# Patient Record
Sex: MALE | Race: WHITE | Employment: OTHER | URBAN - METROPOLITAN AREA
[De-identification: names, ages, dates, MRNs, and addresses within clinical notes are randomized per-mention and may not be internally consistent; named-entity substitution may affect disease eponyms.]

---

## 2017-01-03 ENCOUNTER — ED HISTORICAL/CONVERTED ENCOUNTER (OUTPATIENT)
Dept: OTHER | Age: 82
End: 2017-01-03

## 2017-05-07 ENCOUNTER — OP HISTORICAL/CONVERTED ENCOUNTER (OUTPATIENT)
Dept: OTHER | Age: 82
End: 2017-05-07

## 2017-07-19 ENCOUNTER — OP HISTORICAL/CONVERTED ENCOUNTER (OUTPATIENT)
Dept: OTHER | Age: 82
End: 2017-07-19

## 2017-07-20 ENCOUNTER — OP HISTORICAL/CONVERTED ENCOUNTER (OUTPATIENT)
Dept: OTHER | Age: 82
End: 2017-07-20

## 2017-09-12 ENCOUNTER — OP HISTORICAL/CONVERTED ENCOUNTER (OUTPATIENT)
Dept: OTHER | Age: 82
End: 2017-09-12

## 2017-09-18 ENCOUNTER — OP HISTORICAL/CONVERTED ENCOUNTER (OUTPATIENT)
Dept: OTHER | Age: 82
End: 2017-09-18

## 2017-09-25 ENCOUNTER — OP HISTORICAL/CONVERTED ENCOUNTER (OUTPATIENT)
Dept: OTHER | Age: 82
End: 2017-09-25

## 2017-10-13 ENCOUNTER — OP HISTORICAL/CONVERTED ENCOUNTER (OUTPATIENT)
Dept: OTHER | Age: 82
End: 2017-10-13

## 2018-03-07 ENCOUNTER — ED HISTORICAL/CONVERTED ENCOUNTER (OUTPATIENT)
Dept: OTHER | Age: 83
End: 2018-03-07

## 2018-07-05 ENCOUNTER — OP HISTORICAL/CONVERTED ENCOUNTER (OUTPATIENT)
Dept: OTHER | Age: 83
End: 2018-07-05

## 2018-10-09 ENCOUNTER — OP HISTORICAL/CONVERTED ENCOUNTER (OUTPATIENT)
Dept: OTHER | Age: 83
End: 2018-10-09

## 2018-11-26 ENCOUNTER — OP HISTORICAL/CONVERTED ENCOUNTER (OUTPATIENT)
Dept: OTHER | Age: 83
End: 2018-11-26

## 2019-08-28 ENCOUNTER — OP HISTORICAL/CONVERTED ENCOUNTER (OUTPATIENT)
Dept: OTHER | Age: 84
End: 2019-08-28

## 2021-02-03 ENCOUNTER — APPOINTMENT (OUTPATIENT)
Dept: CT IMAGING | Age: 86
DRG: 556 | End: 2021-02-03
Attending: PHYSICIAN ASSISTANT
Payer: MEDICARE

## 2021-02-03 ENCOUNTER — APPOINTMENT (OUTPATIENT)
Dept: GENERAL RADIOLOGY | Age: 86
DRG: 556 | End: 2021-02-03
Attending: EMERGENCY MEDICINE
Payer: MEDICARE

## 2021-02-03 ENCOUNTER — HOSPITAL ENCOUNTER (INPATIENT)
Age: 86
LOS: 4 days | Discharge: NURSING, REHAB OR LONG-TERM CARE | DRG: 556 | End: 2021-02-08
Attending: INTERNAL MEDICINE | Admitting: INTERNAL MEDICINE
Payer: MEDICARE

## 2021-02-03 DIAGNOSIS — R26.9 GAIT DISTURBANCE: Primary | ICD-10-CM

## 2021-02-03 DIAGNOSIS — M25.551 HIP JOINT PAINFUL ON MOVEMENT, RIGHT: ICD-10-CM
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
## 2021-02-03 DIAGNOSIS — F03.90 DEMENTIA WITHOUT BEHAVIORAL DISTURBANCE, UNSPECIFIED DEMENTIA TYPE: ICD-10-CM
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
## 2021-02-03 LAB
ALBUMIN SERPL-MCNC: 3 G/DL (ref 3.5–5)
ALBUMIN/GLOB SERPL: 0.7 {RATIO} (ref 1.1–2.2)
ALP SERPL-CCNC: 105 U/L (ref 45–117)
ALT SERPL-CCNC: 19 U/L (ref 12–78)
ANION GAP SERPL CALC-SCNC: 7 MMOL/L (ref 5–15)
APPEARANCE UR: CLEAR
AST SERPL W P-5'-P-CCNC: 28 U/L (ref 15–37)
BACTERIA URNS QL MICRO: NEGATIVE /HPF
BASOPHILS # BLD: 0 K/UL (ref 0–0.1)
BASOPHILS NFR BLD: 0 % (ref 0–1)
BILIRUB SERPL-MCNC: 0.7 MG/DL (ref 0.2–1)
BILIRUB UR QL: NEGATIVE
BUN SERPL-MCNC: 21 MG/DL (ref 6–20)
BUN/CREAT SERPL: 17 (ref 12–20)
CA-I BLD-MCNC: 8.8 MG/DL (ref 8.5–10.1)
CHLORIDE SERPL-SCNC: 104 MMOL/L (ref 97–108)
CO2 SERPL-SCNC: 25 MMOL/L (ref 21–32)
COLOR UR: ABNORMAL
CREAT SERPL-MCNC: 1.24 MG/DL (ref 0.7–1.3)
DIFFERENTIAL METHOD BLD: ABNORMAL
EOSINOPHIL # BLD: 0 K/UL (ref 0–0.4)
EOSINOPHIL NFR BLD: 0 % (ref 0–7)
ERYTHROCYTE [DISTWIDTH] IN BLOOD BY AUTOMATED COUNT: 14.9 % (ref 11.5–14.5)
GLOBULIN SER CALC-MCNC: 4.5 G/DL (ref 2–4)
GLUCOSE BLD STRIP.AUTO-MCNC: 159 MG/DL (ref 65–100)
GLUCOSE SERPL-MCNC: 119 MG/DL (ref 65–100)
GLUCOSE UR STRIP.AUTO-MCNC: NEGATIVE MG/DL
HCT VFR BLD AUTO: 29.7 % (ref 36.6–50.3)
HGB BLD-MCNC: 9.8 G/DL (ref 12.1–17)
HGB UR QL STRIP: ABNORMAL
IMM GRANULOCYTES # BLD AUTO: 0 K/UL (ref 0–0.04)
IMM GRANULOCYTES NFR BLD AUTO: 0 % (ref 0–0.5)
KETONES UR QL STRIP.AUTO: NEGATIVE MG/DL
LEUKOCYTE ESTERASE UR QL STRIP.AUTO: NEGATIVE
LYMPHOCYTES # BLD: 1.1 K/UL (ref 0.8–3.5)
LYMPHOCYTES NFR BLD: 20 % (ref 12–49)
MCH RBC QN AUTO: 42.8 PG (ref 26–34)
MCHC RBC AUTO-ENTMCNC: 33 G/DL (ref 30–36.5)
MCV RBC AUTO: 129.7 FL (ref 80–99)
MONOCYTES # BLD: 0.4 K/UL (ref 0–1)
MONOCYTES NFR BLD: 8 % (ref 5–13)
MUCOUS THREADS URNS QL MICRO: ABNORMAL /LPF
NEUTS SEG # BLD: 4.1 K/UL (ref 1.8–8)
NEUTS SEG NFR BLD: 72 % (ref 32–75)
NITRITE UR QL STRIP.AUTO: NEGATIVE
PERFORMED BY, TECHID: ABNORMAL
PH UR STRIP: 5 [PH] (ref 5–8)
PLATELET # BLD AUTO: 180 K/UL (ref 150–400)
PMV BLD AUTO: 10.2 FL (ref 8.9–12.9)
POTASSIUM SERPL-SCNC: 4.6 MMOL/L (ref 3.5–5.1)
PROT SERPL-MCNC: 7.5 G/DL (ref 6.4–8.2)
PROT UR STRIP-MCNC: NEGATIVE MG/DL
RBC # BLD AUTO: 2.29 M/UL (ref 4.1–5.7)
RBC #/AREA URNS HPF: ABNORMAL /HPF (ref 0–5)
SODIUM SERPL-SCNC: 136 MMOL/L (ref 136–145)
SP GR UR REFRACTOMETRY: 1.02 (ref 1–1.03)
TROPONIN I SERPL-MCNC: <0.05 NG/ML
UA: UC IF INDICATED,UAUC: ABNORMAL
UROBILINOGEN UR QL STRIP.AUTO: 0.1 EU/DL (ref 0.1–1)
WBC # BLD AUTO: 5.7 K/UL (ref 4.1–11.1)
WBC URNS QL MICRO: ABNORMAL /HPF (ref 0–4)

## 2021-02-03 PROCEDURE — 93005 ELECTROCARDIOGRAM TRACING: CPT

## 2021-02-03 PROCEDURE — 99218 HC RM OBSERVATION: CPT

## 2021-02-03 PROCEDURE — 71045 X-RAY EXAM CHEST 1 VIEW: CPT

## 2021-02-03 PROCEDURE — 72192 CT PELVIS W/O DYE: CPT

## 2021-02-03 PROCEDURE — 99285 EMERGENCY DEPT VISIT HI MDM: CPT

## 2021-02-03 PROCEDURE — 36415 COLL VENOUS BLD VENIPUNCTURE: CPT

## 2021-02-03 PROCEDURE — 96374 THER/PROPH/DIAG INJ IV PUSH: CPT

## 2021-02-03 PROCEDURE — 81001 URINALYSIS AUTO W/SCOPE: CPT

## 2021-02-03 PROCEDURE — 72131 CT LUMBAR SPINE W/O DYE: CPT

## 2021-02-03 PROCEDURE — 80053 COMPREHEN METABOLIC PANEL: CPT

## 2021-02-03 PROCEDURE — 73502 X-RAY EXAM HIP UNI 2-3 VIEWS: CPT

## 2021-02-03 PROCEDURE — 74011250637 HC RX REV CODE- 250/637: Performed by: PHYSICIAN ASSISTANT

## 2021-02-03 PROCEDURE — 82962 GLUCOSE BLOOD TEST: CPT

## 2021-02-03 PROCEDURE — 70450 CT HEAD/BRAIN W/O DYE: CPT

## 2021-02-03 PROCEDURE — 85025 COMPLETE CBC W/AUTO DIFF WBC: CPT

## 2021-02-03 PROCEDURE — 84484 ASSAY OF TROPONIN QUANT: CPT

## 2021-02-03 PROCEDURE — 74011250636 HC RX REV CODE- 250/636: Performed by: PHYSICIAN ASSISTANT

## 2021-02-03 RX ORDER — LEVOTHYROXINE SODIUM 50 UG/1
50 TABLET ORAL
COMMUNITY
End: 2021-03-03

## 2021-02-03 RX ORDER — METFORMIN HYDROCHLORIDE 500 MG/1
500 TABLET ORAL 2 TIMES DAILY
COMMUNITY
End: 2021-03-03

## 2021-02-03 RX ORDER — TAMSULOSIN HYDROCHLORIDE 0.4 MG/1
0.4 CAPSULE ORAL DAILY
COMMUNITY
End: 2021-03-03

## 2021-02-03 RX ORDER — LEVOTHYROXINE SODIUM 25 UG/1
50 TABLET ORAL
Status: DISCONTINUED | OUTPATIENT
Start: 2021-02-04 | End: 2021-02-08 | Stop reason: HOSPADM

## 2021-02-03 RX ORDER — TOLTERODINE 4 MG/1
4 CAPSULE, EXTENDED RELEASE ORAL DAILY
COMMUNITY
End: 2021-03-03

## 2021-02-03 RX ORDER — MAGNESIUM SULFATE 100 %
4 CRYSTALS MISCELLANEOUS AS NEEDED
Status: DISCONTINUED | OUTPATIENT
Start: 2021-02-03 | End: 2021-02-08 | Stop reason: HOSPADM

## 2021-02-03 RX ORDER — TAMSULOSIN HYDROCHLORIDE 0.4 MG/1
0.4 CAPSULE ORAL DAILY
Status: DISCONTINUED | OUTPATIENT
Start: 2021-02-04 | End: 2021-02-08 | Stop reason: HOSPADM

## 2021-02-03 RX ORDER — OXYCODONE HYDROCHLORIDE 5 MG/1
5 TABLET ORAL
Status: DISCONTINUED | OUTPATIENT
Start: 2021-02-03 | End: 2021-02-08 | Stop reason: HOSPADM

## 2021-02-03 RX ORDER — DILTIAZEM HYDROCHLORIDE 240 MG/1
240 CAPSULE, EXTENDED RELEASE ORAL DAILY
Status: DISCONTINUED | OUTPATIENT
Start: 2021-02-04 | End: 2021-02-04

## 2021-02-03 RX ORDER — MORPHINE SULFATE 2 MG/ML
2 INJECTION, SOLUTION INTRAMUSCULAR; INTRAVENOUS ONCE
Status: COMPLETED | OUTPATIENT
Start: 2021-02-03 | End: 2021-02-03

## 2021-02-03 RX ORDER — DEXTROSE 50 % IN WATER (D50W) INTRAVENOUS SYRINGE
25-50 AS NEEDED
Status: DISCONTINUED | OUTPATIENT
Start: 2021-02-03 | End: 2021-02-08 | Stop reason: HOSPADM

## 2021-02-03 RX ORDER — TRAMADOL HYDROCHLORIDE 50 MG/1
50 TABLET ORAL
Status: DISCONTINUED | OUTPATIENT
Start: 2021-02-03 | End: 2021-02-08 | Stop reason: HOSPADM

## 2021-02-03 RX ORDER — ACETAMINOPHEN 650 MG/1
650 SUPPOSITORY RECTAL
Status: DISCONTINUED | OUTPATIENT
Start: 2021-02-03 | End: 2021-02-08 | Stop reason: HOSPADM

## 2021-02-03 RX ORDER — HYDROXYUREA 500 MG/1
500 CAPSULE ORAL 2 TIMES DAILY
COMMUNITY
End: 2021-03-03

## 2021-02-03 RX ORDER — ONDANSETRON 2 MG/ML
4 INJECTION INTRAMUSCULAR; INTRAVENOUS
Status: DISCONTINUED | OUTPATIENT
Start: 2021-02-03 | End: 2021-02-08 | Stop reason: HOSPADM

## 2021-02-03 RX ORDER — POLYETHYLENE GLYCOL 3350 17 G/17G
17 POWDER, FOR SOLUTION ORAL DAILY PRN
Status: DISCONTINUED | OUTPATIENT
Start: 2021-02-03 | End: 2021-02-08 | Stop reason: HOSPADM

## 2021-02-03 RX ORDER — PROMETHAZINE HYDROCHLORIDE 25 MG/1
12.5 TABLET ORAL
Status: DISCONTINUED | OUTPATIENT
Start: 2021-02-03 | End: 2021-02-08 | Stop reason: HOSPADM

## 2021-02-03 RX ORDER — SODIUM CHLORIDE 0.9 % (FLUSH) 0.9 %
5-40 SYRINGE (ML) INJECTION EVERY 8 HOURS
Status: DISCONTINUED | OUTPATIENT
Start: 2021-02-03 | End: 2021-02-06

## 2021-02-03 RX ORDER — HYDROXYUREA 500 MG/1
500 CAPSULE ORAL 2 TIMES DAILY
Status: DISCONTINUED | OUTPATIENT
Start: 2021-02-03 | End: 2021-02-08 | Stop reason: HOSPADM

## 2021-02-03 RX ORDER — DABIGATRAN ETEXILATE 75 MG/1
75 CAPSULE ORAL EVERY 12 HOURS
COMMUNITY
End: 2021-03-03

## 2021-02-03 RX ORDER — GLYBURIDE 5 MG/1
5 TABLET ORAL
COMMUNITY
End: 2021-03-03

## 2021-02-03 RX ORDER — SODIUM CHLORIDE 0.9 % (FLUSH) 0.9 %
5-40 SYRINGE (ML) INJECTION AS NEEDED
Status: DISCONTINUED | OUTPATIENT
Start: 2021-02-03 | End: 2021-02-08 | Stop reason: HOSPADM

## 2021-02-03 RX ORDER — INSULIN LISPRO 100 [IU]/ML
INJECTION, SOLUTION INTRAVENOUS; SUBCUTANEOUS
Status: DISCONTINUED | OUTPATIENT
Start: 2021-02-03 | End: 2021-02-08 | Stop reason: HOSPADM

## 2021-02-03 RX ORDER — DILTIAZEM HYDROCHLORIDE 240 MG/1
240 CAPSULE, EXTENDED RELEASE ORAL DAILY
COMMUNITY
End: 2021-03-03

## 2021-02-03 RX ORDER — ACETAMINOPHEN 325 MG/1
650 TABLET ORAL
Status: DISCONTINUED | OUTPATIENT
Start: 2021-02-03 | End: 2021-02-08 | Stop reason: HOSPADM

## 2021-02-03 RX ORDER — OXYBUTYNIN CHLORIDE 5 MG/1
5 TABLET ORAL 3 TIMES DAILY
Status: DISCONTINUED | OUTPATIENT
Start: 2021-02-03 | End: 2021-02-08 | Stop reason: HOSPADM

## 2021-02-03 RX ADMIN — MORPHINE SULFATE 2 MG: 2 INJECTION, SOLUTION INTRAMUSCULAR; INTRAVENOUS at 14:09

## 2021-02-03 RX ADMIN — OXYCODONE 5 MG: 5 TABLET ORAL at 23:54

## 2021-02-03 RX ADMIN — Medication 10 ML: at 23:54

## 2021-02-03 NOTE — ED PROVIDER NOTES
EMERGENCY DEPARTMENT HISTORY AND PHYSICAL EXAM      Date: 2/3/2021  Patient Name: Louise Parker    History of Presenting Illness     Chief Complaint   Patient presents with    Extremity Weakness       History Provided By: Patient, EMS and Caregiver    HPI: Louise Parker, 80 y.o. male with a past medical history significant dementia, DM, HTN, TIA, BPH presents to the ED with cc of right hip pain status post fall 2 nights ago. Patient ports he is unable to bear any weight on his right leg or bend the right knee secondary to the pain. He reports ambulating with a walker at home however 2 nights ago he lost his balance and fell backwards directly onto his buttocks. Patient reports laying in the bed most of the last 2 days. Wife at home to help care for him. He specifically denies previous back surgery, numbness, tingling, chest pain, shortness of breath, dizziness, head injury, headache, loss of consciousness, focal weakness, slurred speech, blurred vision, fever, bowel incontinence, urinary retention. There are no other complaints, changes, or physical findings at this time.     PCP: Tracy Mae MD        Past History     Past Medical History:  Past Medical History:   Diagnosis Date    [facility]     Diabetes mellitus type 2, uncomplicated ([facility] [facility]     Hyperlipidemia     Hypertension     Hypothyroidism     Polycythemia vera ([facility] [address] [facility]     Prostatic hyperplasia     Spinal stenosis     Transient ischemic attack        Past Surgical History:  Past Surgical History:   Procedure Laterality Date    HX OTHER SURGICAL      None       Family History:  Family History   Problem Relation Age of Onset    Hypertension Mother     Stroke Mother     Lung Cancer Father     Melanoma Father        Social History:  Social History     Tobacco Use    Smoking status: Not on file   Substance Use Topics    Alcohol use: Never     Frequency: Never     Binge frequency: Never    Drug use: Never Allergies:  No Known Allergies      Review of Systems   Review of Systems   Constitutional: Negative for activity change, chills and fever. HENT: Negative for congestion, ear pain, rhinorrhea and trouble swallowing. Eyes: Negative for pain and visual disturbance. Respiratory: Negative for cough and shortness of breath. Cardiovascular: Negative for chest pain. Gastrointestinal: Negative for abdominal pain, diarrhea, nausea and vomiting. Genitourinary: Negative for decreased urine volume, difficulty urinating, dysuria and hematuria. Musculoskeletal: Positive for arthralgias, gait problem and myalgias. Negative for joint swelling. Skin: Negative for rash. Neurological: Negative for dizziness, speech difficulty, weakness, numbness and headaches. Hematological: Negative for adenopathy. Psychiatric/Behavioral: The patient is not nervous/anxious. All other systems reviewed and are negative. Physical Exam   Physical Exam  Vitals signs and nursing note reviewed. Constitutional:       General: He is not in acute distress. Appearance: He is well-developed. Comments: Laying on stretcher with left knee bent, right leg straight   HENT:      Head: Normocephalic and atraumatic. Mouth/Throat:      Mouth: Mucous membranes are moist.   Eyes:      Extraocular Movements: Extraocular movements intact. Pupils: Pupils are equal, round, and reactive to light. Cardiovascular:      Rate and Rhythm: Normal rate and regular rhythm. Heart sounds: Normal heart sounds. Pulmonary:      Effort: Pulmonary effort is normal. No respiratory distress. Breath sounds: Normal breath sounds. Abdominal:      General: Abdomen is flat. Bowel sounds are normal.      Palpations: Abdomen is soft. Tenderness: There is no abdominal tenderness. Musculoskeletal:      Right hip: He exhibits decreased range of motion, decreased strength, tenderness and bony tenderness.  He exhibits no deformity. Lumbar back: He exhibits no bony tenderness, no edema and no deformity. Comments: No leg length discrepancy, distal pulses intact, dorsi/plantar flexion intact, patient reports pain with right hip flexion, ER, and right knee flexion. No obvious ecchymosis    Skin:     General: Skin is warm and dry. Capillary Refill: Capillary refill takes less than 2 seconds. Findings: No rash. Neurological:      General: No focal deficit present. Mental Status: He is alert and oriented to person, place, and time. Mental status is at baseline. Cranial Nerves: No cranial nerve deficit. Sensory: Sensation is intact. Psychiatric:         Mood and Affect: Mood normal.         Behavior: Behavior normal.         Diagnostic Study Results     Labs -     Recent Results (from the past 48 hour(s))   CBC WITH AUTOMATED DIFF    Collection Time: 02/03/21 10:50 AM   Result Value Ref Range    WBC 5.7 4.1 - 11.1 K/uL    RBC 2.29 (L) 4.10 - 5.70 M/uL    HGB 9.8 (L) 12.1 - 17.0 g/dL    HCT 29.7 (L) 36.6 - 50.3 %    .7 (H) 80.0 - 99.0 FL    MCH 42.8 (H) 26.0 - 34.0 PG    MCHC 33.0 30.0 - 36.5 g/dL    RDW 14.9 (H) 11.5 - 14.5 %    PLATELET 478 003 - 482 K/uL    MPV 10.2 8.9 - 12.9 FL    NEUTROPHILS 72 32 - 75 %    LYMPHOCYTES 20 12 - 49 %    MONOCYTES 8 5 - 13 %    EOSINOPHILS 0 0 - 7 %    BASOPHILS 0 0 - 1 %    IMMATURE GRANULOCYTES 0 0.0 - 0.5 %    ABS. NEUTROPHILS 4.1 1.8 - 8.0 K/UL    ABS. LYMPHOCYTES 1.1 0.8 - 3.5 K/UL    ABS. MONOCYTES 0.4 0.0 - 1.0 K/UL    ABS. EOSINOPHILS 0.0 0.0 - 0.4 K/UL    ABS. BASOPHILS 0.0 0.0 - 0.1 K/UL    ABS. IMM.  GRANS. 0.0 0.00 - 0.04 K/UL    DF AUTOMATED     METABOLIC PANEL, COMPREHENSIVE    Collection Time: 02/03/21 10:50 AM   Result Value Ref Range    Sodium 136 136 - 145 mmol/L    Potassium 4.6 3.5 - 5.1 mmol/L    Chloride 104 97 - 108 mmol/L    CO2 25 21 - 32 mmol/L    Anion gap 7 5 - 15 mmol/L    Glucose 119 (H) 65 - 100 mg/dL    BUN 21 (H) 6 - 20 mg/dL Creatinine 1.24 0.70 - 1.30 mg/dL    BUN/Creatinine ratio 17 12 - 20      GFR est AA >60 >60 ml/min/1.73m2    GFR est non-AA 55 (L) >60 ml/min/1.73m2    Calcium 8.8 8.5 - 10.1 mg/dL    Bilirubin, total 0.7 0.2 - 1.0 mg/dL    AST (SGOT) 28 15 - 37 U/L    ALT (SGPT) 19 12 - 78 U/L    Alk.  phosphatase 105 45 - 117 U/L    Protein, total 7.5 6.4 - 8.2 g/dL    Albumin 3.0 (L) 3.5 - 5.0 g/dL    Globulin 4.5 (H) 2.0 - 4.0 g/dL    A-G Ratio 0.7 (L) 1.1 - 2.2     TROPONIN I    Collection Time: 02/03/21 10:50 AM   Result Value Ref Range    Troponin-I, Qt. <0.05 <0.05 ng/mL   URINALYSIS W/ REFLEX CULTURE    Collection Time: 02/03/21 10:50 AM    Specimen: Urine   Result Value Ref Range    Color Yellow/Straw      Appearance Clear Clear      Specific gravity 1.016 1.003 - 1.030      pH (UA) 5.0 5.0 - 8.0      Protein Negative Negative mg/dL    Glucose Negative Negative mg/dL    Ketone Negative Negative mg/dL    Bilirubin Negative Negative      Blood Small (A) Negative      Urobilinogen 0.1 0.1 - 1.0 EU/dL    Nitrites Negative Negative      Leukocyte Esterase Negative Negative      UA:UC IF INDICATED Culture not indicated by UA result Culture not indicated by UA result      Mucus Trace /lpf    WBC 0-4 0 - 4 /hpf    RBC 0-5 0 - 5 /hpf    Bacteria Negative Negative /hpf   GLUCOSE, POC    Collection Time: 02/03/21 11:46 PM   Result Value Ref Range    Glucose (POC) 159 (H) 65 - 100 mg/dL    Performed by Vidhi Coto    GLUCOSE, POC    Collection Time: 02/04/21  8:47 AM   Result Value Ref Range    Glucose (POC) 118 (H) 65 - 100 mg/dL    Performed by [place]    CBC WITH AUTOMATED DIFF    Collection Time: 02/04/21  9:10 AM   Result Value Ref Range    WBC 6.3 4.1 - 11.1 K/uL    RBC 2.99 (L) 4.10 - 5.70 M/uL    HGB 13.2 12.1 - 17.0 g/dL    HCT 37.9 36.6 - 50.3 %    .8 (H) 80.0 - 99.0 FL    MCH 44.1 (H) 26.0 - 34.0 PG    MCHC 34.8 30.0 - 36.5 g/dL    RDW 14.6 (H) 11.5 - 14.5 %    PLATELET 664 322 - 757 K/uL    MPV 10.3 8.9 - 12.9 FL    NEUTROPHILS 76 (H) 32 - 75 %    LYMPHOCYTES 16 12 - 49 %    MONOCYTES 8 5 - 13 %    EOSINOPHILS 0 0 - 7 %    BASOPHILS 0 0 - 1 %    IMMATURE GRANULOCYTES 0 0.0 - 0.5 %    ABS. NEUTROPHILS 4.8 1.8 - 8.0 K/UL    ABS. LYMPHOCYTES 1.0 0.8 - 3.5 K/UL    ABS. MONOCYTES 0.5 0.0 - 1.0 K/UL    ABS. EOSINOPHILS 0.0 0.0 - 0.4 K/UL    ABS. BASOPHILS 0.0 0.0 - 0.1 K/UL    ABS. IMM. GRANS. 0.0 0.00 - 0.04 K/UL    DF AUTOMATED     METABOLIC PANEL, COMPREHENSIVE    Collection Time: 02/04/21  9:10 AM   Result Value Ref Range    Sodium 137 136 - 145 mmol/L    Potassium 3.9 3.5 - 5.1 mmol/L    Chloride 104 97 - 108 mmol/L    CO2 28 21 - 32 mmol/L    Anion gap 5 5 - 15 mmol/L    Glucose 122 (H) 65 - 100 mg/dL    BUN 18 6 - 20 mg/dL    Creatinine 1.05 0.70 - 1.30 mg/dL    BUN/Creatinine ratio 17 12 - 20      GFR est AA >60 >60 ml/min/1.73m2    GFR est non-AA >60 >60 ml/min/1.73m2    Calcium 9.0 8.5 - 10.1 mg/dL    Bilirubin, total 0.7 0.2 - 1.0 mg/dL    AST (SGOT) 6 (L) 15 - 37 U/L    ALT (SGPT) 14 12 - 78 U/L    Alk. phosphatase 97 45 - 117 U/L    Protein, total 6.9 6.4 - 8.2 g/dL    Albumin 2.9 (L) 3.5 - 5.0 g/dL    Globulin 4.0 2.0 - 4.0 g/dL    A-G Ratio 0.7 (L) 1.1 - 2.2     GLUCOSE, POC    Collection Time: 02/04/21 11:40 AM   Result Value Ref Range    Glucose (POC) 125 (H) 65 - 100 mg/dL    Performed by Evonne Kanner        Radiologic Studies -   XR Results (most recent):  Results from Hospital Encounter encounter on 02/03/21   XR HIP RT W OR WO PELV 2-3 VWS    Narrative Right hip, 2 views    No plain film evidence for fracture or dislocation. Moderate grade DJD each hip,  evident as joint space narrowing and articular sclerosis. CT Results  (Last 48 hours)               02/03/21 1519  CT PELV WO CONT Final result    Impression:  No pelvic fracture. No fracture or dislocation either hip. Abdominal aorta and pelvic arteries atherosclerosis. Prostate enlargement.        Narrative:  CT pelvis       Axial images are reviewed along with reformatted sagittal/coronal/3-D MIP   images. No IV contrast administered. Dose reduction: All CT scans at this facility are performed using dose reduction   optimization techniques as appropriate to a performed exam including the   following-   automated exposure control, adjustments of mA and/or Kv according to patient   size, or use of iterative reconstructive technique. Normal alignment imaged lower lumbar spine. Sacrum intact. Degenerative change   SI joints, left more so than right. No sacral fracture. No fracture or dislocation left hip. No fracture or dislocation right hip. Pelvis intact; no pelvic fracture. No pubic symphysis diastases. Soft tissue axial images reveal atherosclerotic change abdominal aorta and   pelvic arteries. Prostate enlargement. No ascites. No soft tissue hematoma   evident. 02/03/21 1139  CT SPINE LUMB WO CONT Final result    Impression:  No CT evidence for lumbar vertebral body compression deformity,   retropulsed bone, or malalignment. Noted advanced abdominal aorta atherosclerosis. With any persistent clinical concern for radicular-type signs or symptoms, MRI   of the lumbar spine may be warranted. Narrative:  CT lumbar spine. Axial images are reviewed along with reformatted sagittal/coronal/3-D MIP   images. Dose reduction: All CT scans at this facility are performed using dose reduction   optimization techniques as appropriate to a performed exam including the   following-   automated exposure control, adjustments of mA and/or Kv according to patient   size, or use of iterative reconstructive technique. There is normal alignment of lumbar vertebral column. No lumbar vertebral body   compression deformity or retropulsed bone. Facets are normally aligned. Facet   hypertrophy more so through mid and lower lumbar levels. Soft tissue sagittal   images demonstrate no monique spinal stenosis. Coronal images reveal transverse processes are intact. SI joints intact. Axial images demonstrate no fracture. Soft tissue axial images reveal symmetric appearance to the paraspinal   musculature. Advanced atherosclerotic change normal caliber abdominal aorta. 02/03/21 1139  CT HEAD WO CONT Final result    Impression:      1. No acute intracranial abnormality       2. Stable underlying white matter changes, compatible with moderate to severe   chronic microvascular disease. 3. Ventricles appear slightly prominent for the degree of cerebral volume loss,   which may represent central volume loss. Normal pressure hydrocephalus may have   overlapping CT appearance and correlation with physical examination is   recommended. Narrative:      Technique: axial noncontrast images were obtained from the skull base through   the vertex. All CT scans at this facility are performed using dose reduction optimization   techniques as appropriate to a performed exam including the following: Automated   exposure control, adjustments to the MA and/or KV according to patient size or   use of iterative reconstruction technique       Comparison: October 9, 2018       Findings: There is global cerebral and cerebellar volume loss. Dilatation of lateral and   third ventricles which appear slightly prominent for the degree of cerebral   volume loss, with relative crowding of sulci at the vertex. No hemorrhage, midline shift, mass effect or herniation. Patchy foci of   decreased attenuation are seen in the supratentorial white matter. There is   atherosclerotic disease. Cortical grey/white differentiation is preserved. The paranasal sinuses and mastoid air cells are clear. The osseous structures   are intact. The tissues right pseudophakic; orbits are otherwise unremarkable. Medical Decision Making and ED Course   I am the first provider for this patient.     I reviewed the vital signs, available nursing notes, past medical history, past surgical history, family history and social history. Vital Signs-Reviewed the patient's vital signs. Patient Vitals for the past 12 hrs:   Pulse Resp BP SpO2   02/04/21 1331 94 16 135/80 97 %   02/04/21 1130 81 18 120/79 97 %   02/04/21 0852 96 16 119/71 97 %   02/04/21 0721 75 28 135/88 95 %   02/04/21 0634 77 16 (!) 144/95 93 %       Records Reviewed: Nursing Notes    Provider Notes (Medical Decision Making):       MDM  Number of Diagnoses or Management Options  Dementia without behavioral disturbance, unspecified dementia type ([facility] [address]  Fall from ground level  Gait disturbance  Hip joint painful on movement, right  Diagnosis management comments: This is an 75-year-old male with a ground-level fall, landing on his coccyx. Unable to move right lower extremity secondary to pain, unable to walk. CT lumbar spine, pelvis, and right hip without any obvious fracture or dislocation. Concern for radiculopathy versus hematoma. No red flag symptoms. Will admit for routine MRI and therapy. Amount and/or Complexity of Data Reviewed  Clinical lab tests: ordered and reviewed  Tests in the radiology section of CPT®: ordered and reviewed          ED Course:   Initial assessment performed. The patients presenting problems have been discussed, and they are in agreement with the care plan formulated and outlined with them. I have encouraged them to ask questions as they arise throughout their visit. 2:59 PM  Progress Note:  Patient was re-evaluated at bedside. After pain medication, patient still unable to move the right lower extremity secondary to pain. Consult Note:  2:59 PM  Heaven Juan PA-C spoke with Dr. Brianda Rico  Specialty: ED Attending  Discussed pt's hx, disposition, and available diagnostic and imaging results. Reviewed care plans. Advises CT of the right hip and pelvis to be without acute fracture.     Consult Note:  4:31 PM  Viraj High PA-C spoke with Dr. Phil Jose  Specialty: Internal Medicine  Discussed pt's hx, disposition, and available diagnostic and imaging results. Reviewed care plans. Will evaluate the patient for admission. Admit Note:  4:31 PM  Pt is being admitted by Dr. Phil Jose. The results of their tests and reason(s) for their admission have been discussed with pt and/or available family. They convey agreement and understanding for the need to be admitted and for admission diagnosis. Procedures       Viraj High PA-C    Procedures   Viraj High PA-C        Disposition       Admitted      Diagnosis     Clinical Impression:   1. Gait disturbance    2. Hip joint painful on movement, right    3. Fall from ground level    4. Dementia without behavioral disturbance, unspecified dementia type ([facility] [address]        Attestations:    Viraj High PA-C    Please note that this dictation was completed with [employer], the computer voice recognition software. Quite often unanticipated grammatical, syntax, homophones, and other interpretive errors are inadvertently transcribed by the computer software. Please disregard these errors. Please excuse any errors that have escaped final proofreading. Thank you.

## 2021-02-03 NOTE — ED TRIAGE NOTES
[facility] ems was called for mainly BLE weakness for a few days which has been affecting his mobility;EMS was called because pt was unable to move out of his recliner that he slept in last; pt fell yesterday and EMS was called to pick him up;  Pt has a Hx of new early onset dementia

## 2021-02-03 NOTE — Clinical Note
Patient Class[de-identified] OBSERVATION [001]   Type of Bed: Medical [8]   Reason for Observation: Right hip pain   Admitting Diagnosis: Fall [[id number]]   Admitting Physician: Vivian Liriano [address] [place] [address]   Attending Physician: Rosita Llanos

## 2021-02-03 NOTE — H&P
History and Physical    Patient: Ralph Carrasquillo MRN: [id number]  SSN: [id number]    YOB: 1934  Age: 80 y.o. Sex: male      Subjective:      Ralph Carrasquillo is a 80 y.o. male with history of dementia, type 2 diabetes, hypertension, spinal stenosis, hyperlipidemia, and atrial fibrillation who presented to the ED with right hip pain after a fall 2 days prior. He reports he tried to stand up out of a chair when he fell backwards mostly onto his right side/buttocks. He was able to ambulate with pain 2 days ago and it has progressively become worse. He is unable to ambulate secondary to pain. Head CT shows stable white matter changes with chronic microvascular disease. Pelvic CT negative for acute fracture or dislocation. 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 Lumbar spine CT shows abdominal aorta atherosclerosis, no evidence of vertebral fracture. MRI pending. Ortho consulted. Will need discharge planning. Medical history given by wife, Cellectis. She reports that at baseline he shuffles when walking and has been experiencing progressively weaker lower extremities. This is his second fall in the past week. Past Medical History:   Diagnosis Date    Atrial fibrillation ([facility]     Diabetes mellitus type 2, uncomplicated (HCC)     Hyperlipidemia     Hypertension     Hypothyroidism     Polycythemia vera ([facility]     Prostatic hyperplasia     Spinal stenosis     Transient ischemic attack      Past Surgical History:   Procedure Laterality Date    HX OTHER SURGICAL      None      Family History   Problem Relation Age of Onset    Hypertension Mother     Stroke Mother     Lung Cancer Father     Melanoma Father      Social History     Tobacco Use    Smoking status: Not on file   Substance Use Topics    Alcohol use: Never     Frequency: Never     Binge frequency: Never      Prior to Admission medications    Medication Sig Start Date End Date Taking?  Authorizing Provider   levothyroxine (SYNTHROID) 50 mcg tablet Take 50 mcg by mouth Daily (before breakfast). Yes Provider, Historical   glyBURIDE (DIABETA) 5 mg tablet Take 5 mg by mouth Daily (before breakfast). Yes Provider, Historical   metFORMIN 500 mg/5 mL soln Take 500 mg by mouth two (2) times a day. Yes Provider, Historical   SITagliptin (Januvia) 100 mg tablet Take 100 mg by mouth daily. Yes Provider, Historical   dilTIAZem ER (DILACOR XR) 240 mg capsule Take 240 mg by mouth daily. Yes Provider, Historical   dabigatran etexilate (Pradaxa) 75 mg capsule Take 75 mg by mouth every twelve (12) hours. Yes Provider, Historical   tamsulosin (FLOMAX) 0.4 mg capsule Take 0.4 mg by mouth daily. Yes Provider, Historical   tolterodine ER (DETROL LA) 4 mg ER capsule Take 4 mg by mouth daily. Yes Provider, Historical   hydroxyurea (HYDREA) 500 mg capsule Take 500 mg by mouth two (2) times a day. Yes Provider, Historical        No Known Allergies    Review of Systems:  Review of Systems   Constitutional: Negative. HENT: Positive for hearing loss. Eyes: Negative. Respiratory: Negative. Cardiovascular: Negative. Gastrointestinal: Negative. Genitourinary:        Hesitancy   Musculoskeletal: Positive for falls and joint pain (Right hip). Skin: Negative. Neurological: Positive for weakness. Negative for dizziness, tremors, focal weakness, loss of consciousness and headaches.         Objective:     Recent Results (from the past 24 hour(s))   CBC WITH AUTOMATED DIFF    Collection Time: 02/03/21 10:50 AM   Result Value Ref Range    WBC 5.7 4.1 - 11.1 K/uL    RBC 2.29 (L) 4.10 - 5.70 M/uL    HGB 9.8 (L) 12.1 - 17.0 g/dL    HCT 29.7 (L) 36.6 - 50.3 %    .7 (H) 80.0 - 99.0 FL    MCH 42.8 (H) 26.0 - 34.0 PG    MCHC 33.0 30.0 - 36.5 g/dL    RDW 14.9 (H) 11.5 - 14.5 %    PLATELET 635 994 - 314 K/uL    MPV 10.2 8.9 - 12.9 FL    NEUTROPHILS 72 32 - 75 %    LYMPHOCYTES 20 12 - 49 %    MONOCYTES 8 5 - 13 %    EOSINOPHILS 0 0 - 7 %    BASOPHILS 0 0 - 1 %    IMMATURE GRANULOCYTES 0 0.0 - 0.5 %    ABS. NEUTROPHILS 4.1 1.8 - 8.0 K/UL    ABS. LYMPHOCYTES 1.1 0.8 - 3.5 K/UL    ABS. MONOCYTES 0.4 0.0 - 1.0 K/UL    ABS. EOSINOPHILS 0.0 0.0 - 0.4 K/UL    ABS. BASOPHILS 0.0 0.0 - 0.1 K/UL    ABS. IMM. GRANS. 0.0 0.00 - 0.04 K/UL    DF AUTOMATED     METABOLIC PANEL, COMPREHENSIVE    Collection Time: 02/03/21 10:50 AM   Result Value Ref Range    Sodium 136 136 - 145 mmol/L    Potassium 4.6 3.5 - 5.1 mmol/L    Chloride 104 97 - 108 mmol/L    CO2 25 21 - 32 mmol/L    Anion gap 7 5 - 15 mmol/L    Glucose 119 (H) 65 - 100 mg/dL    BUN 21 (H) 6 - 20 mg/dL    Creatinine 1.24 0.70 - 1.30 mg/dL    BUN/Creatinine ratio 17 12 - 20      GFR est AA >60 >60 ml/min/1.73m2    GFR est non-AA 55 (L) >60 ml/min/1.73m2    Calcium 8.8 8.5 - 10.1 mg/dL    Bilirubin, total 0.7 0.2 - 1.0 mg/dL    AST (SGOT) 28 15 - 37 U/L    ALT (SGPT) 19 12 - 78 U/L    Alk.  phosphatase 105 45 - 117 U/L    Protein, total 7.5 6.4 - 8.2 g/dL    Albumin 3.0 (L) 3.5 - 5.0 g/dL    Globulin 4.5 (H) 2.0 - 4.0 g/dL    A-G Ratio 0.7 (L) 1.1 - 2.2     TROPONIN I    Collection Time: 02/03/21 10:50 AM   Result Value Ref Range    Troponin-I, Qt. <0.05 <0.05 ng/mL   URINALYSIS W/ REFLEX CULTURE    Collection Time: 02/03/21 10:50 AM    Specimen: Urine   Result Value Ref Range    Color Yellow/Straw      Appearance Clear Clear      Specific gravity 1.016 1.003 - 1.030      pH (UA) 5.0 5.0 - 8.0      Protein Negative Negative mg/dL    Glucose Negative Negative mg/dL    Ketone Negative Negative mg/dL    Bilirubin Negative Negative      Blood Small (A) Negative      Urobilinogen 0.1 0.1 - 1.0 EU/dL    Nitrites Negative Negative      Leukocyte Esterase Negative Negative      UA:UC IF INDICATED Culture not indicated by UA result Culture not indicated by UA result      Mucus Trace /lpf    WBC 0-4 0 - 4 /hpf    RBC 0-5 0 - 5 /hpf    Bacteria Negative Negative /hpf        CT PELV WO CONT   Final Result   No pelvic fracture. No fracture or dislocation either hip. Abdominal aorta and pelvic arteries atherosclerosis. Prostate enlargement. XR HIP RT W OR WO PELV 2-3 VWS   Final Result      XR CHEST SNGL V   Final Result      CT SPINE LUMB WO CONT   Final Result   No CT evidence for lumbar vertebral body compression deformity,   retropulsed bone, or malalignment. Noted advanced abdominal aorta atherosclerosis. With any persistent clinical concern for radicular-type signs or symptoms, MRI   of the lumbar spine may be warranted. CT HEAD WO CONT   Final Result      1. No acute intracranial abnormality      2. Stable underlying white matter changes, compatible with moderate to severe   chronic microvascular disease. 3. Ventricles appear slightly prominent for the degree of cerebral volume loss,   which may represent central volume loss. Normal pressure hydrocephalus may have   overlapping CT appearance and correlation with physical examination is   recommended. MRI HIP  RT  WO CONT    (Results Pending)   MRI PELV WO CONT    (Results Pending)   MRI LUMB SPINE WO CONT    (Results Pending)        Vitals:    02/03/21 1042   BP: 121/72   Pulse: 99   Resp: 16   Temp: 97.5 °F (36.4 °C)   SpO2: 99%   Weight: 210 lb (95.3 kg)   Height: 6' 1\" (1.854 m)        Physical Exam:  Physical Exam  Constitutional:       General: He is not in acute distress. Appearance: He is normal weight. HENT:      Head: Normocephalic and atraumatic. Right Ear: External ear normal.      Left Ear: External ear normal.      Nose: Nose normal.      Mouth/Throat:      Mouth: Mucous membranes are moist.      Pharynx: Oropharynx is clear. Eyes:      Pupils: Pupils are equal, round, and reactive to light. Cardiovascular:      Rate and Rhythm: Normal rate. Rhythm irregular. Heart sounds: Normal heart sounds. No murmur. Pulmonary:      Effort: Pulmonary effort is normal.      Breath sounds: Normal breath sounds.  No wheezing. Abdominal:      General: Abdomen is flat. Palpations: Abdomen is soft. Musculoskeletal:         General: Tenderness (over anterior right hip) present. No swelling or deformity. Right lower leg: Edema present. Skin:     General: Skin is warm and dry. Findings: Lesion (lower extremities have mild bruises and lesions) present. Neurological:      General: No focal deficit present. Mental Status: He is alert. Mental status is at baseline. Comments: Dementia, poor historian          Assessment/Plan:     Impression:   1. Right hip pain  2. Ground level fall  3. Type II diabetes  4. Hypertension  5. Atrial fibrillation  6. Hypothyroidism  7. History of TIA    1. 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 Orthopedic consult  MRI pending  Tramadol and oxycodone for pain    2. Ground level fall  UA negative for UTI  Troponin negative  Head CT shows stable white matter changes with chronic microvascular disease    3. Type II diabetes  SSI    4. Hypertension  Metoprolol  Blood pressure well-controlled    5. Atrial fibrillation  Hold pradaxa for falls  Metoprolol and diltiazem    6. Hypothyroidism  Synthroid    7. History of TIA  Hold pradaxa    8. BPH  Tamsulosin and detrol    9.  Polycythemia  Hydroxyurea  Hgb 9.8    DVT prophylaxis: SCD's  Ulcer prophylaxis: protonix    Code status: DNR    Update wife, Ama Jeffrey, at [phone]    Time spent for evaluation: 65 minutes      Signed By: Jorge Morgan     February 3, 2021

## 2021-02-04 ENCOUNTER — APPOINTMENT (OUTPATIENT)
Dept: MRI IMAGING | Age: 86
DRG: 556 | End: 2021-02-04
Attending: PHYSICIAN ASSISTANT
Payer: MEDICARE

## 2021-02-04 LAB
ALBUMIN SERPL-MCNC: 2.9 G/DL (ref 3.5–5)
ALBUMIN/GLOB SERPL: 0.7 {RATIO} (ref 1.1–2.2)
ALP SERPL-CCNC: 97 U/L (ref 45–117)
ALT SERPL-CCNC: 14 U/L (ref 12–78)
ANION GAP SERPL CALC-SCNC: 5 MMOL/L (ref 5–15)
AST SERPL W P-5'-P-CCNC: 6 U/L (ref 15–37)
BASOPHILS # BLD: 0 K/UL (ref 0–0.1)
BASOPHILS NFR BLD: 0 % (ref 0–1)
BILIRUB SERPL-MCNC: 0.7 MG/DL (ref 0.2–1)
BUN SERPL-MCNC: 18 MG/DL (ref 6–20)
BUN/CREAT SERPL: 17 (ref 12–20)
CA-I BLD-MCNC: 9 MG/DL (ref 8.5–10.1)
CHLORIDE SERPL-SCNC: 104 MMOL/L (ref 97–108)
CO2 SERPL-SCNC: 28 MMOL/L (ref 21–32)
CREAT SERPL-MCNC: 1.05 MG/DL (ref 0.7–1.3)
DIFFERENTIAL METHOD BLD: ABNORMAL
EOSINOPHIL # BLD: 0 K/UL (ref 0–0.4)
EOSINOPHIL NFR BLD: 0 % (ref 0–7)
ERYTHROCYTE [DISTWIDTH] IN BLOOD BY AUTOMATED COUNT: 14.6 % (ref 11.5–14.5)
GLOBULIN SER CALC-MCNC: 4 G/DL (ref 2–4)
GLUCOSE BLD STRIP.AUTO-MCNC: 118 MG/DL (ref 65–100)
GLUCOSE BLD STRIP.AUTO-MCNC: 125 MG/DL (ref 65–100)
GLUCOSE BLD STRIP.AUTO-MCNC: 168 MG/DL (ref 65–100)
GLUCOSE SERPL-MCNC: 122 MG/DL (ref 65–100)
HCT VFR BLD AUTO: 37.9 % (ref 36.6–50.3)
HGB BLD-MCNC: 13.2 G/DL (ref 12.1–17)
IMM GRANULOCYTES # BLD AUTO: 0 K/UL (ref 0–0.04)
IMM GRANULOCYTES NFR BLD AUTO: 0 % (ref 0–0.5)
LYMPHOCYTES # BLD: 1 K/UL (ref 0.8–3.5)
LYMPHOCYTES NFR BLD: 16 % (ref 12–49)
MCH RBC QN AUTO: 44.1 PG (ref 26–34)
MCHC RBC AUTO-ENTMCNC: 34.8 G/DL (ref 30–36.5)
MCV RBC AUTO: 126.8 FL (ref 80–99)
MONOCYTES # BLD: 0.5 K/UL (ref 0–1)
MONOCYTES NFR BLD: 8 % (ref 5–13)
NEUTS SEG # BLD: 4.8 K/UL (ref 1.8–8)
NEUTS SEG NFR BLD: 76 % (ref 32–75)
PERFORMED BY, TECHID: ABNORMAL
PLATELET # BLD AUTO: 209 K/UL (ref 150–400)
PMV BLD AUTO: 10.3 FL (ref 8.9–12.9)
POTASSIUM SERPL-SCNC: 3.9 MMOL/L (ref 3.5–5.1)
PROT SERPL-MCNC: 6.9 G/DL (ref 6.4–8.2)
RBC # BLD AUTO: 2.99 M/UL (ref 4.1–5.7)
SODIUM SERPL-SCNC: 137 MMOL/L (ref 136–145)
WBC # BLD AUTO: 6.3 K/UL (ref 4.1–11.1)

## 2021-02-04 PROCEDURE — 97530 THERAPEUTIC ACTIVITIES: CPT

## 2021-02-04 PROCEDURE — 85025 COMPLETE CBC W/AUTO DIFF WBC: CPT

## 2021-02-04 PROCEDURE — 74011250637 HC RX REV CODE- 250/637: Performed by: INTERNAL MEDICINE

## 2021-02-04 PROCEDURE — 36415 COLL VENOUS BLD VENIPUNCTURE: CPT

## 2021-02-04 PROCEDURE — 99218 HC RM OBSERVATION: CPT

## 2021-02-04 PROCEDURE — 80053 COMPREHEN METABOLIC PANEL: CPT

## 2021-02-04 PROCEDURE — 96372 THER/PROPH/DIAG INJ SC/IM: CPT

## 2021-02-04 PROCEDURE — 65270000029 HC RM PRIVATE

## 2021-02-04 PROCEDURE — 97162 PT EVAL MOD COMPLEX 30 MIN: CPT

## 2021-02-04 PROCEDURE — 97166 OT EVAL MOD COMPLEX 45 MIN: CPT

## 2021-02-04 PROCEDURE — 95816 EEG AWAKE AND DROWSY: CPT | Performed by: PSYCHIATRY & NEUROLOGY

## 2021-02-04 PROCEDURE — 82962 GLUCOSE BLOOD TEST: CPT

## 2021-02-04 PROCEDURE — 74011250636 HC RX REV CODE- 250/636: Performed by: PHYSICIAN ASSISTANT

## 2021-02-04 PROCEDURE — 74011250636 HC RX REV CODE- 250/636: Performed by: INTERNAL MEDICINE

## 2021-02-04 PROCEDURE — 74011250637 HC RX REV CODE- 250/637: Performed by: PHYSICIAN ASSISTANT

## 2021-02-04 RX ORDER — DILTIAZEM HYDROCHLORIDE 120 MG/1
240 CAPSULE, COATED, EXTENDED RELEASE ORAL DAILY
Status: CANCELLED | OUTPATIENT
Start: 2021-02-05

## 2021-02-04 RX ORDER — FINASTERIDE 5 MG/1
1 TABLET, FILM COATED ORAL DAILY
COMMUNITY
End: 2021-03-03

## 2021-02-04 RX ORDER — ASPIRIN 325 MG
325 TABLET ORAL DAILY
Status: DISCONTINUED | OUTPATIENT
Start: 2021-02-05 | End: 2021-02-08 | Stop reason: HOSPADM

## 2021-02-04 RX ORDER — DIGOXIN 125 MCG
1 TABLET ORAL DAILY
COMMUNITY
End: 2021-03-03

## 2021-02-04 RX ORDER — METOPROLOL SUCCINATE 50 MG/1
1 TABLET, EXTENDED RELEASE ORAL DAILY
COMMUNITY
End: 2021-03-03

## 2021-02-04 RX ORDER — ASPIRIN 81 MG/1
1 TABLET ORAL DAILY
COMMUNITY
End: 2021-03-03

## 2021-02-04 RX ORDER — HYDROXYZINE 50 MG/ML
25 INJECTION, SOLUTION INTRAMUSCULAR
Status: DISCONTINUED | OUTPATIENT
Start: 2021-02-04 | End: 2021-02-04

## 2021-02-04 RX ORDER — ALOGLIPTIN 25 MG/1
1 TABLET, FILM COATED ORAL
COMMUNITY
End: 2021-03-03

## 2021-02-04 RX ORDER — ATORVASTATIN CALCIUM 40 MG/1
40 TABLET, FILM COATED ORAL DAILY
Status: DISCONTINUED | OUTPATIENT
Start: 2021-02-05 | End: 2021-02-08 | Stop reason: HOSPADM

## 2021-02-04 RX ORDER — DABIGATRAN ETEXILATE 75 MG/1
75 CAPSULE ORAL EVERY 12 HOURS
Status: DISCONTINUED | OUTPATIENT
Start: 2021-02-04 | End: 2021-02-08 | Stop reason: HOSPADM

## 2021-02-04 RX ORDER — LISINOPRIL 40 MG/1
1 TABLET ORAL DAILY
COMMUNITY
End: 2021-03-03

## 2021-02-04 RX ORDER — DILTIAZEM HYDROCHLORIDE 120 MG/1
240 CAPSULE, COATED, EXTENDED RELEASE ORAL DAILY
Status: DISCONTINUED | OUTPATIENT
Start: 2021-02-04 | End: 2021-02-08 | Stop reason: HOSPADM

## 2021-02-04 RX ORDER — HYDROXYZINE 50 MG/ML
25 INJECTION, SOLUTION INTRAMUSCULAR
Status: DISCONTINUED | OUTPATIENT
Start: 2021-02-04 | End: 2021-02-08 | Stop reason: HOSPADM

## 2021-02-04 RX ORDER — ATORVASTATIN CALCIUM 20 MG/1
1 TABLET, FILM COATED ORAL DAILY
COMMUNITY
End: 2021-03-03

## 2021-02-04 RX ORDER — HYDROXYZINE HYDROCHLORIDE 25 MG/ML
25 INJECTION, SOLUTION INTRAMUSCULAR
Status: COMPLETED | OUTPATIENT
Start: 2021-02-04 | End: 2021-02-04

## 2021-02-04 RX ADMIN — Medication 10 ML: at 14:00

## 2021-02-04 RX ADMIN — HYDROXYUREA 500 MG: 500 CAPSULE ORAL at 10:12

## 2021-02-04 RX ADMIN — TAMSULOSIN HYDROCHLORIDE 0.4 MG: 0.4 CAPSULE ORAL at 10:12

## 2021-02-04 RX ADMIN — OXYBUTYNIN CHLORIDE 5 MG: 5 TABLET ORAL at 10:12

## 2021-02-04 RX ADMIN — HYDROXYZINE HYDROCHLORIDE 25 MG: 50 INJECTION, SOLUTION INTRAMUSCULAR at 18:32

## 2021-02-04 RX ADMIN — DILTIAZEM HYDROCHLORIDE 240 MG: 120 CAPSULE, COATED, EXTENDED RELEASE ORAL at 13:54

## 2021-02-04 RX ADMIN — LEVOTHYROXINE SODIUM 50 MCG: 0.03 TABLET ORAL at 10:12

## 2021-02-04 RX ADMIN — Medication 10 ML: at 06:00

## 2021-02-04 RX ADMIN — HYDROXYZINE HYDROCHLORIDE 25 MG: 25 INJECTION, SOLUTION INTRAMUSCULAR at 10:16

## 2021-02-04 NOTE — ED NOTES
TRANSFER - OUT REPORT:    Verbal report given to [address] (name) on Louise Parker  being transferred to  (unit) for routine progression of care       Report consisted of patients Situation, Background, Assessment and   Recommendations(SBAR). Information from the following report(s) SBAR was reviewed with the receiving nurse. Lines:   Peripheral IV 02/03/21 Left;Posterior Hand (Active)        Opportunity for questions and clarification was provided.       Patient transported with:   [employer]

## 2021-02-04 NOTE — PROGRESS NOTES
PHYSICAL THERAPY EVALUATION  Patient: Bharathi Anderson (59 y.o. male)  Date: 2/4/2021  Primary Diagnosis: Fall [W19. XXXA]        Precautions: falls       ASSESSMENT  Pt is an 79 yo male admitted on 2/3/2021 s/p fall 2 days ago at home; Pelvic CT negative for acute fracture or dislocation, right hip XR shows moderate DJD of both hips, lumbar spine CT shows abdominal aorta atherosclerosis, no evidence of vertebral fracture. Orthopedic consult completed, pt is WBAT BLE, pt refusing MRI. Per noted pt has demonstrated increased confusion since admission. PMH: afib, DM, HLD, HTN, hypothyroiodism, polycythemia vera, prostatic hyperplasia, spinal stenosis, and TIA. Pt A&O x name, birth month and year, place with options. Per pt report pt resides with wife in a 1 with 0 THEA, pt states he was I with ADLS and IADLS but unclear PLOF due to A&O. Based on the objective data described below, the patient presents with generalized weakness, impaired functional mobility, inability to amb, impaired balance, and decreased activity tolerance. Pt semi-supine in bed upon PT/OT arrival, agreeable to evaluation. Pt required max A x2 for bed mobility, max A x2 with increased time for command following supine <> sit transfers, total A x2 with inability to clear bottom for sit <> stand transfers with 4 attempts. Pt did poor with session today currently requiring increased assistance for all mobility and inability to amb. Pt will benefit from continued skilled PT to address above deficits and return to PLOF. Current PT DC recommendation SNF.      Current Level of Function Impacting Discharge (mobility/balance): max to total A x2    Other factors to consider for discharge: unclear PLOF      PLAN :  Recommendations and Planned Interventions: bed mobility training, transfer training, gait training, therapeutic exercises, patient and family training/education and therapeutic activities      Frequency/Duration: Patient will be followed by physical therapy:  5 times a week to address goals. Recommendation for discharge: (in order for the patient to meet his/her long term goals)  SNF    This discharge recommendation:  Has been made in collaboration with the attending provider and/or case management    IF patient discharges home will need the following DME: none         SUBJECTIVE:   Patient stated yes.  when asked if he wanted to try and stand    OBJECTIVE DATA SUMMARY:   HISTORY:    Past Medical History:   Diagnosis Date    [facility] ([email or web address]     Diabetes mellitus type 2, uncomplicated ([email or web address]     Hyperlipidemia     Hypertension     Hypothyroidism     Polycythemia vera ([email or web address]     Prostatic hyperplasia     Spinal stenosis     Transient ischemic attack      Past Surgical History:   Procedure Laterality Date    HX OTHER SURGICAL      None       Home Situation  Home Environment: Private residence  # Steps to Enter: 0  One/Two Story Residence: One story  Living Alone: No  Support Systems: Spouse/Significant Other/Partner  Current DME Used/Available at Home: None    EXAMINATION/PRESENTATION/DECISION MAKING:   Critical Behavior:  Neurologic State: Alert  Orientation Level: Oriented to person, Disoriented to place, Disoriented to time(birth month and year, not oriented to time or place)  Cognition: Poor safety awareness, Impaired decision making, Decreased command following     Hearing:     Skin:  Intact where visible  Edema: none noted   Range Of Motion:  AROM: Generally decreased, functional           PROM: Within functional limits           Strength:    Strength: Generally decreased, functional(right LE 3-/5, left LE 4-/5)        Functional Mobility:  Bed Mobility:  Rolling: Maximum assistance;Assist x2  Supine to Sit: Maximum assistance;Assist x2  Sit to Supine: Maximum assistance;Assist x2  Scooting: Maximum assistance;Assist x2  Transfers:  Sit to Stand:  Total assistance;Assist x2(unable to clear bottom x4 attempts ) Balance:   Sitting: Impaired; With support  Sitting - Static: Poor (constant support); Prop sitting(right sided lean)  Sitting - Dynamic: Poor (constant support); Prop sitting(right sided lean )  Ambulation/Gait Training:   not completed this session    Therapeutic Exercises:   Not completed this session    Functional Measure:  [facility] Short Form  How much difficulty does the patient currently have. .. Unable A Lot A Little None   1. Turning over in bed (including adjusting bedclothes, sheets and blankets)? [] 1   [x] 2   [] 3   [] 4   2. Sitting down on and standing up from a chair with arms ( e.g., wheelchair, bedside commode, etc.)   [x] 1   [] 2   [] 3   [] 4   3. Moving from lying on back to sitting on the side of the bed? [] 1   [x] 2   [] 3   [] 4          How much help from another person does the patient currently need. .. Total A Lot A Little None   4. Moving to and from a bed to a chair (including a wheelchair)? [x] 1   [] 2   [] 3   [] 4   5. Need to walk in hospital room? [x] 1   [] 2   [] 3   [] 4   6. Climbing 3-5 steps with a railing? [x] 1   [] 2   [] 3   [] 4   © 2007, Trustees of [facility], under license to [employer]. All rights reserved     Score:  Initial: 8/24 Most Recent: X (Date: 2/4/2021 )   Interpretation of Tool:  Represents activities that are increasingly more difficult (i.e. Bed mobility, Transfers, Gait).   Score 24 23 22-20 19-15 14-10 9-7 6   Modifier CH CI CJ CK CL CM CN         Physical Therapy Evaluation Charge Determination   History Examination Presentation Decision-Making   HIGH Complexity :3+ comorbidities / personal factors will impact the outcome/ POC  HIGH Complexity : 4+ Standardized tests and measures addressing body structure, function, activity limitation and / or participation in recreation  MEDIUM Complexity : Evolving with changing characteristics  Other outcome measures [facility] 6  high      Based on the above components, the patient evaluation is determined to be of the following complexity level: MEDIUM    Pain Ratin/10     Activity Tolerance:   Poor and requires frequent rest breaks    After treatment patient left in no apparent distress:   Supine in bed, Call bell within reach, Bed / chair alarm activated and Side rails x 3 and nsg updated. GOALS:    Problem: Mobility Impaired (Adult and Pediatric)  Goal: *Acute Goals and Plan of Care (Insert Text)  Description: Pt will be I with LE HEP in 7 days. Pt will perform bed mobility with mod I in 7 days. Pt will perform transfers with mod I in 7 days. Pt will amb 25-50 feet with LRAD safely with mod I in 7 days. Outcome: Not Met       COMMUNICATION/EDUCATION:   The patients plan of care was discussed with: Occupational therapist and Registered nurse. Patient is unable to participate in goal setting and plan of care. PT/OT sessions occurred together for increased safety of pt and clinician.       Thank you for this referral.  Joseph Waddell, PT, DPT   Time Calculation: 24 mins

## 2021-02-04 NOTE — PROGRESS NOTES
Problem: Self Care Deficits Care Plan (Adult)  Goal: *Acute Goals and Plan of Care (Insert Text)  Description: Pt will be min A sup<->sit in prep for EOB ADL's  Pt will be min A  LB dressing EOB level  Pt will be SBA  sit EOB 10 minutes in prep for EOB ADL's  Pt will be SBA  grooming EOB level  Pt will be min A  sit<-> prep for toilet transfer  Pt will be min A  BSC transfer with LRAD  Pt will be min A  toileting/cloth mgmt LRAD  Pt will progress to min A grooming standing sink  Pt will be SBA lane UE HEP in prep for self care tasks      Outcome: Not Met     OCCUPATIONAL THERAPY EVALUATION  Patient: Verla Eisenmenger (81 y.o. male)  Date: 2/4/2021  Primary Diagnosis: Fall [W19. XXXA]        Precautions: Falls       ASSESSMENT  Pt is 81 y/o male came to [facility] with right hip pain s/p fall (fell backwards when trying to stand up from chair_ and placed under observation 2/3/2021 for GLF, right hip pain (negative testing for fractures), hypothyroidism. Head CT showing stable white matter changes with chronic microvascular disease, Pelvic CT negative for acute fracture or dislocation, ight hip X0ray showing moderate DJD lane hips, Lumbar CT showing abdominal aorta atherosclerosis negative fx. MRI pending. Pt has hx of DM type II, dementia, HTN, spinal stenosis, HLD, Afib, TIA, prostatic hyperplasia, TIA, polycythermia. Pt received semi supine in bed, A&O to name, birth month and year not date and place when provided options and agreeable for OT/PT eval/tx. Per pt report, pt lives with spouse in one story home with walk in entrance and is independent for self care and functional transfers/mobility howevar accuracy of information provided unknown (per chart review, pt shuffls at home when walking).       Pt currently presents with decreased balance, decreased activity tolerance, decreased safety awareness, generalized weakness (lane UE grossly 4+/5), increased time required to follow commands and increased need for assist with self care (min A simple grooming simulated, total A toileting bed level, total A LB dressing simulated EOB 2/2 sitting balance) and functional transfers/mobility (max Ax2 sup<->sit, scooting EOB and total Ax2 sit<-S>tand x4 attempts unable to clear bottom with lean to left noted). Pt would benefit from skilled OT services while at [facility] in order to increase safety and independence with self care and functional transfers/mobility. Recommend discharge to SNF when medically appropriate. Other factors to consider for discharge: time since onset, severity of deficits, PLOF unknown        PLAN :  Recommendations and Planned Interventions: self care training, functional mobility training, therapeutic exercise, balance training, therapeutic activities, endurance activities, patient education, and home safety training    Frequency/Duration: Patient will be followed by occupational therapy 5 times a week to address goals.     Recommendation for discharge: (in order for the patient to meet his/her long term goals)  SNF    This discharge recommendation:  Has been made in collaboration with the attending provider and/or case management    IF patient discharges home will need the following DME: TBD       SUBJECTIVE:   Patient stated yes when asked if he wanted to try standing again    OBJECTIVE DATA SUMMARY:   HISTORY:   Past Medical History:   Diagnosis Date    Atrial fibrillation ([facility] [address]     Diabetes mellitus type 2, uncomplicated ([facility] [address]     Hyperlipidemia     Hypertension     Hypothyroidism     Polycythemia vera ([facility] [address]     Prostatic hyperplasia     Spinal stenosis     Transient ischemic attack      Past Surgical History:   Procedure Laterality Date    HX OTHER SURGICAL      None       Expanded or extensive additional review of patient history:     Home Situation  Home Environment: Private residence  # Steps to Enter: 0  One/Two Story Residence: One story  Living Alone: No  Support Systems: Spouse/Significant Other/Partner  Current DME Used/Available at Home: None    PLOF: pt stating he was independent for self care and transfers however accuracy of information provided unknown    EXAMINATION OF PERFORMANCE DEFICITS:  Cognitive/Behavioral Status:  Neurologic State: Alert  Orientation Level: Oriented to person;Disoriented to place; Disoriented to time(birth month and year, not oriented to time or place)  Cognition: Poor safety awareness; Impaired decision making;Decreased command following     Range of Motion:  AROM: Generally decreased, functional  PROM: Within functional limits     Strength:  Strength: Generally decreased, functional(grossly observed to be 4+/5)     Balance:  Sitting: Impaired; With support  Sitting - Static: Poor (constant support); Prop sitting(right sided lean)  Sitting - Dynamic: Poor (constant support); Prop sitting(right sided lean )    Functional Mobility and Transfers for ADLs:  Bed Mobility:  Rolling: Maximum assistance;Assist x2  Supine to Sit: Maximum assistance;Assist x2  Sit to Supine: Maximum assistance;Assist x2  Scooting: Maximum assistance;Assist x2    Transfers:  Sit to Stand: Total assistance;Assist x2(unable to clear bottom x4 attempts )    ADL Assessment:     Oral Facial Hygiene/Grooming: Minimum assistance(simulated bed level)    Lower Body Dressing: Total assistance    Toileting: Total assistance     ADL Intervention and task modifications:     Grooming  Grooming Assistance: Minimum assistance  Position Performed: (semi supine in bed)    Lower Body Dressing Assistance  Socks: Total assistance (dependent)    Toileting  Toileting Assistance: Total assistance(dependent)  Bladder Hygiene: Total assistance (dependent)  Clothing Management: Total assistance (dependent)       [address] [facility] [address] [address] AM-PACTM \"6 Clicks\"                                                       Daily Activity Inpatient Short Form  How much help from another person does the patient currently need. ..  Total; A Lot A Little None   1. Putting on and taking off regular lower body clothing? []  1 [x]  2 []  3 []  4   2. Bathing (including washing, rinsing, drying)? []  1 [x]  2 []  3 []  4   3. Toileting, which includes using toilet, bedpan or urinal? [] 1 [x]  2 []  3 []  4   4. Putting on and taking off regular upper body clothing? []  1 []  2 [x]  3 []  4   5. Taking care of personal grooming such as brushing teeth? []  1 []  2 [x]  3 []  4   6. Eating meals? []  1 []  2 [x]  3 []  4   © 2007, [facility] of [address] [place] [address] [address] [address], under license to "[employer]". All rights reserved     Score: 15/24     Interpretation of Tool:  Represents clinically-significant functional categories (i.e. Activities of daily living). Percentage of Impairment CH    0%   CI    1-19% CJ    20-39% CK    40-59% CL    60-79% CM    80-99% CN     100%   [facility]  Score 6-24 24 23 20-22 15-19 10-14 7-9 6        Occupational Therapy Evaluation Charge Determination   History Examination Decision-Making   MEDIUM Complexity : Expanded review of history including physical, cognitive and psychosocial  history  MEDIUM Complexity : 3-5 performance deficits relating to physical, cognitive , or psychosocial skils that result in activity limitations and / or participation restrictions MEDIUM Complexity : Patient may present with comorbidities that affect occupational performnce. Miniml to moderate modification of tasks or assistance (eg, physical or verbal ) with assesment(s) is necessary to enable patient to complete evaluation       Based on the above components, the patient evaluation is determined to be of the following complexity level: MEDIUM  Pain Rating:  No pain reported    Activity Tolerance:   Poor and requires rest breaks  Please refer to the flowsheet for vital signs taken during this treatment.     After treatment patient left in no apparent distress:    Supine in bed, Call bell within reach, Bed / chair alarm activated, and Side rails x 3    COMMUNICATION/EDUCATION:   The patients plan of care was discussed with: Physical therapist and Registered nurse. PT/OT sessions occurred together for increased safety of pt and clinician. Home safety education was provided and the patient/caregiver indicated understanding. and Patient/family have participated as able in goal setting and plan of care. This patients plan of care is appropriate for delegation to hospitals.     Thank you for this referral.  Мария Goncalves  Time Calculation: 24 mins

## 2021-02-04 NOTE — CONSULTS
ORTHOPEDIC CONSULT    Patient: Alejandro Barber MRN: [id number]  SSN: [id number]    YOB: 1934  Age: 80 y.o. Sex: male      Subjective:      Alejandro Barber is a 80 y.o. male who is being seen in orthopedic consultation for right hip pain. Patient was seen in the emergency room. At my arrival, the patient states he has no pain at this time. The patient has a history of dementia and is not the best historian but he does recall falling 2 days ago because of pain in his right hip. The patient states he does fall often he has very limited ambulation with the use of a walker. He states his wife is at home and helps him perform most of his task throughout the day. He denies any numbness or tingling his lower extremity. He states he has been pretty weak in his legs over the last several weeks. He denies any injuries to his head or cervical spine from his fall. He denies any other musculoskeletal complaints at this time. Past Medical History:   Diagnosis Date    Atrial fibrillation ([facility] [address]     Diabetes mellitus type 2, uncomplicated (HCC)     Hyperlipidemia     Hypertension     Hypothyroidism     Polycythemia vera ([facility] [address]     Prostatic hyperplasia     Spinal stenosis     Transient ischemic attack      Past Surgical History:   Procedure Laterality Date    HX OTHER SURGICAL      None      Family History   Problem Relation Age of Onset    Hypertension Mother     Stroke Mother     Lung Cancer Father     Melanoma Father      Social History     Tobacco Use    Smoking status: Not on file   Substance Use Topics    Alcohol use: Never     Frequency: Never     Binge frequency: Never      Prior to Admission medications    Medication Sig Start Date End Date Taking? Authorizing Provider   levothyroxine (SYNTHROID) 50 mcg tablet Take 50 mcg by mouth Daily (before breakfast). Yes Provider, Historical   glyBURIDE (DIABETA) 5 mg tablet Take 5 mg by mouth Daily (before breakfast).    Yes Provider, Historical   metFORMIN 500 mg/5 mL soln Take 500 mg by mouth two (2) times a day. Yes Provider, Historical   SITagliptin (Januvia) 100 mg tablet Take 100 mg by mouth daily. Yes Provider, Historical   dilTIAZem ER (DILACOR XR) 240 mg capsule Take 240 mg by mouth daily. Yes Provider, Historical   dabigatran etexilate (Pradaxa) 75 mg capsule Take 75 mg by mouth every twelve (12) hours. Yes Provider, Historical   tamsulosin (FLOMAX) 0.4 mg capsule Take 0.4 mg by mouth daily. Yes Provider, Historical   tolterodine ER (DETROL LA) 4 mg ER capsule Take 4 mg by mouth daily. Yes Provider, Historical   hydroxyurea (HYDREA) 500 mg capsule Take 500 mg by mouth two (2) times a day.    Yes Provider, Historical       No Known Allergies    Review of Systems:  Review of Systems   Unable to perform ROS: Dementia         Objective:     Current Facility-Administered Medications   Medication Dose Route Frequency    dilTIAZem ER (CARDIZEM CD) capsule 240 mg  240 mg Oral DAILY    sodium chloride (NS) flush 5-40 mL  5-40 mL IntraVENous Q8H    sodium chloride (NS) flush 5-40 mL  5-40 mL IntraVENous PRN    acetaminophen (TYLENOL) tablet 650 mg  650 mg Oral Q6H PRN    Or    acetaminophen (TYLENOL) suppository 650 mg  650 mg Rectal Q6H PRN    polyethylene glycol (MIRALAX) packet 17 g  17 g Oral DAILY PRN    promethazine (PHENERGAN) tablet 12.5 mg  12.5 mg Oral Q6H PRN    Or    ondansetron (ZOFRAN) injection 4 mg  4 mg IntraVENous Q6H PRN    hydroxyurea (HYDREA) chemo cap 500 mg  500 mg Oral BID    levothyroxine (SYNTHROID) tablet 50 mcg  50 mcg Oral ACB    tamsulosin (FLOMAX) capsule 0.4 mg  0.4 mg Oral DAILY    oxybutynin (DITROPAN) tablet 5 mg  5 mg Oral TID    glucose chewable tablet 16 g  4 Tab Oral PRN    dextrose (D50W) injection syrg 12.5-25 g  25-50 mL IntraVENous PRN    glucagon (GLUCAGEN) injection 1 mg  1 mg IntraMUSCular PRN    insulin lispro (HUMALOG) injection   SubCUTAneous AC&HS    oxyCODONE IR (ROXICODONE) tablet 5 mg  5 mg Oral Q4H PRN    traMADoL (ULTRAM) tablet 50 mg  50 mg Oral Q6H PRN     Current Outpatient Medications   Medication Sig    levothyroxine (SYNTHROID) 50 mcg tablet Take 50 mcg by mouth Daily (before breakfast).  glyBURIDE (DIABETA) 5 mg tablet Take 5 mg by mouth Daily (before breakfast).  metFORMIN 500 mg/5 mL soln Take 500 mg by mouth two (2) times a day.  SITagliptin (Januvia) 100 mg tablet Take 100 mg by mouth daily.  dilTIAZem ER (DILACOR XR) 240 mg capsule Take 240 mg by mouth daily.  dabigatran etexilate (Pradaxa) 75 mg capsule Take 75 mg by mouth every twelve (12) hours.  tamsulosin (FLOMAX) 0.4 mg capsule Take 0.4 mg by mouth daily.  tolterodine ER (DETROL LA) 4 mg ER capsule Take 4 mg by mouth daily.  hydroxyurea (HYDREA) 500 mg capsule Take 500 mg by mouth two (2) times a day. Vitals:    02/04/21 0634 02/04/21 0721 02/04/21 0852 02/04/21 1130   BP: (!) 144/95 135/88 119/71 120/79   Pulse: 77 75 96 81   Resp: 16 28 16 18   Temp:       SpO2: 93% 95% 97% 97%   Weight:       Height:            Alert and oriented x3, No apparent distress  Physical Exam:  Lower extremities: There is no foreshortening or malrotation seen of his lower extremities. There are well-healing abrasions seen of his bilateral knees anterior aspect. No erythema ecchymosis seen. No swelling of his knees seem. Sensation was intact throughout bilateral lower extremities. There was no tenderness to internal/external rotation of his hips. He is unable to actively straight leg raise. I was able to passively straight leg raise his lower extremities past 70 degrees without discomfort. There was no tenderness to abduction or adduction. I was able to passively flex his bilateral knees and there was some tenderness elicited with flexion past 70 degrees on his right. There was no tenderness to passive range of motion of his left knee.   There was mild tenderness palpation of the medial femoral condyle of his right knee. There was no tenderness or laxity to varus valgus stressing. Negative Juan's test negative drawer test.  There is no tenderness palpation throughout his tibias. No calf pain to palpation. DP/PT pulses are palpable. Cap refill is 2 seconds. EHL/DF was 2 out of 5. Plantar flexion was 4 out of 5. Bilateral lower extremities appear neurovascularly intact. Labs:  CBC:  Recent Labs     02/04/21  0910 02/03/21  1050   WBC 6.3 5.7   RBC 2.99* 2.29*   HGB 13.2 9.8*   HCT 37.9 29.7*   .8* 129.7*   RDW 14.6* 14.9*    180     CHEMISTRIES:  Recent Labs     02/04/21  0910 02/03/21  1050    136   K 3.9 4.6    104   CO2 28 25   BUN 18 21*   CREA 1.05 1.24   CA 9.0 8.8   PT/INR:No results for input(s): INR, INREXT in the last 72 hours. No lab exists for component: PROTIME  APTT:No results for input(s): APTT in the last 72 hours. LIVER PROFILE:  Recent Labs     02/04/21  0910 02/03/21  1050   AST 6* 28   ALT 14 19       IMAGING:  X-rays and CT scans taken of his pelvis and right hip shows no acute fractures. There is moderate arthritic changes seen of his hips. No other bony deformities are present. CT scan taken of his lumbar spine showed no acute fractures. Mild degenerative changes seen throughout. Assessment/Plan:     Hospital Problems  Never Reviewed          Codes Class Noted POA    Fall ICD-10-CM: W19. Elbert Sickle  ICD-9-CM: Q072.0  2/3/2021 Unknown            Degenerative joint disease bilateral hips  No acute orthopedic surgical intervention needed at this time. The patient can ambulate weightbearing as tolerated. He does have a history of spinal stenosis per report. Patient may benefit from anti-inflammatory steroid dosing. The patient can follow-up with Dr. Miki Kelly as outpatient once he is discharged from the hospital if needed. Orthopedics will sign off at this time. Please call us back if there is any other acute issues.     This patient was seen in direct consult with Dr. Pawel Handley. Thank you for the courtesy of this consult.   Signed By: Yoav Macdonald PA-C     February 4, 2021

## 2021-02-04 NOTE — PROGRESS NOTES
Hospitalist Progress Note    Subjective:   Daily Progress Note: 2/4/2021     Keon Palmer is a 80 y.o. male with history of dementia, type 2 diabetes, hypertension, spinal stenosis, hyperlipidemia, and atrial fibrillation who presented to the ED with right hip pain after a fall 2 days prior. He reports he tried to stand up out of a chair when he fell backwards mostly onto his right side/buttocks. He was able to ambulate with pain 2 days ago and it has progressively become worse. He is unable to ambulate secondary to pain. Head CT shows stable white matter changes with chronic microvascular disease. Pelvic CT negative for acute fracture or dislocation. 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 Lumbar spine CT shows abdominal aorta atherosclerosis, no evidence of vertebral fracture. Ortho consulted. Patient became more confused today and refuses MRI. Patient examined at bedside. He is confused and not cooperative.      Current Facility-Administered Medications   Medication Dose Route Frequency    dilTIAZem ER (CARDIZEM CD) capsule 240 mg  240 mg Oral DAILY    sodium chloride (NS) flush 5-40 mL  5-40 mL IntraVENous Q8H    sodium chloride (NS) flush 5-40 mL  5-40 mL IntraVENous PRN    acetaminophen (TYLENOL) tablet 650 mg  650 mg Oral Q6H PRN    Or    acetaminophen (TYLENOL) suppository 650 mg  650 mg Rectal Q6H PRN    polyethylene glycol (MIRALAX) packet 17 g  17 g Oral DAILY PRN    promethazine (PHENERGAN) tablet 12.5 mg  12.5 mg Oral Q6H PRN    Or    ondansetron (ZOFRAN) injection 4 mg  4 mg IntraVENous Q6H PRN    hydroxyurea (HYDREA) chemo cap 500 mg  500 mg Oral BID    levothyroxine (SYNTHROID) tablet 50 mcg  50 mcg Oral ACB    tamsulosin (FLOMAX) capsule 0.4 mg  0.4 mg Oral DAILY    oxybutynin (DITROPAN) tablet 5 mg  5 mg Oral TID    glucose chewable tablet 16 g  4 Tab Oral PRN    dextrose (D50W) injection syrg 12.5-25 g  25-50 mL IntraVENous PRN    glucagon (GLUCAGEN) injection 1 mg  1 mg IntraMUSCular PRN    insulin lispro (HUMALOG) injection   SubCUTAneous AC&HS    oxyCODONE IR (ROXICODONE) tablet 5 mg  5 mg Oral Q4H PRN    traMADoL (ULTRAM) tablet 50 mg  50 mg Oral Q6H PRN     Current Outpatient Medications   Medication Sig    levothyroxine (SYNTHROID) 50 mcg tablet Take 50 mcg by mouth Daily (before breakfast).  glyBURIDE (DIABETA) 5 mg tablet Take 5 mg by mouth Daily (before breakfast).  metFORMIN 500 mg/5 mL soln Take 500 mg by mouth two (2) times a day.  SITagliptin (Januvia) 100 mg tablet Take 100 mg by mouth daily.  dilTIAZem ER (DILACOR XR) 240 mg capsule Take 240 mg by mouth daily.  dabigatran etexilate (Pradaxa) 75 mg capsule Take 75 mg by mouth every twelve (12) hours.  tamsulosin (FLOMAX) 0.4 mg capsule Take 0.4 mg by mouth daily.  tolterodine ER (DETROL LA) 4 mg ER capsule Take 4 mg by mouth daily.  hydroxyurea (HYDREA) 500 mg capsule Take 500 mg by mouth two (2) times a day. Review of Systems  Review of Systems   Unable to perform ROS: Dementia            Objective:     Visit Vitals  /71   Pulse 96   Temp 98.9 °F (37.2 °C)   Resp 16   Ht 6' 1\" (1.854 m)   Wt 210 lb (95.3 kg)   SpO2 97%   BMI 27.71 kg/m²      O2 Device: Room air    Temp (24hrs), Av °F (37.2 °C), Min:98.9 °F (37.2 °C), Max:99.1 °F (37.3 °C)      No intake/output data recorded. No intake/output data recorded.     Recent Results (from the past 24 hour(s))   GLUCOSE, POC    Collection Time: 21 11:46 PM   Result Value Ref Range    Glucose (POC) 159 (H) 65 - 100 mg/dL    Performed by Zachariah Sue, POC    Collection Time: 21  8:47 AM   Result Value Ref Range    Glucose (POC) 118 (H) 65 - 100 mg/dL    Performed by [place]    CBC WITH AUTOMATED DIFF    Collection Time: 21  9:10 AM   Result Value Ref Range    WBC 6.3 4.1 - 11.1 K/uL    RBC 2.99 (L) 4.10 - 5.70 M/uL    HGB 13.2 12.1 - 17.0 g/dL    HCT 37.9 36.6 - 50.3 %    .8 (H) 80.0 - 99.0 FL    MCH 44.1 (H) 26.0 - 34.0 PG    MCHC 34.8 30.0 - 36.5 g/dL    RDW 14.6 (H) 11.5 - 14.5 %    PLATELET 397 841 - 650 K/uL    MPV 10.3 8.9 - 12.9 FL    NEUTROPHILS 76 (H) 32 - 75 %    LYMPHOCYTES 16 12 - 49 %    MONOCYTES 8 5 - 13 %    EOSINOPHILS 0 0 - 7 %    BASOPHILS 0 0 - 1 %    IMMATURE GRANULOCYTES 0 0.0 - 0.5 %    ABS. NEUTROPHILS 4.8 1.8 - 8.0 K/UL    ABS. LYMPHOCYTES 1.0 0.8 - 3.5 K/UL    ABS. MONOCYTES 0.5 0.0 - 1.0 K/UL    ABS. EOSINOPHILS 0.0 0.0 - 0.4 K/UL    ABS. BASOPHILS 0.0 0.0 - 0.1 K/UL    ABS. IMM. GRANS. 0.0 0.00 - 0.04 K/UL    DF AUTOMATED     METABOLIC PANEL, COMPREHENSIVE    Collection Time: 02/04/21  9:10 AM   Result Value Ref Range    Sodium 137 136 - 145 mmol/L    Potassium 3.9 3.5 - 5.1 mmol/L    Chloride 104 97 - 108 mmol/L    CO2 28 21 - 32 mmol/L    Anion gap 5 5 - 15 mmol/L    Glucose 122 (H) 65 - 100 mg/dL    BUN 18 6 - 20 mg/dL    Creatinine 1.05 0.70 - 1.30 mg/dL    BUN/Creatinine ratio 17 12 - 20      GFR est AA >60 >60 ml/min/1.73m2    GFR est non-AA >60 >60 ml/min/1.73m2    Calcium 9.0 8.5 - 10.1 mg/dL    Bilirubin, total 0.7 0.2 - 1.0 mg/dL    AST (SGOT) 6 (L) 15 - 37 U/L    ALT (SGPT) 14 12 - 78 U/L    Alk. phosphatase 97 45 - 117 U/L    Protein, total 6.9 6.4 - 8.2 g/dL    Albumin 2.9 (L) 3.5 - 5.0 g/dL    Globulin 4.0 2.0 - 4.0 g/dL    A-G Ratio 0.7 (L) 1.1 - 2.2          CT PELV WO CONT   Final Result   No pelvic fracture. No fracture or dislocation either hip. Abdominal aorta and pelvic arteries atherosclerosis. Prostate enlargement. XR HIP RT W OR WO PELV 2-3 VWS   Final Result      XR CHEST SNGL V   Final Result      CT SPINE LUMB WO CONT   Final Result   No CT evidence for lumbar vertebral body compression deformity,   retropulsed bone, or malalignment. Noted advanced abdominal aorta atherosclerosis. With any persistent clinical concern for radicular-type signs or symptoms, MRI   of the lumbar spine may be warranted.       CT HEAD WO CONT   Final Result      1. No acute intracranial abnormality      2. Stable underlying white matter changes, compatible with moderate to severe   chronic microvascular disease. 3. Ventricles appear slightly prominent for the degree of cerebral volume loss,   which may represent central volume loss. Normal pressure hydrocephalus may have   overlapping CT appearance and correlation with physical examination is   recommended. MRI HIP  RT  WO CONT    (Results Pending)   MRI LUMB SPINE WO CONT    (Results Pending)        PHYSICAL EXAM:    Physical Exam  Constitutional:       General: He is not in acute distress. Appearance: Normal appearance. He is normal weight. HENT:      Head: Normocephalic and atraumatic. Right Ear: External ear normal.      Left Ear: External ear normal.      Nose: Nose normal.      Mouth/Throat:      Mouth: Mucous membranes are dry. Pharynx: Oropharynx is clear. Cardiovascular:      Rate and Rhythm: Normal rate. Rhythm irregular. Heart sounds: Normal heart sounds. Pulmonary:      Effort: Pulmonary effort is normal.      Breath sounds: Normal breath sounds. Musculoskeletal:         General: No tenderness or signs of injury. Right lower leg: Edema present. Left lower leg: No edema. Skin:     Findings: Bruising (lower extremities have mild bruising) present. Neurological:      General: No focal deficit present. Mental Status: He is alert. He is disoriented. Motor: Weakness present.           Data Review    Recent Results (from the past 24 hour(s))   GLUCOSE, POC    Collection Time: 02/03/21 11:46 PM   Result Value Ref Range    Glucose (POC) 159 (H) 65 - 100 mg/dL    Performed by Jeffy Jiang, POC    Collection Time: 02/04/21  8:47 AM   Result Value Ref Range    Glucose (POC) 118 (H) 65 - 100 mg/dL    Performed by [place]    CBC WITH AUTOMATED DIFF    Collection Time: 02/04/21  9:10 AM   Result Value Ref Range    WBC 6.3 4.1 - 11.1 K/uL    RBC 2.99 (L) 4.10 - 5.70 M/uL    HGB 13.2 12.1 - 17.0 g/dL    HCT 37.9 36.6 - 50.3 %    .8 (H) 80.0 - 99.0 FL    MCH 44.1 (H) 26.0 - 34.0 PG    MCHC 34.8 30.0 - 36.5 g/dL    RDW 14.6 (H) 11.5 - 14.5 %    PLATELET 312 339 - 599 K/uL    MPV 10.3 8.9 - 12.9 FL    NEUTROPHILS 76 (H) 32 - 75 %    LYMPHOCYTES 16 12 - 49 %    MONOCYTES 8 5 - 13 %    EOSINOPHILS 0 0 - 7 %    BASOPHILS 0 0 - 1 %    IMMATURE GRANULOCYTES 0 0.0 - 0.5 %    ABS. NEUTROPHILS 4.8 1.8 - 8.0 K/UL    ABS. LYMPHOCYTES 1.0 0.8 - 3.5 K/UL    ABS. MONOCYTES 0.5 0.0 - 1.0 K/UL    ABS. EOSINOPHILS 0.0 0.0 - 0.4 K/UL    ABS. BASOPHILS 0.0 0.0 - 0.1 K/UL    ABS. IMM. GRANS. 0.0 0.00 - 0.04 K/UL    DF AUTOMATED     METABOLIC PANEL, COMPREHENSIVE    Collection Time: 02/04/21  9:10 AM   Result Value Ref Range    Sodium 137 136 - 145 mmol/L    Potassium 3.9 3.5 - 5.1 mmol/L    Chloride 104 97 - 108 mmol/L    CO2 28 21 - 32 mmol/L    Anion gap 5 5 - 15 mmol/L    Glucose 122 (H) 65 - 100 mg/dL    BUN 18 6 - 20 mg/dL    Creatinine 1.05 0.70 - 1.30 mg/dL    BUN/Creatinine ratio 17 12 - 20      GFR est AA >60 >60 ml/min/1.73m2    GFR est non-AA >60 >60 ml/min/1.73m2    Calcium 9.0 8.5 - 10.1 mg/dL    Bilirubin, total 0.7 0.2 - 1.0 mg/dL    AST (SGOT) 6 (L) 15 - 37 U/L    ALT (SGPT) 14 12 - 78 U/L    Alk. phosphatase 97 45 - 117 U/L    Protein, total 6.9 6.4 - 8.2 g/dL    Albumin 2.9 (L) 3.5 - 5.0 g/dL    Globulin 4.0 2.0 - 4.0 g/dL    A-G Ratio 0.7 (L) 1.1 - 2.2          Assessment/Plan:     1. 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 Orthopedic consult, no treatment necessary  MRI not indicated as he now has flexion and extension of the hip  Tramadol and oxycodone for pain  PT/OT evaluation appreciated     2.  Ground level fall  UA negative for UTI  Troponin negative  Head CT shows stable white matter changes with chronic microvascular disease     3. Type II diabetes  SSI     4. Hypertension  Metoprolol  Blood pressure well-controlled     5. Atrial fibrillation  Hold pradaxa for falls  Metoprolol and diltiazem     6. Hypothyroidism  Synthroid     7. History of TIA  Hold pradaxa  Stroke work up:  EKG  Carotid duplex  Neurology consult  ECHO  MRI  Lipid panel     8. BPH  Tamsulosin and detrol     9. Polycythemia  Hydroxyurea  Hgb 13.2     DVT prophylaxis: SCD's  Ulcer prophylaxis: protonix     Code status: DNR     Update wife, [employer], at [phone]    Care Plan discussed with: Patient/Family, Nurse and     Total time spent with patient: 33 minutes.

## 2021-02-05 ENCOUNTER — APPOINTMENT (OUTPATIENT)
Dept: NON INVASIVE DIAGNOSTICS | Age: 86
DRG: 556 | End: 2021-02-05
Attending: PHYSICIAN ASSISTANT
Payer: MEDICARE

## 2021-02-05 LAB
ALBUMIN SERPL-MCNC: 2.7 G/DL (ref 3.5–5)
ALBUMIN/GLOB SERPL: 0.6 {RATIO} (ref 1.1–2.2)
ALP SERPL-CCNC: 98 U/L (ref 45–117)
ALT SERPL-CCNC: 13 U/L (ref 12–78)
ANION GAP SERPL CALC-SCNC: 7 MMOL/L (ref 5–15)
AST SERPL W P-5'-P-CCNC: 10 U/L (ref 15–37)
ATRIAL RATE: 59 BPM
BASOPHILS # BLD: 0 K/UL (ref 0–0.1)
BASOPHILS NFR BLD: 0 % (ref 0–1)
BILIRUB SERPL-MCNC: 0.7 MG/DL (ref 0.2–1)
BUN SERPL-MCNC: 22 MG/DL (ref 6–20)
BUN/CREAT SERPL: 20 (ref 12–20)
CA-I BLD-MCNC: 8.9 MG/DL (ref 8.5–10.1)
CALCULATED R AXIS, ECG10: -75 DEGREES
CALCULATED T AXIS, ECG11: 55 DEGREES
CHLORIDE SERPL-SCNC: 102 MMOL/L (ref 97–108)
CO2 SERPL-SCNC: 27 MMOL/L (ref 21–32)
CREAT SERPL-MCNC: 1.12 MG/DL (ref 0.7–1.3)
DIAGNOSIS, 93000: NORMAL
DIFFERENTIAL METHOD BLD: ABNORMAL
ECHO AO ROOT DIAM: 3.4 CM
ECHO AR MAX VEL PISA: 311 CM/S
ECHO AV PEAK GRADIENT: 11 MMHG
ECHO AV REGURGITANT PHT: 593 MS
ECHO EST RA PRESSURE: 3 MMHG
ECHO LA AREA 4C: 15.26 CM2
ECHO LA MAJOR AXIS: 4.9 CM
ECHO LA MINOR AXIS: 2.23 CM
ECHO LA TO AORTIC ROOT RATIO: 1.44
ECHO LV E' SEPTAL VELOCITY: 6.73 CM/S
ECHO LV EDV A2C: 83.5 CM3
ECHO LV EJECTION FRACTION BIPLANE: 79 % (ref 55–100)
ECHO LV ESV A2C: 9 CM3
ECHO LV INTERNAL DIMENSION DIASTOLIC: 4.37 CM (ref 4.2–5.9)
ECHO LV INTERNAL DIMENSION SYSTOLIC: 2.08 CM
ECHO LV IVSD: 1.19 CM (ref 0.6–1)
ECHO LV MASS 2D: 193.9 G (ref 88–224)
ECHO LV MASS INDEX 2D: 88.3 G/M2 (ref 49–115)
ECHO LV POSTERIOR WALL DIASTOLIC: 1.25 CM (ref 0.6–1)
ECHO LVOT PEAK GRADIENT: 5 MMHG
ECHO MV A VELOCITY: 50 CM/S
ECHO MV AREA PHT: 4.07 CM2
ECHO MV E DECELERATION TIME (DT): 197 MS
ECHO MV E VELOCITY: 88.2 CM/S
ECHO MV E/A RATIO: 1.76
ECHO MV E/E' SEPTAL: 13.11
ECHO MV PRESSURE HALF TIME (PHT): 54 MS
ECHO PV PEAK INSTANTANEOUS GRADIENT SYSTOLIC: 5 MMHG
ECHO PV PEAK INSTANTANEOUS GRADIENT SYSTOLIC: 6 MMHG
ECHO PV REGURGITANT MAX VELOCITY: 113 CM/S
ECHO PV REGURGITANT MAX VELOCITY: 114 CM/S
ECHO PV REGURGITANT MAX VELOCITY: 126 CM/S
ECHO PV REGURGITANT MAX VELOCITY: 165 CM/S
ECHO PV REGURGITANT MAX VELOCITY: 503 CM/S
ECHO RA AREA 4C: 21.95 CM2
ECHO RIGHT VENTRICULAR SYSTOLIC PRESSURE (RVSP): 41 MMHG
ECHO RV INTERNAL DIMENSION: 3.89 CM
ECHO TV MAX VELOCITY: 309 CM/S
ECHO TV REGURGITANT PEAK GRADIENT: 38 MMHG
EOSINOPHIL # BLD: 0 K/UL (ref 0–0.4)
EOSINOPHIL NFR BLD: 0 % (ref 0–7)
ERYTHROCYTE [DISTWIDTH] IN BLOOD BY AUTOMATED COUNT: 14.7 % (ref 11.5–14.5)
GLOBULIN SER CALC-MCNC: 4.5 G/DL (ref 2–4)
GLUCOSE BLD STRIP.AUTO-MCNC: 133 MG/DL (ref 65–100)
GLUCOSE BLD STRIP.AUTO-MCNC: 154 MG/DL (ref 65–100)
GLUCOSE BLD STRIP.AUTO-MCNC: 157 MG/DL (ref 65–100)
GLUCOSE BLD STRIP.AUTO-MCNC: 170 MG/DL (ref 65–100)
GLUCOSE SERPL-MCNC: 176 MG/DL (ref 65–100)
HCT VFR BLD AUTO: 41.7 % (ref 36.6–50.3)
HGB BLD-MCNC: 14.3 G/DL (ref 12.1–17)
IMM GRANULOCYTES # BLD AUTO: 0 K/UL (ref 0–0.04)
IMM GRANULOCYTES NFR BLD AUTO: 0 % (ref 0–0.5)
LEFT CCA DIST DIAS: 8.8 CM/S
LEFT CCA DIST SYS: 46.6 CM/S
LEFT CCA PROX DIAS: 12.2 CM/S
LEFT CCA PROX SYS: 119 CM/S
LEFT ECA DIAS: 8.78 CM/S
LEFT ECA SYS: 53.8 CM/S
LEFT ICA DIST DIAS: 21 CM/S
LEFT ICA DIST SYS: 79.7 CM/S
LEFT ICA PROX DIAS: 15.3 CM/S
LEFT ICA PROX SYS: 49.6 CM/S
LEFT SUBCLAVIAN DIAS: 0 CM/S
LEFT SUBCLAVIAN SYS: 40.2 CM/S
LEFT VERTEBRAL DIAS: 14.1 CM/S
LEFT VERTEBRAL SYS: 52.3 CM/S
LYMPHOCYTES # BLD: 0.7 K/UL (ref 0.8–3.5)
LYMPHOCYTES NFR BLD: 8 % (ref 12–49)
MCH RBC QN AUTO: 43.9 PG (ref 26–34)
MCHC RBC AUTO-ENTMCNC: 34.3 G/DL (ref 30–36.5)
MCV RBC AUTO: 127.9 FL (ref 80–99)
MONOCYTES # BLD: 0.6 K/UL (ref 0–1)
MONOCYTES NFR BLD: 7 % (ref 5–13)
MV DEC SLOPE: 6560 MM/S2
MV DEC SLOPE: 6560 MM/S2
NEUTS SEG # BLD: 7.3 K/UL (ref 1.8–8)
NEUTS SEG NFR BLD: 85 % (ref 32–75)
NRBC # BLD: 0 K/UL (ref 0–0.01)
NRBC BLD-RTO: 0 PER 100 WBC
PERFORMED BY, TECHID: ABNORMAL
PLATELET # BLD AUTO: 223 K/UL (ref 150–400)
PMV BLD AUTO: 10.3 FL (ref 8.9–12.9)
POTASSIUM SERPL-SCNC: 3.5 MMOL/L (ref 3.5–5.1)
PROT SERPL-MCNC: 7.2 G/DL (ref 6.4–8.2)
Q-T INTERVAL, ECG07: 386 MS
QRS DURATION, ECG06: 86 MS
QTC CALCULATION (BEZET), ECG08: 453 MS
RBC # BLD AUTO: 3.26 M/UL (ref 4.1–5.7)
RIGHT CCA DIST DIAS: 7.6 CM/S
RIGHT CCA DIST SYS: 45.7 CM/S
RIGHT CCA PROX DIAS: 10.5 CM/S
RIGHT CCA PROX SYS: 79.7 CM/S
RIGHT ECA DIAS: 10.7 CM/S
RIGHT ECA SYS: 82.5 CM/S
RIGHT ICA DIST DIAS: 9.9 CM/S
RIGHT ICA DIST SYS: 44.3 CM/S
RIGHT ICA PROX DIAS: 15.9 CM/S
RIGHT ICA PROX SYS: 62.7 CM/S
RIGHT SUBCLAVIAN DIAS: 0 CM/S
RIGHT SUBCLAVIAN SYS: 52.5 CM/S
RIGHT VERTEBRAL DIAS: 10.3 CM/S
RIGHT VERTEBRAL SYS: 59.7 CM/S
SODIUM SERPL-SCNC: 136 MMOL/L (ref 136–145)
VENTRICULAR RATE, ECG03: 83 BPM
WBC # BLD AUTO: 8.7 K/UL (ref 4.1–11.1)

## 2021-02-05 PROCEDURE — 74011250637 HC RX REV CODE- 250/637: Performed by: PHYSICIAN ASSISTANT

## 2021-02-05 PROCEDURE — 80061 LIPID PANEL: CPT

## 2021-02-05 PROCEDURE — 93880 EXTRACRANIAL BILAT STUDY: CPT

## 2021-02-05 PROCEDURE — 85025 COMPLETE CBC W/AUTO DIFF WBC: CPT

## 2021-02-05 PROCEDURE — 36415 COLL VENOUS BLD VENIPUNCTURE: CPT

## 2021-02-05 PROCEDURE — 82962 GLUCOSE BLOOD TEST: CPT

## 2021-02-05 PROCEDURE — 74011250636 HC RX REV CODE- 250/636: Performed by: PHYSICIAN ASSISTANT

## 2021-02-05 PROCEDURE — 83036 HEMOGLOBIN GLYCOSYLATED A1C: CPT

## 2021-02-05 PROCEDURE — 93306 TTE W/DOPPLER COMPLETE: CPT

## 2021-02-05 PROCEDURE — 65270000029 HC RM PRIVATE

## 2021-02-05 PROCEDURE — 80053 COMPREHEN METABOLIC PANEL: CPT

## 2021-02-05 PROCEDURE — 74011250637 HC RX REV CODE- 250/637: Performed by: INTERNAL MEDICINE

## 2021-02-05 RX ADMIN — Medication 10 ML: at 05:36

## 2021-02-05 RX ADMIN — HYDROXYUREA 500 MG: 500 CAPSULE ORAL at 20:41

## 2021-02-05 RX ADMIN — TAMSULOSIN HYDROCHLORIDE 0.4 MG: 0.4 CAPSULE ORAL at 13:26

## 2021-02-05 RX ADMIN — DILTIAZEM HYDROCHLORIDE 240 MG: 120 CAPSULE, COATED, EXTENDED RELEASE ORAL at 13:25

## 2021-02-05 RX ADMIN — LEVOTHYROXINE SODIUM 50 MCG: 0.03 TABLET ORAL at 13:25

## 2021-02-05 RX ADMIN — OXYBUTYNIN CHLORIDE 5 MG: 5 TABLET ORAL at 20:41

## 2021-02-05 RX ADMIN — HYDROXYUREA 500 MG: 500 CAPSULE ORAL at 13:28

## 2021-02-05 RX ADMIN — ATORVASTATIN CALCIUM 40 MG: 40 TABLET, FILM COATED ORAL at 13:26

## 2021-02-05 RX ADMIN — ASPIRIN 325 MG ORAL TABLET 325 MG: 325 PILL ORAL at 13:25

## 2021-02-05 RX ADMIN — Medication 10 ML: at 00:03

## 2021-02-05 RX ADMIN — OXYBUTYNIN CHLORIDE 5 MG: 5 TABLET ORAL at 13:25

## 2021-02-05 NOTE — PROGRESS NOTES
Hospitalist Progress Note    Subjective:   Daily Progress Note: 2/5/2021     Horacio Rizo is a 80 y.o. male with history of dementia, type 2 diabetes, hypertension, spinal stenosis, hyperlipidemia, and atrial fibrillation who presented to the ED with right hip pain after a fall 2 days prior. He reports he tried to stand up out of a chair when he fell backwards mostly onto his right side/buttocks. He was able to ambulate with pain 2 days ago and it has progressively become worse. He is unable to ambulate secondary to pain. Head CT shows stable white matter changes with chronic microvascular disease. Pelvic CT negative for acute fracture or dislocation. 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 Lumbar spine CT shows abdominal aorta atherosclerosis, no evidence of vertebral fracture. Ortho consulted. Patient became more confused today and refuses MRI. Patient examined at bedside. He is confused and not cooperative.      Current Facility-Administered Medications   Medication Dose Route Frequency    dilTIAZem ER (CARDIZEM CD) capsule 240 mg  240 mg Oral DAILY    atorvastatin (LIPITOR) tablet 40 mg  40 mg Oral DAILY    aspirin tablet 325 mg  325 mg Oral DAILY    dabigatran etexilate (PRADAXA) capsule 75 mg  75 mg Oral Q12H    hydrOXYzine (VISTARIL) injection 25 mg  25 mg IntraMUSCular Q6H PRN    sodium chloride (NS) flush 5-40 mL  5-40 mL IntraVENous Q8H    sodium chloride (NS) flush 5-40 mL  5-40 mL IntraVENous PRN    acetaminophen (TYLENOL) tablet 650 mg  650 mg Oral Q6H PRN    Or    acetaminophen (TYLENOL) suppository 650 mg  650 mg Rectal Q6H PRN    polyethylene glycol (MIRALAX) packet 17 g  17 g Oral DAILY PRN    promethazine (PHENERGAN) tablet 12.5 mg  12.5 mg Oral Q6H PRN    Or    ondansetron (ZOFRAN) injection 4 mg  4 mg IntraVENous Q6H PRN    hydroxyurea (HYDREA) chemo cap 500 mg  500 mg Oral BID    levothyroxine (SYNTHROID) tablet 50 mcg  50 mcg Oral ACB    tamsulosin (FLOMAX) capsule 0.4 mg  0.4 mg Oral DAILY    oxybutynin (DITROPAN) tablet 5 mg  5 mg Oral TID    glucose chewable tablet 16 g  4 Tab Oral PRN    dextrose (D50W) injection syrg 12.5-25 g  25-50 mL IntraVENous PRN    glucagon (GLUCAGEN) injection 1 mg  1 mg IntraMUSCular PRN    insulin lispro (HUMALOG) injection   SubCUTAneous AC&HS    oxyCODONE IR (ROXICODONE) tablet 5 mg  5 mg Oral Q4H PRN    traMADoL (ULTRAM) tablet 50 mg  50 mg Oral Q6H PRN        Review of Systems  Review of Systems   Unable to perform ROS: Dementia            Objective:     Visit Vitals  BP (!) 111/93 (BP 1 Location: Left upper arm, BP Patient Position: At rest)   Pulse (!) 107   Temp 99 °F (37.2 °C)   Resp 18   Ht 6' 1\" (1.854 m)   Wt 95.3 kg (210 lb)   SpO2 95%   BMI 27.71 kg/m²      O2 Device: Room air    Temp (24hrs), Av.4 °F (36.9 °C), Min:98 °F (36.7 °C), Max:99 °F (37.2 °C)      No intake/output data recorded.    1901 -  0700  In: 150 [P.O.:150]  Out: -     Recent Results (from the past 24 hour(s))   GLUCOSE, POC    Collection Time: 21  7:44 PM   Result Value Ref Range    Glucose (POC) 168 (H) 65 - 100 mg/dL    Performed by [id number] [facility], POC    Collection Time: 21  8:02 AM   Result Value Ref Range    Glucose (POC) 170 (H) 65 - 100 mg/dL    Performed by [id number], Se    Collection Time: 21 10:28 AM   Result Value Ref Range    Left CCA dist sys 46.6 cm/s    Left CCA dist [place] 8.8 cm/s    Left CCA prox sys 119.0 cm/s    Left CCA prox [place] 12.2 cm/s    Left ICA dist sys 79.7 cm/s    Left ICA dist [place] 21.0 cm/s    Left ICA prox sys 49.6 cm/s    Left ICA prox [place] 15.3 cm/s    Left ECA sys 53.8 cm/s    LEFT EXTERNAL CAROTID ARTERY D 8.78 cm/s    Left subclavian sys 40.2 cm/s    LEFT SUBCLAVIAN ARTERY D 0.00 cm/s    Left vertebral sys 52.3 cm/s    LEFT VERTEBRAL ARTERY D 14.10 cm/s    Right cca dist sys 45.7 cm/s    Right CCA dist [place] 7.6 cm/s    Right CCA prox sys 79.7 cm/s Right CCA prox [place] 10.5 cm/s    Right ICA dist sys 44.3 cm/s    Right ICA dist [place] 9.9 cm/s    Right ICA prox sys 62.7 cm/s    Right ICA prox [place] 15.9 cm/s    Right eca sys 82.5 cm/s    RIGHT EXTERNAL CAROTID ARTERY D 10.70 cm/s    Right subclavian sys 52.5 cm/s    RIGHT SUBCLAVIAN ARTERY D 0.00 cm/s    Right vertebral sys 59.7 cm/s    RIGHT VERTEBRAL ARTERY D 10.30 cm/s   ECHO ADULT COMPLETE    Collection Time: 02/05/21 12:15 PM   Result Value Ref Range    Aortic Regurgitant Pressure Half-time 593.00 ms    AR Max Alon 311.00 cm/s    Pulmonic Regurgitant End Max Velocity 165.00 cm/s    AoV PG 11.00 mmHg    Ao Root D 3.40 cm    IVSd 1.19 (A) 0.6 - 1.0 cm    LVIDd 4.37 4.2 - 5.9 cm    LVIDs 2.08 cm    Pulmonic Regurgitant End Max Velocity 114.00 cm/s    LVOT Peak Gradient 5.00 mmHg    LVPWd 1.25 (A) 0.6 - 1.0 cm    LV E' Septal Velocity 6.73 cm/s    LV ED Vol A2C 83.50 cm3    BP EF 79.0 55 - 100 %    LV ES Vol A2C 9.00 cm3    E/E' septal 13.11     Left Atrium Major Axis 4.90 cm    Left Atrium to Aortic Root Ratio 1.44     Pulmonic Regurgitant End Max Velocity 503.00 cm/s    Mitral Valve Deceleration [place] 6,560.00 mm/s2    Mitral Valve Deceleration [place] 6,560.00 mm/s2    Mitral Valve E Wave Deceleration Time 197.00 ms    Mitral Valve Pressure Half-time 54.00 ms    MV A Alon 50.00 cm/s    MV E Alon 88.20 cm/s    MVA (PHT) 4.07 cm2    MV E/A 1.76     Pulmonic Regurgitant End Max Velocity 113.00 cm/s    Pulmonic Valve Systolic Peak Instantaneous Gradient 5.00 mmHg    Pulmonic Regurgitant End Max Velocity 126.00 cm/s    Pulmonic Valve Systolic Peak Instantaneous Gradient 6.00 mmHg    Est. RA Pressure 3.00 mmHg    RVIDd 3.89 cm    RVSP 41.00 mmHg    Tricuspid Valve Max Velocity 309.00 cm/s    Triscuspid Valve Regurgitation Peak Gradient 38.00 mmHg    Right Atrial Area 4C 21.95 cm2    LA Area 4C 15.26 cm2    LV Mass .9 88 - 224 g    LV Mass AL Index 88.3 49 - 115 g/m2    Left Atrium Minor Axis 2.23 cm   GLUCOSE, POC    Collection Time: 02/05/21 12:30 PM   Result Value Ref Range    Glucose (POC) 154 (H) 65 - 100 mg/dL    Performed by AREN THOMAS    CBC WITH AUTOMATED DIFF    Collection Time: 02/05/21  1:37 PM   Result Value Ref Range    WBC 8.7 4.1 - 11.1 K/uL    RBC 3.26 (L) 4.10 - 5.70 M/uL    HGB 14.3 12.1 - 17.0 g/dL    HCT 41.7 36.6 - 50.3 %    .9 (H) 80.0 - 99.0 FL    MCH 43.9 (H) 26.0 - 34.0 PG    MCHC 34.3 30.0 - 36.5 g/dL    RDW 14.7 (H) 11.5 - 14.5 %    PLATELET 147 547 - 190 K/uL    MPV 10.3 8.9 - 12.9 FL    NRBC 0.0 0  WBC    ABSOLUTE NRBC 0.00 0.00 - 0.01 K/uL    NEUTROPHILS 85 (H) 32 - 75 %    LYMPHOCYTES 8 (L) 12 - 49 %    MONOCYTES 7 5 - 13 %    EOSINOPHILS 0 0 - 7 %    BASOPHILS 0 0 - 1 %    IMMATURE GRANULOCYTES 0 0.0 - 0.5 %    ABS. NEUTROPHILS 7.3 1.8 - 8.0 K/UL    ABS. LYMPHOCYTES 0.7 (L) 0.8 - 3.5 K/UL    ABS. MONOCYTES 0.6 0.0 - 1.0 K/UL    ABS. EOSINOPHILS 0.0 0.0 - 0.4 K/UL    ABS. BASOPHILS 0.0 0.0 - 0.1 K/UL    ABS. IMM. GRANS. 0.0 0.00 - 0.04 K/UL    DF AUTOMATED     METABOLIC PANEL, COMPREHENSIVE    Collection Time: 02/05/21  1:37 PM   Result Value Ref Range    Sodium 136 136 - 145 mmol/L    Potassium 3.5 3.5 - 5.1 mmol/L    Chloride 102 97 - 108 mmol/L    CO2 27 21 - 32 mmol/L    Anion gap 7 5 - 15 mmol/L    Glucose 176 (H) 65 - 100 mg/dL    BUN 22 (H) 6 - 20 mg/dL    Creatinine 1.12 0.70 - 1.30 mg/dL    BUN/Creatinine ratio 20 12 - 20      GFR est AA >60 >60 ml/min/1.73m2    GFR est non-AA >60 >60 ml/min/1.73m2    Calcium 8.9 8.5 - 10.1 mg/dL    Bilirubin, total 0.7 0.2 - 1.0 mg/dL    AST (SGOT) 10 (L) 15 - 37 U/L    ALT (SGPT) 13 12 - 78 U/L    Alk.  phosphatase 98 45 - 117 U/L    Protein, total 7.2 6.4 - 8.2 g/dL    Albumin 2.7 (L) 3.5 - 5.0 g/dL    Globulin 4.5 (H) 2.0 - 4.0 g/dL    A-G Ratio 0.6 (L) 1.1 - 2.2     GLUCOSE, POC    Collection Time: 02/05/21  3:32 PM   Result Value Ref Range    Glucose (POC) 157 (H) 65 - 100 mg/dL    Performed by Bharathi Joe         [facility] WO CONT   Final Result   No pelvic fracture. No fracture or dislocation either hip. Abdominal aorta and pelvic arteries atherosclerosis. Prostate enlargement. XR HIP RT W OR WO PELV 2-3 VWS   Final Result      XR CHEST SNGL V   Final Result      CT SPINE LUMB WO CONT   Final Result   No CT evidence for lumbar vertebral body compression deformity,   retropulsed bone, or malalignment. Noted advanced abdominal aorta atherosclerosis. With any persistent clinical concern for radicular-type signs or symptoms, MRI   of the lumbar spine may be warranted. CT HEAD WO CONT   Final Result      1. No acute intracranial abnormality      2. Stable underlying white matter changes, compatible with moderate to severe   chronic microvascular disease. 3. Ventricles appear slightly prominent for the degree of cerebral volume loss,   which may represent central volume loss. Normal pressure hydrocephalus may have   overlapping CT appearance and correlation with physical examination is   recommended. PHYSICAL EXAM:    Physical Exam  Constitutional:       General: He is not in acute distress. Appearance: Normal appearance. He is normal weight. HENT:      Head: Normocephalic and atraumatic. Right Ear: External ear normal.      Left Ear: External ear normal.      Nose: Nose normal.      Mouth/Throat:      Mouth: Mucous membranes are dry. Pharynx: Oropharynx is clear. Cardiovascular:      Rate and Rhythm: Normal rate. Rhythm irregular. Heart sounds: Normal heart sounds. Pulmonary:      Effort: Pulmonary effort is normal.      Breath sounds: Normal breath sounds. Musculoskeletal:         General: No tenderness or signs of injury. Right lower leg: Edema present. Left lower leg: No edema. Skin:     Findings: Bruising (lower extremities have mild bruising) present. Neurological:      General: No focal deficit present. Mental Status: He is alert.  He is disoriented. Motor: Weakness present.           Data Review    Recent Results (from the past 24 hour(s))   GLUCOSE, POC    Collection Time: 02/04/21  7:44 PM   Result Value Ref Range    Glucose (POC) 168 (H) 65 - 100 mg/dL    Performed by [id number] [facility], POC    Collection Time: 02/05/21  8:02 AM   Result Value Ref Range    Glucose (POC) 170 (H) 65 - 100 mg/dL    [facility] [id number] [facility] CAROT[facility]    Collection Time: 02/05/21 10:28 AM   Result Value Ref Range    Left CCA dist sys 46.6 cm/s    Left CCA dist [place] 8.8 cm/s    Left CCA prox sys 119.0 cm/s    Left CCA prox [place] 12.2 cm/s    Left ICA dist sys 79.7 cm/s    Left ICA dist [place] 21.0 cm/s    Left ICA prox sys 49.6 cm/s    Left ICA prox [place] 15.3 cm/s    Left ECA sys 53.8 cm/s    LEFT EXTERNAL CAROTID ARTERY D 8.78 cm/s    Left subclavian sys 40.2 cm/s    LEFT SUBCLAVIAN ARTERY D 0.00 cm/s    Left vertebral sys 52.3 cm/s    LEFT VERTEBRAL ARTERY D 14.10 cm/s    Right cca dist sys 45.7 cm/s    Right CCA dist [place] 7.6 cm/s    Right CCA prox sys 79.7 cm/s    Right CCA prox [place] 10.5 cm/s    Right ICA dist sys 44.3 cm/s    Right ICA dist [place] 9.9 cm/s    Right ICA prox sys 62.7 cm/s    Right ICA prox [place] 15.9 cm/s    Right eca sys 82.5 cm/s    RIGHT EXTERNAL CAROTID ARTERY D 10.70 cm/s    Right subclavian sys 52.5 cm/s    RIGHT SUBCLAVIAN ARTERY D 0.00 cm/s    Right vertebral sys 59.7 cm/s    RIGHT VERTEBRAL ARTERY D 10.30 cm/s   ECHO ADULT COMPLETE    Collection Time: 02/05/21 12:15 PM   Result Value Ref Range    Aortic Regurgitant Pressure Half-time 593.00 ms    AR Max Alon 311.00 cm/s    Pulmonic Regurgitant End Max Velocity 165.00 cm/s    AoV PG 11.00 mmHg    Ao Root D 3.40 cm    IVSd 1.19 (A) 0.6 - 1.0 cm    LVIDd 4.37 4.2 - 5.9 cm    LVIDs 2.08 cm    Pulmonic Regurgitant End Max Velocity 114.00 cm/s    LVOT Peak Gradient 5.00 mmHg    LVPWd 1.25 (A) 0.6 - 1.0 cm    LV E' Septal Velocity 6.73 cm/s    LV ED Vol A2C 83.50 cm3    BP EF 79.0 55 - 100 %    LV ES Vol A2C 9.00 cm3    E/E' septal 13.11     Left Atrium Major Axis 4.90 cm    Left Atrium to Aortic Root Ratio 1.44     Pulmonic Regurgitant End Max Velocity 503.00 cm/s    Mitral Valve Deceleration [place] 6,560.00 mm/s2    Mitral Valve Deceleration [place] 6,560.00 mm/s2    Mitral Valve E Wave Deceleration Time 197.00 ms    Mitral Valve Pressure Half-time 54.00 ms    MV A Alon 50.00 cm/s    MV E Alon 88.20 cm/s    MVA (PHT) 4.07 cm2    MV E/A 1.76     Pulmonic Regurgitant End Max Velocity 113.00 cm/s    Pulmonic Valve Systolic Peak Instantaneous Gradient 5.00 mmHg    Pulmonic Regurgitant End Max Velocity 126.00 cm/s    Pulmonic Valve Systolic Peak Instantaneous Gradient 6.00 mmHg    Est. RA Pressure 3.00 mmHg    RVIDd 3.89 cm    RVSP 41.00 mmHg    Tricuspid Valve Max Velocity 309.00 cm/s    Triscuspid Valve Regurgitation Peak Gradient 38.00 mmHg    Right Atrial Area 4C 21.95 cm2    LA Area 4C 15.26 cm2    LV Mass .9 88 - 224 g    LV Mass AL Index 88.3 49 - 115 g/m2    Left Atrium Minor Axis 2.23 cm   GLUCOSE, POC    Collection Time: 02/05/21 12:30 PM   Result Value Ref Range    Glucose (POC) 154 (H) 65 - 100 mg/dL    Performed by AREN THOMAS    CBC WITH AUTOMATED DIFF    Collection Time: 02/05/21  1:37 PM   Result Value Ref Range    WBC 8.7 4.1 - 11.1 K/uL    RBC 3.26 (L) 4.10 - 5.70 M/uL    HGB 14.3 12.1 - 17.0 g/dL    HCT 41.7 36.6 - 50.3 %    .9 (H) 80.0 - 99.0 FL    MCH 43.9 (H) 26.0 - 34.0 PG    MCHC 34.3 30.0 - 36.5 g/dL    RDW 14.7 (H) 11.5 - 14.5 %    PLATELET 462 119 - 112 K/uL    MPV 10.3 8.9 - 12.9 FL    NRBC 0.0 0  WBC    ABSOLUTE NRBC 0.00 0.00 - 0.01 K/uL    NEUTROPHILS 85 (H) 32 - 75 %    LYMPHOCYTES 8 (L) 12 - 49 %    MONOCYTES 7 5 - 13 %    EOSINOPHILS 0 0 - 7 %    BASOPHILS 0 0 - 1 %    IMMATURE GRANULOCYTES 0 0.0 - 0.5 %    ABS. NEUTROPHILS 7.3 1.8 - 8.0 K/UL    ABS. LYMPHOCYTES 0.7 (L) 0.8 - 3.5 K/UL    ABS. MONOCYTES 0.6 0.0 - 1.0 K/UL    ABS. EOSINOPHILS 0.0 0.0 - 0.4 K/UL    ABS. BASOPHILS 0.0 0.0 - 0.1 K/UL    ABS. IMM. GRANS. 0.0 0.00 - 0.04 K/UL    DF AUTOMATED     METABOLIC PANEL, COMPREHENSIVE    Collection Time: 02/05/21  1:37 PM   Result Value Ref Range    Sodium 136 136 - 145 mmol/L    Potassium 3.5 3.5 - 5.1 mmol/L    Chloride 102 97 - 108 mmol/L    CO2 27 21 - 32 mmol/L    Anion gap 7 5 - 15 mmol/L    Glucose 176 (H) 65 - 100 mg/dL    BUN 22 (H) 6 - 20 mg/dL    Creatinine 1.12 0.70 - 1.30 mg/dL    BUN/Creatinine ratio 20 12 - 20      GFR est AA >60 >60 ml/min/1.73m2    GFR est non-AA >60 >60 ml/min/1.73m2    Calcium 8.9 8.5 - 10.1 mg/dL    Bilirubin, total 0.7 0.2 - 1.0 mg/dL    AST (SGOT) 10 (L) 15 - 37 U/L    ALT (SGPT) 13 12 - 78 U/L    Alk. phosphatase 98 45 - 117 U/L    Protein, total 7.2 6.4 - 8.2 g/dL    Albumin 2.7 (L) 3.5 - 5.0 g/dL    Globulin 4.5 (H) 2.0 - 4.0 g/dL    A-G Ratio 0.6 (L) 1.1 - 2.2     GLUCOSE, POC    Collection Time: 02/05/21  3:32 PM   Result Value Ref Range    Glucose (POC) 157 (H) 65 - 100 mg/dL    Performed by Elizabeth Garcia         Assessment/Plan:     1. 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 Orthopedic consult, no treatment necessary  MRI not indicated as he now has flexion and extension of the hip  Tramadol and oxycodone for pain  PT/OT evaluation appreciated     2. Ground level fall  UA negative for UTI  Troponin negative  Head CT shows stable white matter changes with chronic microvascular disease     3. Type II diabetes  SSI     4. Hypertension  Metoprolol  Blood pressure well-controlled     5. Atrial fibrillation  Hold pradaxa for falls  Metoprolol and diltiazem     6. Hypothyroidism  Synthroid     7. History of TIA  Hold pradaxa  Stroke work up:  EKG  Carotid duplex  Neurology consult, Dr. Luz Dubois  ECHO  Lipid panel     8.  BPH  Tamsulosin and detrol     9. Polycythemia  Hydroxyurea  Hgb 13.2     DVT prophylaxis: SCD's  Ulcer prophylaxis: protonix     Code status: DNR     Update wife, Dee Corona, at [phone]    Care Plan discussed with: Patient/Family, Nurse and     Total time spent with patient: 33 minutes.

## 2021-02-05 NOTE — PROGRESS NOTES
New admission skin assessment done with Montez Carlos. Scab noted on right knee, slight redness noted in perineal area. 2 small pink areas noted on left leg, not open just light pink like a rash

## 2021-02-05 NOTE — PROGRESS NOTES
Bedside shift change report given to Avery Newell RN (oncoming nurse) by Edgardo Blanc LPN (offgoing nurse). Report included the following information SBAR.

## 2021-02-05 NOTE — PROGRESS NOTES
NEURO CONSULT      REASON FOR ADMISSION:  Mental status changes  Rule out stroke      HISTORY:  Ms. 80years old with a reported history of dementia, type 2 diabetes, hypertension, spinal stenosis, dyslipidemia, atrial fibrillation, who is consulted to neurology for mental status changes. Patient has been admitted secondary to apparent recurrent falls. In the ER, patient had a head CT that was negative for acute process. A CT of the pelvis was negative for any acute fracture. Patient was seen at EEG          ROS:    General:                     No fever, no chills, no sweats, no generalized weakness, no weight loss/gain,                                       No loss of appetite   Eyes:                           No blurred vision, no eye pain, no loss of vision, no double vision  ENT:                            rhinorrhea, no pharyngitis   Respiratory:               No cough, no sputum production, no SOB, no DOS SANTOS, no wheezing, no pleuritic pain   Cardiology:                No chest pain, no palpitations, no orthopnea, no PND, no edema, no syncope   Gastrointestinal:       No abdominal pain , no N/V, no diarrhea, no dysphagia, no constipation, no bleeding   Genitourinary:           frequency, no urgency, no dysuria, no hematuria, no incontinence   Muskuloskeletal :      No arthralgia, no myalgia, no back pain  Hematology:              No easy bruising, no nose or gum bleeding, no lymphadenopathy   Dermatological:         No rash, no ulceration, no pruritis, no color change / jaundice  Endocrine:                 hot flashes or polydipsia   Neurological:            Of dementia                                          No Speech difficulties, no memory loss, no gait difficulty  Psychological:          No neelings of anxiety, no depression, no agitation      NEURO EXAM:    Mental status: Patient is awake but repeatedly states, \"I do not know\" for all questions.   He is not aphasic   he was able to follow one-step commands with repeated coaxing. Cranial nerves: Axial visual threat. There is no central or peripheral seventh palsy. Motor exam: Diffusely weak, has some pain.   Passive motion of right leg    Sensory exam: No tactile extinction    Coordination: Did not follow commands    Gait and Station: Patient was not ambulated      ASSESSMENT:  Overall, given the patient's dementia, neurological exam is relatively unreliable  Beclouded dementia  Doubt stroke  Right hip pain, patient has already been seen by orthopedics      PLAN:  There is no strong indication for MRI of the brain on this patient at this time  EEG  Carotid duplex  Cardiac echo  TSH  Fasting lipid profile  PT/OT consult      ALLERGIES:    No Known Allergies    MEDS:      Current Facility-Administered Medications:     dilTIAZem ER (CARDIZEM CD) capsule 240 mg, 240 mg, Oral, DAILY, Lenora Alvares MD, 240 mg at 02/04/21 1354    atorvastatin (LIPITOR) tablet 40 mg, 40 mg, Oral, DAILY, Fern Paez PA-C    aspirin tablet 325 mg, 325 mg, Oral, DAILY, Fern Paez PA-C    dabigatran etexilate (PRADAXA) capsule 75 mg, 75 mg, Oral, Q12H, Fern Paez PA-C, Stopped at 02/04/21 2100    hydrOXYzine (VISTARIL) injection 25 mg, 25 mg, IntraMUSCular, Q6H PRN, Sudhakar Paez PA-C, 25 mg at 02/04/21 1832    sodium chloride (NS) flush 5-40 mL, 5-40 mL, IntraVENous, Q8H, Sudhakar Paez PA-C, 10 mL at 02/05/21 0536    sodium chloride (NS) flush 5-40 mL, 5-40 mL, IntraVENous, PRN, Fern Paez PA-C    acetaminophen (TYLENOL) tablet 650 mg, 650 mg, Oral, Q6H PRN **OR** acetaminophen (TYLENOL) suppository 650 mg, 650 mg, Rectal, Q6H PRN, Fern Paez PA-C    polyethylene glycol (MIRALAX) packet 17 g, 17 g, Oral, DAILY PRN, Fern Paez PA-C    promethazine (PHENERGAN) tablet 12.5 mg, 12.5 mg, Oral, Q6H PRN **OR** ondansetron (ZOFRAN) injection 4 mg, 4 mg, IntraVENous, Q6H PRN, Fern Paez PA-C   hydroxyurea (HYDREA) chemo cap 500 mg, 500 mg, Oral, BID, Nancy Paez PA-C, 500 mg at 02/04/21 1012    levothyroxine (SYNTHROID) tablet 50 mcg, 50 mcg, Oral, ACB, Sudhakar Paez PA-C, 50 mcg at 02/04/21 1012    tamsulosin (FLOMAX) capsule 0.4 mg, 0.4 mg, Oral, DAILY, Sudhakar Paez PA-C, 0.4 mg at 02/04/21 1012    oxybutynin (DITROPAN) tablet 5 mg, 5 mg, Oral, TID, Sudhakar Paez PA-C, 5 mg at 02/04/21 1012    glucose chewable tablet 16 g, 4 Tab, Oral, PRN, Sudhakar Paez PA-C    dextrose (D50W) injection syrg 12.5-25 g, 25-50 mL, IntraVENous, PRN, Nancy Paez PA-C    glucagon (GLUCAGEN) injection 1 mg, 1 mg, IntraMUSCular, PRN, Nancy Paez PA-C    insulin lispro (HUMALOG) injection, , SubCUTAneous, AC&HS, Nancy Paez PA-C, Stopped at 02/03/21 2200    oxyCODONE IR (ROXICODONE) tablet 5 mg, 5 mg, Oral, Q4H PRN, Nancy Paez PA-C, 5 mg at 02/03/21 2354    traMADoL (ULTRAM) tablet 50 mg, 50 mg, Oral, Q6H PRN, Nancy Paez PA-C    LABS:  Recent Results (from the past 24 hour(s))   GLUCOSE, POC    Collection Time: 02/04/21  7:44 PM   Result Value Ref Range    Glucose (POC) 168 (H) 65 - 100 mg/dL    Performed by Lurena Osgood    GLUCOSE, POC    Collection Time: 02/05/21  8:02 AM   Result Value Ref Range    Glucose (POC) 170 (H) 65 - 100 mg/dL    Performed by Jordin [facility],     Collection Time: 02/05/21 10:28 AM   Result Value Ref Range    Left CCA dist sys 46.6 cm/s    Left CCA dist [place] 8.8 cm/s    Left CCA prox sys 119.0 cm/s    Left CCA prox [place] 12.2 cm/s    Left ICA dist sys 79.7 cm/s    Left ICA dist [place] 21.0 cm/s    Left ICA prox sys 49.6 cm/s    Left ICA prox [place] 15.3 cm/s    Left ECA sys 53.8 cm/s    LEFT EXTERNAL CAROTID ARTERY D 8.78 cm/s    Left subclavian sys 40.2 cm/s    LEFT SUBCLAVIAN ARTERY D 0.00 cm/s    Left vertebral sys 52.3 cm/s    LEFT VERTEBRAL ARTERY D 14.10 cm/s    Right cca dist sys 45.7 cm/s    Right CCA dist [place] 7.6 cm/s    Right CCA prox sys 79.7 cm/s    Right CCA prox [place] 10.5 cm/s    Right ICA dist sys 44.3 cm/s    Right ICA dist [place] 9.9 cm/s    Right ICA prox sys 62.7 cm/s    Right ICA prox [place] 15.9 cm/s    Right eca sys 82.5 cm/s    RIGHT EXTERNAL CAROTID ARTERY D 10.70 cm/s    Right subclavian sys 52.5 cm/s    RIGHT SUBCLAVIAN ARTERY D 0.00 cm/s    Right vertebral sys 59.7 cm/s    RIGHT VERTEBRAL ARTERY D 10.30 cm/s   GLUCOSE, POC    Collection Time: 02/05/21 12:30 PM   Result Value Ref Range    Glucose (POC) 154 (H) 65 - 100 mg/dL    Performed by [facility]    ECHO ADULT COMPLETE    Collection Time: 02/05/21 12:42 PM   Result Value Ref Range    Aortic Regurgitant Pressure Half-time 593.00 ms    AR Max Alon 311.00 cm/s    Pulmonic Regurgitant End Max Velocity 165.00 cm/s    AoV PG 11.00 mmHg    Ao Root D 3.40 cm    IVSd 1.19 (A) 0.6 - 1.0 cm    LVIDd 4.37 4.2 - 5.9 cm    LVIDs 2.08 cm    Pulmonic Regurgitant End Max Velocity 114.00 cm/s    LVOT Peak Gradient 5.00 mmHg    LVPWd 1.25 (A) 0.6 - 1.0 cm    LV E' Septal Velocity 6.73 cm/s    LV ED Vol A2C 83.50 cm3    BP EF 89.2 55 - 100 %    LV ES Vol A2C 9.00 cm3    E/E' lateral 30.20     E/E' septal 13.10     LV Ejection Fraction MOD 2C 52 %    Left Atrium Major Axis 4.90 cm    Left Atrium to Aortic Root Ratio 1.44     Pulmonic Regurgitant End Max Velocity 503.00 cm/s    MR Peak Gradient 101.00 mmHg    Mitral Valve Deceleration Burke 6,560.00 mm/s2    Mitral Valve Deceleration Burke 6,560.00 mm/s2    Mitral Valve E Wave Deceleration Time 197.00 ms    Mitral Valve Pressure Half-time 54.00 ms    MV A Alon 50.00 cm/s    MV E Alon 88.20 cm/s    MVA (PHT) 4.07 cm2    MV E/A 1.80     Pulmonic Regurgitant End Max Velocity 113.00 cm/s    Pulmonic Valve Systolic Peak Instantaneous Gradient 5.00 mmHg    Pulmonic Regurgitant End Max Velocity 126.00 cm/s    Pulmonic Valve Systolic Peak Instantaneous Gradient 6.00 mmHg    Est. RA Pressure 3.00 mmHg RVIDd 3.89 cm    RVSP 41.00 mmHg    Tricuspid Valve Max Velocity 309.00 cm/s    Triscuspid Valve Regurgitation Peak Gradient 38.00 mmHg    Right Atrial Area 4C 21.95 cm2    LA Area 4C 15.26 cm2       Visit Vitals  BP (!) 141/84   Pulse (!) 103   Temp 98.1 °F (36.7 °C)   Resp 18   Ht 6' 1\" (1.854 m)   Wt 95.3 kg (210 lb)   SpO2 95%   BMI 27.71 kg/m²       Imaging:  CT PELV WO CONT   Final Result   No pelvic fracture. No fracture or dislocation either hip. Abdominal aorta and pelvic arteries atherosclerosis. Prostate enlargement. XR HIP RT W OR WO PELV 2-3 VWS   Final Result      XR CHEST SNGL V   Final Result      CT SPINE LUMB WO CONT   Final Result   No CT evidence for lumbar vertebral body compression deformity,   retropulsed bone, or malalignment. Noted advanced abdominal aorta atherosclerosis. With any persistent clinical concern for radicular-type signs or symptoms, MRI   of the lumbar spine may be warranted. CT HEAD WO CONT   Final Result      1. No acute intracranial abnormality      2. Stable underlying white matter changes, compatible with moderate to severe   chronic microvascular disease. 3. Ventricles appear slightly prominent for the degree of cerebral volume loss,   which may represent central volume loss. Normal pressure hydrocephalus may have   overlapping CT appearance and correlation with physical examination is   recommended.

## 2021-02-05 NOTE — PROGRESS NOTES
Reason for Admission:   Fall                   RUR Score:    11                 Plan for utilizing home health:      Patient is open to having [facility] at home, but does not know the companies name. PCP: First and Last name:  Cora Mix    Name of Practice: [facility]   Are you a current patient: Yes/No: yes   Approximate date of last visit:    Can you participate in a virtual visit with your PCP:                     Current Advanced Directive/Advance Care Plan:     Jesus Smallwood [phone]                         Transition of Care Plan:                    Patient uses [employer]ator. Daughter does grocery shopping. Patient has a [email or web address] service that comes and cleans for them. Wife does the cooking. Patient originally agreed with SNF for short term rehab prior to going home, then changed his mind and refused SNF and wants to be discharged home with home health. CM called Rahul Humphrey [phone] to discuss discharge plan, no answer to telephone, generic voicemail greeting present, vague voicemail left by CM requesting return telephone call. Discharge plan: Patient wants to go home with home health. CM has not discussed plan with family to get prior [facility] name. CM team will continue to follow for disposition.

## 2021-02-06 LAB
ALBUMIN SERPL-MCNC: 2.6 G/DL (ref 3.5–5)
ALBUMIN/GLOB SERPL: 0.6 {RATIO} (ref 1.1–2.2)
ALP SERPL-CCNC: 94 U/L (ref 45–117)
ALT SERPL-CCNC: 20 U/L (ref 12–78)
ANION GAP SERPL CALC-SCNC: 4 MMOL/L (ref 5–15)
AST SERPL W P-5'-P-CCNC: 14 U/L (ref 15–37)
BASOPHILS # BLD: 0 K/UL (ref 0–0.1)
BASOPHILS NFR BLD: 0 % (ref 0–1)
BILIRUB SERPL-MCNC: 0.7 MG/DL (ref 0.2–1)
BUN SERPL-MCNC: 27 MG/DL (ref 6–20)
BUN/CREAT SERPL: 21 (ref 12–20)
CA-I BLD-MCNC: 8.8 MG/DL (ref 8.5–10.1)
CHLORIDE SERPL-SCNC: 104 MMOL/L (ref 97–108)
CHOLEST SERPL-MCNC: 185 MG/DL
CO2 SERPL-SCNC: 30 MMOL/L (ref 21–32)
CREAT SERPL-MCNC: 1.28 MG/DL (ref 0.7–1.3)
DIFFERENTIAL METHOD BLD: ABNORMAL
EOSINOPHIL # BLD: 0 K/UL (ref 0–0.4)
EOSINOPHIL NFR BLD: 0 % (ref 0–7)
ERYTHROCYTE [DISTWIDTH] IN BLOOD BY AUTOMATED COUNT: 14.4 % (ref 11.5–14.5)
EST. AVERAGE GLUCOSE BLD GHB EST-MCNC: 100 MG/DL
GLOBULIN SER CALC-MCNC: 4.6 G/DL (ref 2–4)
GLUCOSE BLD STRIP.AUTO-MCNC: 149 MG/DL (ref 65–100)
GLUCOSE BLD STRIP.AUTO-MCNC: 164 MG/DL (ref 65–100)
GLUCOSE BLD STRIP.AUTO-MCNC: 204 MG/DL (ref 65–100)
GLUCOSE BLD STRIP.AUTO-MCNC: 221 MG/DL (ref 65–100)
GLUCOSE SERPL-MCNC: 168 MG/DL (ref 65–100)
HBA1C MFR BLD: 5.1 % (ref 4–5.6)
HCT VFR BLD AUTO: 38.4 % (ref 36.6–50.3)
HDLC SERPL-MCNC: 40 MG/DL
HDLC SERPL: 4.6 {RATIO} (ref 0–5)
HGB BLD-MCNC: 13.4 G/DL (ref 12.1–17)
IMM GRANULOCYTES # BLD AUTO: 0 K/UL (ref 0–0.04)
IMM GRANULOCYTES NFR BLD AUTO: 0 % (ref 0–0.5)
LDLC SERPL CALC-MCNC: 126.2 MG/DL (ref 0–100)
LIPID PROFILE,FLP: ABNORMAL
LYMPHOCYTES # BLD: 0.8 K/UL (ref 0.8–3.5)
LYMPHOCYTES NFR BLD: 9 % (ref 12–49)
MCH RBC QN AUTO: 44.4 PG (ref 26–34)
MCHC RBC AUTO-ENTMCNC: 34.9 G/DL (ref 30–36.5)
MCV RBC AUTO: 127.2 FL (ref 80–99)
MONOCYTES # BLD: 0.5 K/UL (ref 0–1)
MONOCYTES NFR BLD: 5 % (ref 5–13)
NEUTS SEG # BLD: 7.6 K/UL (ref 1.8–8)
NEUTS SEG NFR BLD: 86 % (ref 32–75)
PERFORMED BY, TECHID: ABNORMAL
PLATELET # BLD AUTO: 201 K/UL (ref 150–400)
PMV BLD AUTO: 10.1 FL (ref 8.9–12.9)
POTASSIUM SERPL-SCNC: 3.6 MMOL/L (ref 3.5–5.1)
PROT SERPL-MCNC: 7.2 G/DL (ref 6.4–8.2)
RBC # BLD AUTO: 3.02 M/UL (ref 4.1–5.7)
SODIUM SERPL-SCNC: 138 MMOL/L (ref 136–145)
TRIGL SERPL-MCNC: 94 MG/DL (ref ?–150)
VLDLC SERPL CALC-MCNC: 18.8 MG/DL
WBC # BLD AUTO: 8.9 K/UL (ref 4.1–11.1)

## 2021-02-06 PROCEDURE — 36415 COLL VENOUS BLD VENIPUNCTURE: CPT

## 2021-02-06 PROCEDURE — 74011250637 HC RX REV CODE- 250/637: Performed by: INTERNAL MEDICINE

## 2021-02-06 PROCEDURE — 74011250637 HC RX REV CODE- 250/637: Performed by: PHYSICIAN ASSISTANT

## 2021-02-06 PROCEDURE — 92610 EVALUATE SWALLOWING FUNCTION: CPT

## 2021-02-06 PROCEDURE — 74011250636 HC RX REV CODE- 250/636: Performed by: PHYSICIAN ASSISTANT

## 2021-02-06 PROCEDURE — 85025 COMPLETE CBC W/AUTO DIFF WBC: CPT

## 2021-02-06 PROCEDURE — 74011636637 HC RX REV CODE- 636/637: Performed by: PHYSICIAN ASSISTANT

## 2021-02-06 PROCEDURE — 82962 GLUCOSE BLOOD TEST: CPT

## 2021-02-06 PROCEDURE — 80053 COMPREHEN METABOLIC PANEL: CPT

## 2021-02-06 PROCEDURE — 65270000029 HC RM PRIVATE

## 2021-02-06 RX ADMIN — OXYBUTYNIN CHLORIDE 5 MG: 5 TABLET ORAL at 09:04

## 2021-02-06 RX ADMIN — ASPIRIN 325 MG ORAL TABLET 325 MG: 325 PILL ORAL at 09:04

## 2021-02-06 RX ADMIN — OXYBUTYNIN CHLORIDE 5 MG: 5 TABLET ORAL at 16:00

## 2021-02-06 RX ADMIN — INSULIN LISPRO 3 UNITS: 100 INJECTION, SOLUTION INTRAVENOUS; SUBCUTANEOUS at 16:30

## 2021-02-06 RX ADMIN — HYDROXYUREA 500 MG: 500 CAPSULE ORAL at 20:27

## 2021-02-06 RX ADMIN — TAMSULOSIN HYDROCHLORIDE 0.4 MG: 0.4 CAPSULE ORAL at 09:04

## 2021-02-06 RX ADMIN — ATORVASTATIN CALCIUM 40 MG: 40 TABLET, FILM COATED ORAL at 09:03

## 2021-02-06 RX ADMIN — DILTIAZEM HYDROCHLORIDE 240 MG: 120 CAPSULE, COATED, EXTENDED RELEASE ORAL at 09:03

## 2021-02-06 RX ADMIN — LEVOTHYROXINE SODIUM 50 MCG: 0.03 TABLET ORAL at 05:57

## 2021-02-06 RX ADMIN — INSULIN LISPRO 3 UNITS: 100 INJECTION, SOLUTION INTRAVENOUS; SUBCUTANEOUS at 11:30

## 2021-02-06 RX ADMIN — OXYBUTYNIN CHLORIDE 5 MG: 5 TABLET ORAL at 20:27

## 2021-02-06 RX ADMIN — HYDROXYUREA 500 MG: 500 CAPSULE ORAL at 09:03

## 2021-02-06 NOTE — PROGRESS NOTES
SPEECH LANGUAGE PATHOLOGY BEDSIDE SWALLOW EVALUATIONS  Patient: Ramon Hong (10 y.o. male)  Date: 2/6/2021  Primary Diagnosis: Fall [W19. XXXA]       Precautions:     ASSESSMENT :  Based on the objective data described below, the patient presents with largely [facility] swallow function. Oral phase c/b timely and efficient swallow. Patient with minimal oral residue following solid food trials, which is cleared with liquid wash. HLE adequate to palpation. Patient tolerated thin liquid, puree, mech soft, and solid food with no overt s/s of pen/aspiration. Patient and his wife reported no concerns regarding swallowing. PLAN :  Recommendations and Planned Interventions:  Recommend continue regular, thin diet. Adhere to aspiration precautions    Frequency/Duration: ST not warranted at this time. Please re-consult if medically indicated. Discharge Recommendations:  To Be Determined     SUBJECTIVE:   Patient is agreeable to beginning eval. Patient's wife present during eval, with patient consent    OBJECTIVE:     Past Medical History:   Diagnosis Date    Atrial fibrillation ([facility] [facility]     Diabetes mellitus type 2, uncomplicated ([facility] [address] [facility]     Hyperlipidemia     Hypertension     Hypothyroidism     Polycythemia vera ([facility] [facility]     Prostatic hyperplasia     Spinal stenosis     Transient ischemic attack      Past Surgical History:   Procedure Laterality Date    HX OTHER SURGICAL      None     Prior Level of Function/Home Situation:   Home Situation  Home Environment: Private residence  # Steps to Enter: 0  One/Two Story Residence: One story  Living Alone: No  Support Systems: Spouse/Significant Other/Partner  Patient Expects to be Discharged to[de-identified] Private residence  Current DME Used/Available at Home: None  Diet prior to admission: regular, thin  Current Diet: regular, thin    Cognitive and Communication Status:  Neurologic State: Alert  Orientation Level: Oriented to person, Disoriented to time, Disoriented to situation, Disoriented to place  Cognition: Follows commands    Swallowing Evaluation:   Oral Assessment:  Oral Assessment  Labial: No impairment  Dentition: Upper dentures  Oral Hygiene: (Adequate)  Lingual: No impairment     P.O. Trials:  Patient Position: (HOB raised)  Vocal quality prior to P.O.: No impairment  Consistency Presented: Thin liquid; Solid;Puree;Mechanical soft  How Presented: Self-fed/presented;SLP-fed/presented;Straw;Successive swallows;Spoon  How Much: (Multiple presentations)  Bolus Acceptance: No impairment  Bolus Formation/Control: No impairment     Propulsion: No impairment  Oral Residue: Less than 10% of bolus  Initiation of Swallow: No impairment  Laryngeal Elevation: Functional  Aspiration Signs/Symptoms: None  Pharyngeal Phase Characteristics: No impairment, issues, or problems   Oral Phase Severity: No impairment  Pharyngeal Phase Severity : No impairment     Voice:  Vocal Quality: No impairment    Pain: 0    After treatment:   Patient left in no apparent distress in bed, Call bell within reach, Nursing notified and Caregiver / family present    COMMUNICATION/EDUCATION:   Patient was educated regarding purpose of SLP assessment, POC, diet recs and sw safety precautions. Patient demonstrated Darren Mendes understanding as evidenced by verbal agreement. The patient's plan of care including recommendations, planned interventions, and recommended diet changes were discussed with: Registered nurse and Physician.      Thank you for this referral.  Gunner Lugo M.S. CCC-SLP  Time Calculation: 18 mins

## 2021-02-06 NOTE — PROCEDURES
[facility]  EEG    Name:  Alesia Rodríguez  MR#:  [id number]  :  1934  ACCOUNT #:  [de-identified]  DATE OF SERVICE:  2021      DIAGNOSIS:  Possible stroke. DESCRIPTION:  Recording is done digitally on computer. Electrodes are placed in a 10-20 international system. Initial coordinates and equipment calibration followed with biocalibration. Initial montages are bipolar montage. Tracings started with the patient awake, eyes closed, well-formed bioccipital alpha rhythms in the 8 Hz frequency. As the recording continues, the patient is hooked up to an EKG machine that shows a heart rate of 138. Tracings continued with the patient being exposed to photic stimulation without any abnormality. Hyperventilation is not done. IMPRESSION:  This is a normal EEG, awake.       Sylvia Nicole MD      [id number]  D:  2021 12:11  T:  2021 23:06  JOB #:  [id number]

## 2021-02-06 NOTE — PROGRESS NOTES
Bedside shift change report given to Kuldeep Benson RN (oncoming nurse) by Alessandra Atkinson LPN (offgoing nurse). Report included the following information SBAR and Intake/Output.

## 2021-02-06 NOTE — PROGRESS NOTES
Hospitalist Progress Note    Subjective:   Daily Progress Note: 2/6/2021     Zeenat Goncalves is a 80 y.o. male with history of dementia, type 2 diabetes, hypertension, spinal stenosis, hyperlipidemia, and atrial fibrillation who presented to the ED with right hip pain after a fall 2 days prior. He reports he tried to stand up out of a chair when he fell backwards mostly onto his right side/buttocks. He was able to ambulate with pain 2 days ago and it has progressively become worse. He is unable to ambulate secondary to pain. Head CT shows stable white matter changes with chronic microvascular disease. Pelvic CT negative for acute fracture or dislocation. 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 Lumbar spine CT shows abdominal aorta atherosclerosis, no evidence of vertebral fracture. Ortho consulted. Patient became more confused today and refuses MRI. Patient examined at bedside. He is confused and not cooperative.      Current Facility-Administered Medications   Medication Dose Route Frequency    dilTIAZem ER (CARDIZEM CD) capsule 240 mg  240 mg Oral DAILY    atorvastatin (LIPITOR) tablet 40 mg  40 mg Oral DAILY    aspirin tablet 325 mg  325 mg Oral DAILY    dabigatran etexilate (PRADAXA) capsule 75 mg  75 mg Oral Q12H    hydrOXYzine (VISTARIL) injection 25 mg  25 mg IntraMUSCular Q6H PRN    sodium chloride (NS) flush 5-40 mL  5-40 mL IntraVENous PRN    acetaminophen (TYLENOL) tablet 650 mg  650 mg Oral Q6H PRN    Or    acetaminophen (TYLENOL) suppository 650 mg  650 mg Rectal Q6H PRN    polyethylene glycol (MIRALAX) packet 17 g  17 g Oral DAILY PRN    promethazine (PHENERGAN) tablet 12.5 mg  12.5 mg Oral Q6H PRN    Or    ondansetron (ZOFRAN) injection 4 mg  4 mg IntraVENous Q6H PRN    hydroxyurea (HYDREA) chemo cap 500 mg  500 mg Oral BID    levothyroxine (SYNTHROID) tablet 50 mcg  50 mcg Oral ACB    tamsulosin (FLOMAX) capsule 0.4 mg  0.4 mg Oral DAILY    oxybutynin (DITROPAN) tablet 5 mg 5 mg Oral TID    glucose chewable tablet 16 g  4 Tab Oral PRN    dextrose (D50W) injection syrg 12.5-25 g  25-50 mL IntraVENous PRN    glucagon (GLUCAGEN) injection 1 mg  1 mg IntraMUSCular PRN    insulin lispro (HUMALOG) injection   SubCUTAneous AC&HS    oxyCODONE IR (ROXICODONE) tablet 5 mg  5 mg Oral Q4H PRN    traMADoL (ULTRAM) tablet 50 mg  50 mg Oral Q6H PRN        Review of Systems  Review of Systems   Unable to perform ROS: Dementia   Constitutional: Negative. HENT: Negative. Eyes: Negative. Respiratory: Negative for cough and shortness of breath. Cardiovascular: Negative for chest pain and leg swelling. Gastrointestinal: Negative for abdominal pain, nausea and vomiting. Genitourinary: Negative. Musculoskeletal: Negative. Skin: Negative. Neurological: Positive for weakness and headaches. Psychiatric/Behavioral: Negative. Objective:     Visit Vitals  /75 (BP 1 Location: Left upper arm, BP Patient Position: At rest)   Pulse (!) 109   Temp 98 °F (36.7 °C)   Resp 18   Ht 6' 1\" (1.854 m)   Wt 95.3 kg (210 lb)   SpO2 97%   BMI 27.71 kg/m²      O2 Device: Room air    Temp (24hrs), Av.2 °F (36.8 °C), Min:97.3 °F (36.3 °C), Max:99 °F (37.2 °C)      No intake/output data recorded.    1901 -  0700  In: 150 [P.O.:150]  Out: -     Recent Results (from the past 24 hour(s))   ECHO ADULT COMPLETE    Collection Time: 21 12:15 PM   Result Value Ref Range    Aortic Regurgitant Pressure Half-time 593.00 ms    AR Max Alon 311.00 cm/s    Pulmonic Regurgitant End Max Velocity 165.00 cm/s    AoV PG 11.00 mmHg    Ao Root D 3.40 cm    IVSd 1.19 (A) 0.6 - 1.0 cm    LVIDd 4.37 4.2 - 5.9 cm    LVIDs 2.08 cm    Pulmonic Regurgitant End Max Velocity 114.00 cm/s    LVOT Peak Gradient 5.00 mmHg    LVPWd 1.25 (A) 0.6 - 1.0 cm    LV E' Septal Velocity 6.73 cm/s    LV ED Vol A2C 83.50 cm3    BP EF 79.0 55 - 100 %    LV ES Vol A2C 9.00 cm3    E/E' septal 13.11     Left Atrium Major Axis 4.90 cm    Left Atrium to Aortic Root Ratio 1.44     Pulmonic Regurgitant End Max Velocity 503.00 cm/s    Mitral Valve Deceleration [place] 6,560.00 mm/s2    Mitral Valve Deceleration [place] 6,560.00 mm/s2    Mitral Valve E Wave Deceleration Time 197.00 ms    Mitral Valve Pressure Half-time 54.00 ms    MV A Alon 50.00 cm/s    MV E Alon 88.20 cm/s    MVA (PHT) 4.07 cm2    MV E/A 1.76     Pulmonic Regurgitant End Max Velocity 113.00 cm/s    Pulmonic Valve Systolic Peak Instantaneous Gradient 5.00 mmHg    Pulmonic Regurgitant End Max Velocity 126.00 cm/s    Pulmonic Valve Systolic Peak Instantaneous Gradient 6.00 mmHg    Est. RA Pressure 3.00 mmHg    RVIDd 3.89 cm    RVSP 41.00 mmHg    Tricuspid Valve Max Velocity 309.00 cm/s    Triscuspid Valve Regurgitation Peak Gradient 38.00 mmHg    Right Atrial Area 4C 21.95 cm2    LA Area 4C 15.26 cm2    LV Mass .9 88 - 224 g    LV Mass AL Index 88.3 49 - 115 g/m2    Left Atrium Minor Axis 2.23 cm   GLUCOSE, POC    Collection Time: 02/05/21 12:30 PM   Result Value Ref Range    Glucose (POC) 154 (H) 65 - 100 mg/dL    Performed by AREN THOMAS    CBC WITH AUTOMATED DIFF    Collection Time: 02/05/21  1:37 PM   Result Value Ref Range    WBC 8.7 4.1 - 11.1 K/uL    RBC 3.26 (L) 4.10 - 5.70 M/uL    HGB 14.3 12.1 - 17.0 g/dL    HCT 41.7 36.6 - 50.3 %    .9 (H) 80.0 - 99.0 FL    MCH 43.9 (H) 26.0 - 34.0 PG    MCHC 34.3 30.0 - 36.5 g/dL    RDW 14.7 (H) 11.5 - 14.5 %    PLATELET 249 794 - 869 K/uL    MPV 10.3 8.9 - 12.9 FL    NRBC 0.0 0  WBC    ABSOLUTE NRBC 0.00 0.00 - 0.01 K/uL    NEUTROPHILS 85 (H) 32 - 75 %    LYMPHOCYTES 8 (L) 12 - 49 %    MONOCYTES 7 5 - 13 %    EOSINOPHILS 0 0 - 7 %    BASOPHILS 0 0 - 1 %    IMMATURE GRANULOCYTES 0 0.0 - 0.5 %    ABS. NEUTROPHILS 7.3 1.8 - 8.0 K/UL    ABS. LYMPHOCYTES 0.7 (L) 0.8 - 3.5 K/UL    ABS. MONOCYTES 0.6 0.0 - 1.0 K/UL    ABS. EOSINOPHILS 0.0 0.0 - 0.4 K/UL    ABS. BASOPHILS 0.0 0.0 - 0.1 K/UL    ABS. IMM.  GRANS. 0.0 0.00 - 0.04 K/UL    DF AUTOMATED     METABOLIC PANEL, COMPREHENSIVE    Collection Time: 02/05/21  1:37 PM   Result Value Ref Range    Sodium 136 136 - 145 mmol/L    Potassium 3.5 3.5 - 5.1 mmol/L    Chloride 102 97 - 108 mmol/L    CO2 27 21 - 32 mmol/L    Anion gap 7 5 - 15 mmol/L    Glucose 176 (H) 65 - 100 mg/dL    BUN 22 (H) 6 - 20 mg/dL    Creatinine 1.12 0.70 - 1.30 mg/dL    BUN/Creatinine ratio 20 12 - 20      GFR est AA >60 >60 ml/min/1.73m2    GFR est non-AA >60 >60 ml/min/1.73m2    Calcium 8.9 8.5 - 10.1 mg/dL    Bilirubin, total 0.7 0.2 - 1.0 mg/dL    AST (SGOT) 10 (L) 15 - 37 U/L    ALT (SGPT) 13 12 - 78 U/L    Alk. phosphatase 98 45 - 117 U/L    Protein, total 7.2 6.4 - 8.2 g/dL    Albumin 2.7 (L) 3.5 - 5.0 g/dL    Globulin 4.5 (H) 2.0 - 4.0 g/dL    A-G Ratio 0.6 (L) 1.1 - 2.2     GLUCOSE, POC    Collection Time: 02/05/21  3:32 PM   Result Value Ref Range    Glucose (POC) 157 (H) 65 - 100 mg/dL    Performed by Brock Richmond, POC    Collection Time: 02/05/21  8:39 PM   Result Value Ref Range    Glucose (POC) 133 (H) 65 - 100 mg/dL    Performed by [address] [place] [address], POC    Collection Time: 02/06/21  9:12 AM   Result Value Ref Range    Glucose (POC) 149 (H) 65 - 100 mg/dL    Performed by Yarely Mt         CT PELV WO CONT   Final Result   No pelvic fracture. No fracture or dislocation either hip. Abdominal aorta and pelvic arteries atherosclerosis. Prostate enlargement. XR HIP RT W OR WO PELV 2-3 VWS   Final Result      XR CHEST SNGL V   Final Result      CT SPINE LUMB WO CONT   Final Result   No CT evidence for lumbar vertebral body compression deformity,   retropulsed bone, or malalignment. Noted advanced abdominal aorta atherosclerosis. With any persistent clinical concern for radicular-type signs or symptoms, MRI   of the lumbar spine may be warranted. CT HEAD WO CONT   Final Result      1. No acute intracranial abnormality      2.  Stable underlying white matter changes, compatible with moderate to severe   chronic microvascular disease. 3. Ventricles appear slightly prominent for the degree of cerebral volume loss,   which may represent central volume loss. Normal pressure hydrocephalus may have   overlapping CT appearance and correlation with physical examination is   recommended. PHYSICAL EXAM:    Physical Exam  Constitutional:       General: He is not in acute distress. Appearance: Normal appearance. He is normal weight. HENT:      Head: Normocephalic and atraumatic. Right Ear: External ear normal.      Left Ear: External ear normal.      Nose: Nose normal.      Mouth/Throat:      Mouth: Mucous membranes are dry. Pharynx: Oropharynx is clear. Cardiovascular:      Rate and Rhythm: Normal rate. Rhythm irregular. Heart sounds: Normal heart sounds. Pulmonary:      Effort: Pulmonary effort is normal.      Breath sounds: Normal breath sounds. Musculoskeletal:         General: No tenderness or signs of injury. Left lower leg: No edema. Skin:     Findings: Bruising: lower extremities have mild bruising. Neurological:      General: No focal deficit present. Mental Status: He is alert. He is disoriented. Motor: Weakness (LE bilaterally) present.           Data Review    Recent Results (from the past 24 hour(s))   ECHO ADULT COMPLETE    Collection Time: 02/05/21 12:15 PM   Result Value Ref Range    Aortic Regurgitant Pressure Half-time 593.00 ms    AR Max Alon 311.00 cm/s    Pulmonic Regurgitant End Max Velocity 165.00 cm/s    AoV PG 11.00 mmHg    Ao Root D 3.40 cm    IVSd 1.19 (A) 0.6 - 1.0 cm    LVIDd 4.37 4.2 - 5.9 cm    LVIDs 2.08 cm    Pulmonic Regurgitant End Max Velocity 114.00 cm/s    LVOT Peak Gradient 5.00 mmHg    LVPWd 1.25 (A) 0.6 - 1.0 cm    LV E' Septal Velocity 6.73 cm/s    LV ED Vol A2C 83.50 cm3    BP EF 79.0 55 - 100 %    LV ES Vol A2C 9.00 cm3    E/E' septal 13.11     Left Atrium Major Axis 4.90 cm    Left Atrium to Aortic Root Ratio 1.44     Pulmonic Regurgitant End Max Velocity 503.00 cm/s    Mitral Valve Deceleration [place] 6,560.00 mm/s2    Mitral Valve Deceleration [place] 6,560.00 mm/s2    Mitral Valve E Wave Deceleration Time 197.00 ms    Mitral Valve Pressure Half-time 54.00 ms    MV A Alon 50.00 cm/s    MV E Alon 88.20 cm/s    MVA (PHT) 4.07 cm2    MV E/A 1.76     Pulmonic Regurgitant End Max Velocity 113.00 cm/s    Pulmonic Valve Systolic Peak Instantaneous Gradient 5.00 mmHg    Pulmonic Regurgitant End Max Velocity 126.00 cm/s    Pulmonic Valve Systolic Peak Instantaneous Gradient 6.00 mmHg    Est. RA Pressure 3.00 mmHg    RVIDd 3.89 cm    RVSP 41.00 mmHg    Tricuspid Valve Max Velocity 309.00 cm/s    Triscuspid Valve Regurgitation Peak Gradient 38.00 mmHg    Right Atrial Area 4C 21.95 cm2    LA Area 4C 15.26 cm2    LV Mass .9 88 - 224 g    LV Mass AL Index 88.3 49 - 115 g/m2    Left Atrium Minor Axis 2.23 cm   GLUCOSE, POC    Collection Time: 02/05/21 12:30 PM   Result Value Ref Range    Glucose (POC) 154 (H) 65 - 100 mg/dL    Performed by AERN THOMAS    CBC WITH AUTOMATED DIFF    Collection Time: 02/05/21  1:37 PM   Result Value Ref Range    WBC 8.7 4.1 - 11.1 K/uL    RBC 3.26 (L) 4.10 - 5.70 M/uL    HGB 14.3 12.1 - 17.0 g/dL    HCT 41.7 36.6 - 50.3 %    .9 (H) 80.0 - 99.0 FL    MCH 43.9 (H) 26.0 - 34.0 PG    MCHC 34.3 30.0 - 36.5 g/dL    RDW 14.7 (H) 11.5 - 14.5 %    PLATELET 620 430 - 266 K/uL    MPV 10.3 8.9 - 12.9 FL    NRBC 0.0 0  WBC    ABSOLUTE NRBC 0.00 0.00 - 0.01 K/uL    NEUTROPHILS 85 (H) 32 - 75 %    LYMPHOCYTES 8 (L) 12 - 49 %    MONOCYTES 7 5 - 13 %    EOSINOPHILS 0 0 - 7 %    BASOPHILS 0 0 - 1 %    IMMATURE GRANULOCYTES 0 0.0 - 0.5 %    ABS. NEUTROPHILS 7.3 1.8 - 8.0 K/UL    ABS. LYMPHOCYTES 0.7 (L) 0.8 - 3.5 K/UL    ABS. MONOCYTES 0.6 0.0 - 1.0 K/UL    ABS. EOSINOPHILS 0.0 0.0 - 0.4 K/UL    ABS. BASOPHILS 0.0 0.0 - 0.1 K/UL    ABS. IMM.  GRANS. 0.0 0.00 - 0.04 K/UL    DF AUTOMATED     METABOLIC PANEL, COMPREHENSIVE    Collection Time: 02/05/21  1:37 PM   Result Value Ref Range    Sodium 136 136 - 145 mmol/L    Potassium 3.5 3.5 - 5.1 mmol/L    Chloride 102 97 - 108 mmol/L    CO2 27 21 - 32 mmol/L    Anion gap 7 5 - 15 mmol/L    Glucose 176 (H) 65 - 100 mg/dL    BUN 22 (H) 6 - 20 mg/dL    Creatinine 1.12 0.70 - 1.30 mg/dL    BUN/Creatinine ratio 20 12 - 20      GFR est AA >60 >60 ml/min/1.73m2    GFR est non-AA >60 >60 ml/min/1.73m2    Calcium 8.9 8.5 - 10.1 mg/dL    Bilirubin, total 0.7 0.2 - 1.0 mg/dL    AST (SGOT) 10 (L) 15 - 37 U/L    ALT (SGPT) 13 12 - 78 U/L    Alk. phosphatase 98 45 - 117 U/L    Protein, total 7.2 6.4 - 8.2 g/dL    Albumin 2.7 (L) 3.5 - 5.0 g/dL    Globulin 4.5 (H) 2.0 - 4.0 g/dL    A-G Ratio 0.6 (L) 1.1 - 2.2     GLUCOSE, POC    Collection Time: 02/05/21  3:32 PM   Result Value Ref Range    Glucose (POC) 157 (H) 65 - 100 mg/dL    Performed by Jenifer Romero    GLUCOSE, POC    Collection Time: 02/05/21  8:39 PM   Result Value Ref Range    Glucose (POC) 133 (H) 65 - 100 mg/dL    Performed by [address] [place] [address], POC    Collection Time: 02/06/21  9:12 AM   Result Value Ref Range    Glucose (POC) 149 (H) 65 - 100 mg/dL    Performed by Shun Gomez         Assessment/Plan:     1. 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 Orthopedic consult, no treatment necessary  MRI not indicated as he now has flexion and extension of the hip  Tramadol and oxycodone for pain  PT/OT evaluation appreciated  Re-evaluate for SNF/Rehab now that he is moving right eg     2. Ground level fall  UA negative for UTI  Troponin negative  Head CT shows stable white matter changes with chronic microvascular disease     3. Type II diabetes  SSI     4.  Hypertension  Metoprolol  Blood pressure well-controlled     5. Atrial fibrillation  Hold pradaxa for falls  Metoprolol and diltiazem     6. Hypothyroidism  Synthroid     7. History of TIA  Hold pradaxa  Stroke work up:  EKG  Carotid duplex mild carotid stenosis  Neurology consult, Dr. Garret Joy  ECHO EF 79%. Mild dilatation of the right ventricle and right atrium. Tricuspid valve regurgitation with right ventricular systolic pressure 41  Lipid panel     8. BPH  Tamsulosin and detrol     9. Polycythemia  Hydroxyurea  Hgb 13.2     DVT prophylaxis: SCD's  Ulcer prophylaxis: protonix     Code status: DNR     Update wife, Keo Hernandez, at [phone]    Reevaluate needs and possible placement per physical therapy and Occupational Therapy evaluation    Care Plan discussed with: Patient/Family, Nurse and     Total time spent with patient: 33 minutes.

## 2021-02-06 NOTE — PROGRESS NOTES
NEURO PROGRESS NOTE        SUBJECTIVE:   Recurrent falls  Right hip pain  Beclouded dementia  Other medical problem        EXAM:  Awake, slightly less lethargic,  Less verbigeration this morning  Follows one-step commands with less coaxing      ASSESSMENT/PLAN:  Current work-up and treatment continues  Patient may need placement eventually      ALLERGIES:    No Known Allergies    MEDS:      Current Facility-Administered Medications:     dilTIAZem ER (CARDIZEM CD) capsule 240 mg, 240 mg, Oral, DAILY, Edilia Alvares MD, 240 mg at 02/06/21 7045    atorvastatin (LIPITOR) tablet 40 mg, 40 mg, Oral, DAILY, Nirav Paez PA-C, 40 mg at 02/06/21 1418    aspirin tablet 325 mg, 325 mg, Oral, DAILY, Nirav Paez PA-C, 325 mg at 02/06/21 4679    dabigatran etexilate (PRADAXA) capsule 75 mg, 75 mg, Oral, Q12H, Nirav Paez PA-C, Stopped at 02/04/21 2100    hydrOXYzine (VISTARIL) injection 25 mg, 25 mg, IntraMUSCular, Q6H PRN, Sudhakar Paez PA-C, 25 mg at 02/04/21 1832    sodium chloride (NS) flush 5-40 mL, 5-40 mL, IntraVENous, PRN, Nirav Paez PA-C    acetaminophen (TYLENOL) tablet 650 mg, 650 mg, Oral, Q6H PRN **OR** acetaminophen (TYLENOL) suppository 650 mg, 650 mg, Rectal, Q6H PRN, Nirav Paez PA-C    polyethylene glycol (MIRALAX) packet 17 g, 17 g, Oral, DAILY PRN, Nirav Paez PA-C    promethazine (PHENERGAN) tablet 12.5 mg, 12.5 mg, Oral, Q6H PRN **OR** ondansetron (ZOFRAN) injection 4 mg, 4 mg, IntraVENous, Q6H PRN, Sudhakar Paez PA-C    hydroxyurea (HYDREA) chemo cap 500 mg, 500 mg, Oral, BID, Sudhakar Paez PA-C, 500 mg at 02/06/21 8783    levothyroxine (SYNTHROID) tablet 50 mcg, 50 mcg, Oral, ACB, Sudhakar Paez PA-C, 50 mcg at 02/06/21 0557    tamsulosin (FLOMAX) capsule 0.4 mg, 0.4 mg, Oral, DAILY, Sudhakar Paez PA-C, 0.4 mg at 02/06/21 4340    oxybutynin (DITROPAN) tablet 5 mg, 5 mg, Oral, TID, Sudhakar Paez PA-C, 5 mg at 02/06/21 0904    glucose chewable tablet 16 g, 4 Tab, Oral, PRN, Sudhakar Paez PA-C    dextrose (D50W) injection syrg 12.5-25 g, 25-50 mL, IntraVENous, PRN, Aline aPez PA-C    glucagon (GLUCAGEN) injection 1 mg, 1 mg, IntraMUSCular, PRN, Aline Paez PA-C    insulin lispro (HUMALOG) injection, , SubCUTAneous, AC&HS, Aline Paez PA-C, 3 Units at 02/06/21 1130    oxyCODONE IR (ROXICODONE) tablet 5 mg, 5 mg, Oral, Q4H PRN, Aline Paez PA-C, 5 mg at 02/03/21 2354    traMADoL (ULTRAM) tablet 50 mg, 50 mg, Oral, Q6H PRN, Aline Paez PA-C    LABS:  Recent Results (from the past 24 hour(s))   ECHO ADULT COMPLETE    Collection Time: 02/05/21 12:15 PM   Result Value Ref Range    Aortic Regurgitant Pressure Half-time 593.00 ms    AR Max Alon 311.00 cm/s    Pulmonic Regurgitant End Max Velocity 165.00 cm/s    AoV PG 11.00 mmHg    Ao Root D 3.40 cm    IVSd 1.19 (A) 0.6 - 1.0 cm    LVIDd 4.37 4.2 - 5.9 cm    LVIDs 2.08 cm    Pulmonic Regurgitant End Max Velocity 114.00 cm/s    LVOT Peak Gradient 5.00 mmHg    LVPWd 1.25 (A) 0.6 - 1.0 cm    LV E' Septal Velocity 6.73 cm/s    LV ED Vol A2C 83.50 cm3    BP EF 79.0 55 - 100 %    LV ES Vol A2C 9.00 cm3    E/E' septal 13.11     Left Atrium Major Axis 4.90 cm    Left Atrium to Aortic Root Ratio 1.44     Pulmonic Regurgitant End Max Velocity 503.00 cm/s    Mitral Valve Deceleration [place] 6,560.00 mm/s2    Mitral Valve Deceleration [place] 6,560.00 mm/s2    Mitral Valve E Wave Deceleration Time 197.00 ms    Mitral Valve Pressure Half-time 54.00 ms    MV A Alon 50.00 cm/s    MV E Alon 88.20 cm/s    MVA (PHT) 4.07 cm2    MV E/A 1.76     Pulmonic Regurgitant End Max Velocity 113.00 cm/s    Pulmonic Valve Systolic Peak Instantaneous Gradient 5.00 mmHg    Pulmonic Regurgitant End Max Velocity 126.00 cm/s    Pulmonic Valve Systolic Peak Instantaneous Gradient 6.00 mmHg    Est. RA Pressure 3.00 mmHg    RVIDd 3.89 cm    RVSP 41.00 mmHg    Tricuspid Valve Max Velocity 309.00 cm/s    Triscuspid Valve Regurgitation Peak Gradient 38.00 mmHg    Right Atrial Area 4C 21.95 cm2    LA Area 4C 15.26 cm2    LV Mass .9 88 - 224 g    LV Mass AL Index 88.3 49 - 115 g/m2    Left Atrium Minor Axis 2.23 cm   GLUCOSE, POC    Collection Time: 02/05/21 12:30 PM   Result Value Ref Range    Glucose (POC) 154 (H) 65 - 100 mg/dL    Performed by AREN THOMAS    CBC WITH AUTOMATED DIFF    Collection Time: 02/05/21  1:37 PM   Result Value Ref Range    WBC 8.7 4.1 - 11.1 K/uL    RBC 3.26 (L) 4.10 - 5.70 M/uL    HGB 14.3 12.1 - 17.0 g/dL    HCT 41.7 36.6 - 50.3 %    .9 (H) 80.0 - 99.0 FL    MCH 43.9 (H) 26.0 - 34.0 PG    MCHC 34.3 30.0 - 36.5 g/dL    RDW 14.7 (H) 11.5 - 14.5 %    PLATELET 451 692 - 571 K/uL    MPV 10.3 8.9 - 12.9 FL    NRBC 0.0 0  WBC    ABSOLUTE NRBC 0.00 0.00 - 0.01 K/uL    NEUTROPHILS 85 (H) 32 - 75 %    LYMPHOCYTES 8 (L) 12 - 49 %    MONOCYTES 7 5 - 13 %    EOSINOPHILS 0 0 - 7 %    BASOPHILS 0 0 - 1 %    IMMATURE GRANULOCYTES 0 0.0 - 0.5 %    ABS. NEUTROPHILS 7.3 1.8 - 8.0 K/UL    ABS. LYMPHOCYTES 0.7 (L) 0.8 - 3.5 K/UL    ABS. MONOCYTES 0.6 0.0 - 1.0 K/UL    ABS. EOSINOPHILS 0.0 0.0 - 0.4 K/UL    ABS. BASOPHILS 0.0 0.0 - 0.1 K/UL    ABS. IMM. GRANS. 0.0 0.00 - 0.04 K/UL    DF AUTOMATED     METABOLIC PANEL, COMPREHENSIVE    Collection Time: 02/05/21  1:37 PM   Result Value Ref Range    Sodium 136 136 - 145 mmol/L    Potassium 3.5 3.5 - 5.1 mmol/L    Chloride 102 97 - 108 mmol/L    CO2 27 21 - 32 mmol/L    Anion gap 7 5 - 15 mmol/L    Glucose 176 (H) 65 - 100 mg/dL    BUN 22 (H) 6 - 20 mg/dL    Creatinine 1.12 0.70 - 1.30 mg/dL    BUN/Creatinine ratio 20 12 - 20      GFR est AA >60 >60 ml/min/1.73m2    GFR est non-AA >60 >60 ml/min/1.73m2    Calcium 8.9 8.5 - 10.1 mg/dL    Bilirubin, total 0.7 0.2 - 1.0 mg/dL    AST (SGOT) 10 (L) 15 - 37 U/L    ALT (SGPT) 13 12 - 78 U/L    Alk.  phosphatase 98 45 - 117 U/L    Protein, total 7.2 6.4 - 8.2 g/dL Albumin 2.7 (L) 3.5 - 5.0 g/dL    Globulin 4.5 (H) 2.0 - 4.0 g/dL    A-G Ratio 0.6 (L) 1.1 - 2.2     GLUCOSE, POC    Collection Time: 02/05/21  3:32 PM   Result Value Ref Range    Glucose (POC) 157 (H) 65 - 100 mg/dL    Performed by [id number], POC    Collection Time: 02/05/21  8:39 PM   Result Value Ref Range    Glucose (POC) 133 (H) 65 - 100 mg/dL    Performed by [facility], POC    Collection Time: 02/06/21  9:12 AM   Result Value Ref Range    Glucose (POC) 149 (H) 65 - 100 mg/dL    Performed by Kyler, POC    Collection Time: 02/06/21 11:04 AM   Result Value Ref Range    Glucose (POC) 221 (H) 65 - 100 mg/dL    Performed by Michial Ormond Visit Vitals  /75 (BP 1 Location: Left upper arm, BP Patient Position: At rest)   Pulse (!) 109   Temp 98 °F (36.7 °C)   Resp 18   Ht 6' 1\" (1.854 m)   Wt 95.3 kg (210 lb)   SpO2 97%   BMI 27.71 kg/m²       Imaging:  CT PELV WO CONT   Final Result   No pelvic fracture. No fracture or dislocation either hip. Abdominal aorta and pelvic arteries atherosclerosis. Prostate enlargement. XR HIP RT W OR WO PELV 2-3 VWS   Final Result      XR CHEST SNGL V   Final Result      CT SPINE LUMB WO CONT   Final Result   No CT evidence for lumbar vertebral body compression deformity,   retropulsed bone, or malalignment. Noted advanced abdominal aorta atherosclerosis. With any persistent clinical concern for radicular-type signs or symptoms, MRI   of the lumbar spine may be warranted. CT HEAD WO CONT   Final Result      1. No acute intracranial abnormality      2. Stable underlying white matter changes, compatible with moderate to severe   chronic microvascular disease. 3. Ventricles appear slightly prominent for the degree of cerebral volume loss,   which may represent central volume loss.  Normal pressure hydrocephalus may have   overlapping CT appearance and correlation with physical examination is   recommended.

## 2021-02-07 LAB
ALBUMIN SERPL-MCNC: 2.5 G/DL (ref 3.5–5)
ALBUMIN/GLOB SERPL: 0.5 {RATIO} (ref 1.1–2.2)
ALP SERPL-CCNC: 99 U/L (ref 45–117)
ALT SERPL-CCNC: 23 U/L (ref 12–78)
ANION GAP SERPL CALC-SCNC: 6 MMOL/L (ref 5–15)
AST SERPL W P-5'-P-CCNC: 16 U/L (ref 15–37)
BASOPHILS # BLD: 0 K/UL (ref 0–0.1)
BASOPHILS NFR BLD: 0 % (ref 0–1)
BILIRUB SERPL-MCNC: 0.6 MG/DL (ref 0.2–1)
BUN SERPL-MCNC: 31 MG/DL (ref 6–20)
BUN/CREAT SERPL: 26 (ref 12–20)
CA-I BLD-MCNC: 9 MG/DL (ref 8.5–10.1)
CHLORIDE SERPL-SCNC: 104 MMOL/L (ref 97–108)
CO2 SERPL-SCNC: 29 MMOL/L (ref 21–32)
CREAT SERPL-MCNC: 1.21 MG/DL (ref 0.7–1.3)
DIFFERENTIAL METHOD BLD: ABNORMAL
EOSINOPHIL # BLD: 0 K/UL (ref 0–0.4)
EOSINOPHIL NFR BLD: 0 % (ref 0–7)
ERYTHROCYTE [DISTWIDTH] IN BLOOD BY AUTOMATED COUNT: 14.3 % (ref 11.5–14.5)
GLOBULIN SER CALC-MCNC: 4.6 G/DL (ref 2–4)
GLUCOSE BLD STRIP.AUTO-MCNC: 137 MG/DL (ref 65–100)
GLUCOSE BLD STRIP.AUTO-MCNC: 142 MG/DL (ref 65–100)
GLUCOSE BLD STRIP.AUTO-MCNC: 168 MG/DL (ref 65–100)
GLUCOSE BLD STRIP.AUTO-MCNC: 181 MG/DL (ref 65–100)
GLUCOSE SERPL-MCNC: 122 MG/DL (ref 65–100)
HCT VFR BLD AUTO: 37.1 % (ref 36.6–50.3)
HGB BLD-MCNC: 12.7 G/DL (ref 12.1–17)
IMM GRANULOCYTES # BLD AUTO: 0 K/UL (ref 0–0.04)
IMM GRANULOCYTES NFR BLD AUTO: 0 % (ref 0–0.5)
LYMPHOCYTES # BLD: 0.8 K/UL (ref 0.8–3.5)
LYMPHOCYTES NFR BLD: 10 % (ref 12–49)
MCH RBC QN AUTO: 43.3 PG (ref 26–34)
MCHC RBC AUTO-ENTMCNC: 34.2 G/DL (ref 30–36.5)
MCV RBC AUTO: 126.6 FL (ref 80–99)
MONOCYTES # BLD: 0.5 K/UL (ref 0–1)
MONOCYTES NFR BLD: 6 % (ref 5–13)
NEUTS SEG # BLD: 6.9 K/UL (ref 1.8–8)
NEUTS SEG NFR BLD: 84 % (ref 32–75)
PERFORMED BY, TECHID: ABNORMAL
PLATELET # BLD AUTO: 206 K/UL (ref 150–400)
PMV BLD AUTO: 10.5 FL (ref 8.9–12.9)
POTASSIUM SERPL-SCNC: 3.2 MMOL/L (ref 3.5–5.1)
PROT SERPL-MCNC: 7.1 G/DL (ref 6.4–8.2)
RBC # BLD AUTO: 2.93 M/UL (ref 4.1–5.7)
SODIUM SERPL-SCNC: 139 MMOL/L (ref 136–145)
WBC # BLD AUTO: 8.1 K/UL (ref 4.1–11.1)

## 2021-02-07 PROCEDURE — 85025 COMPLETE CBC W/AUTO DIFF WBC: CPT

## 2021-02-07 PROCEDURE — 74011250637 HC RX REV CODE- 250/637: Performed by: PHYSICIAN ASSISTANT

## 2021-02-07 PROCEDURE — 82962 GLUCOSE BLOOD TEST: CPT

## 2021-02-07 PROCEDURE — 74011250636 HC RX REV CODE- 250/636: Performed by: PHYSICIAN ASSISTANT

## 2021-02-07 PROCEDURE — 74011250637 HC RX REV CODE- 250/637: Performed by: INTERNAL MEDICINE

## 2021-02-07 PROCEDURE — 80053 COMPREHEN METABOLIC PANEL: CPT

## 2021-02-07 PROCEDURE — 74011636637 HC RX REV CODE- 636/637: Performed by: PHYSICIAN ASSISTANT

## 2021-02-07 PROCEDURE — 65270000029 HC RM PRIVATE

## 2021-02-07 PROCEDURE — 36415 COLL VENOUS BLD VENIPUNCTURE: CPT

## 2021-02-07 RX ADMIN — TAMSULOSIN HYDROCHLORIDE 0.4 MG: 0.4 CAPSULE ORAL at 08:44

## 2021-02-07 RX ADMIN — HYDROXYUREA 500 MG: 500 CAPSULE ORAL at 08:44

## 2021-02-07 RX ADMIN — ASPIRIN 325 MG ORAL TABLET 325 MG: 325 PILL ORAL at 08:44

## 2021-02-07 RX ADMIN — INSULIN LISPRO 2 UNITS: 100 INJECTION, SOLUTION INTRAVENOUS; SUBCUTANEOUS at 07:30

## 2021-02-07 RX ADMIN — DILTIAZEM HYDROCHLORIDE 240 MG: 120 CAPSULE, COATED, EXTENDED RELEASE ORAL at 08:44

## 2021-02-07 RX ADMIN — OXYBUTYNIN CHLORIDE 5 MG: 5 TABLET ORAL at 08:44

## 2021-02-07 RX ADMIN — INSULIN LISPRO 2 UNITS: 100 INJECTION, SOLUTION INTRAVENOUS; SUBCUTANEOUS at 16:30

## 2021-02-07 RX ADMIN — OXYBUTYNIN CHLORIDE 5 MG: 5 TABLET ORAL at 16:44

## 2021-02-07 RX ADMIN — ATORVASTATIN CALCIUM 40 MG: 40 TABLET, FILM COATED ORAL at 08:44

## 2021-02-07 RX ADMIN — LEVOTHYROXINE SODIUM 50 MCG: 0.03 TABLET ORAL at 05:05

## 2021-02-07 RX ADMIN — OXYBUTYNIN CHLORIDE 5 MG: 5 TABLET ORAL at 20:12

## 2021-02-07 RX ADMIN — HYDROXYUREA 500 MG: 500 CAPSULE ORAL at 20:12

## 2021-02-07 RX ADMIN — INSULIN LISPRO 2 UNITS: 100 INJECTION, SOLUTION INTRAVENOUS; SUBCUTANEOUS at 11:30

## 2021-02-07 NOTE — PROGRESS NOTES
Hospitalist Progress Note    Subjective:   Daily Progress Note: 2/7/2021     Harry Fox is a 80 y.o. male with history of dementia, type 2 diabetes, hypertension, spinal stenosis, hyperlipidemia, and atrial fibrillation who presented to the ED with right hip pain after a fall 2 days prior. He reports he tried to stand up out of a chair when he fell backwards mostly onto his right side/buttocks. He was able to ambulate with pain 2 days ago and it has progressively become worse. He is unable to ambulate secondary to pain. Head CT shows stable white matter changes with chronic microvascular disease. Pelvic CT negative for acute fracture or dislocation. 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 Lumbar spine CT shows abdominal aorta atherosclerosis, no evidence of vertebral fracture. Ortho consulted. Patient became more confused today and refuses MRI. Patient examined at bedside. He is confused and not cooperative.      Current Facility-Administered Medications   Medication Dose Route Frequency    dilTIAZem ER (CARDIZEM CD) capsule 240 mg  240 mg Oral DAILY    atorvastatin (LIPITOR) tablet 40 mg  40 mg Oral DAILY    aspirin tablet 325 mg  325 mg Oral DAILY    dabigatran etexilate (PRADAXA) capsule 75 mg  75 mg Oral Q12H    hydrOXYzine (VISTARIL) injection 25 mg  25 mg IntraMUSCular Q6H PRN    sodium chloride (NS) flush 5-40 mL  5-40 mL IntraVENous PRN    acetaminophen (TYLENOL) tablet 650 mg  650 mg Oral Q6H PRN    Or    acetaminophen (TYLENOL) suppository 650 mg  650 mg Rectal Q6H PRN    polyethylene glycol (MIRALAX) packet 17 g  17 g Oral DAILY PRN    promethazine (PHENERGAN) tablet 12.5 mg  12.5 mg Oral Q6H PRN    Or    ondansetron (ZOFRAN) injection 4 mg  4 mg IntraVENous Q6H PRN    hydroxyurea (HYDREA) chemo cap 500 mg  500 mg Oral BID    levothyroxine (SYNTHROID) tablet 50 mcg  50 mcg Oral ACB    tamsulosin (FLOMAX) capsule 0.4 mg  0.4 mg Oral DAILY    oxybutynin (DITROPAN) tablet 5 mg 5 mg Oral TID    glucose chewable tablet 16 g  4 Tab Oral PRN    dextrose (D50W) injection syrg 12.5-25 g  25-50 mL IntraVENous PRN    glucagon (GLUCAGEN) injection 1 mg  1 mg IntraMUSCular PRN    insulin lispro (HUMALOG) injection   SubCUTAneous AC&HS    oxyCODONE IR (ROXICODONE) tablet 5 mg  5 mg Oral Q4H PRN    traMADoL (ULTRAM) tablet 50 mg  50 mg Oral Q6H PRN        Review of Systems  Review of Systems   Unable to perform ROS: Dementia   Constitutional: Negative. HENT: Negative. Eyes: Negative. Respiratory: Negative for cough and shortness of breath. Cardiovascular: Negative for chest pain and leg swelling. Gastrointestinal: Negative for abdominal pain, nausea and vomiting. Genitourinary: Negative. Musculoskeletal: Negative. Skin: Negative. Neurological: Positive for weakness and headaches. Psychiatric/Behavioral: Negative. Objective:     Visit Vitals  BP (!) 147/89   Pulse 94   Temp 97.4 °F (36.3 °C)   Resp 18   Ht 6' 1\" (1.854 m)   Wt 95.3 kg (210 lb)   SpO2 95%   BMI 27.71 kg/m²      O2 Device: Room air    Temp (24hrs), Av.6 °F (36.4 °C), Min:97.3 °F (36.3 °C), Max:98.2 °F (36.8 °C)      No intake/output data recorded. No intake/output data recorded.     Recent Results (from the past 24 hour(s))   GLUCOSE, POC    Collection Time: 21 11:04 AM   Result Value Ref Range    Glucose (POC) 221 (H) 65 - 100 mg/dL    Performed by Becky Baig    CBC WITH AUTOMATED DIFF    Collection Time: 21  1:47 PM   Result Value Ref Range    WBC 8.9 4.1 - 11.1 K/uL    RBC 3.02 (L) 4.10 - 5.70 M/uL    HGB 13.4 12.1 - 17.0 g/dL    HCT 38.4 36.6 - 50.3 %    .2 (H) 80.0 - 99.0 FL    MCH 44.4 (H) 26.0 - 34.0 PG    MCHC 34.9 30.0 - 36.5 g/dL    RDW 14.4 11.5 - 14.5 %    PLATELET 063 725 - 069 K/uL    MPV 10.1 8.9 - 12.9 FL    NEUTROPHILS 86 (H) 32 - 75 %    LYMPHOCYTES 9 (L) 12 - 49 %    MONOCYTES 5 5 - 13 %    EOSINOPHILS 0 0 - 7 %    BASOPHILS 0 0 - 1 % IMMATURE GRANULOCYTES 0 0.0 - 0.5 %    ABS. NEUTROPHILS 7.6 1.8 - 8.0 K/UL    ABS. LYMPHOCYTES 0.8 0.8 - 3.5 K/UL    ABS. MONOCYTES 0.5 0.0 - 1.0 K/UL    ABS. EOSINOPHILS 0.0 0.0 - 0.4 K/UL    ABS. BASOPHILS 0.0 0.0 - 0.1 K/UL    ABS. IMM. GRANS. 0.0 0.00 - 0.04 K/UL    DF AUTOMATED     METABOLIC PANEL, COMPREHENSIVE    Collection Time: 02/06/21  1:47 PM   Result Value Ref Range    Sodium 138 136 - 145 mmol/L    Potassium 3.6 3.5 - 5.1 mmol/L    Chloride 104 97 - 108 mmol/L    CO2 30 21 - 32 mmol/L    Anion gap 4 (L) 5 - 15 mmol/L    Glucose 168 (H) 65 - 100 mg/dL    BUN 27 (H) 6 - 20 mg/dL    Creatinine 1.28 0.70 - 1.30 mg/dL    BUN/Creatinine ratio 21 (H) 12 - 20      GFR est AA >60 >60 ml/min/1.73m2    GFR est non-AA 53 (L) >60 ml/min/1.73m2    Calcium 8.8 8.5 - 10.1 mg/dL    Bilirubin, total 0.7 0.2 - 1.0 mg/dL    AST (SGOT) 14 (L) 15 - 37 U/L    ALT (SGPT) 20 12 - 78 U/L    Alk. phosphatase 94 45 - 117 U/L    Protein, total 7.2 6.4 - 8.2 g/dL    Albumin 2.6 (L) 3.5 - 5.0 g/dL    Globulin 4.6 (H) 2.0 - 4.0 g/dL    A-G Ratio 0.6 (L) 1.1 - 2.2     GLUCOSE, POC    Collection Time: 02/06/21  4:44 PM   Result Value Ref Range    Glucose (POC) 204 (H) 65 - 100 mg/dL    Performed by Kyler, POC    Collection Time: 02/06/21  7:36 PM   Result Value Ref Range    Glucose (POC) 164 (H) 65 - 100 mg/dL    Performed by Farrukh Jones    GLUCOSE, POC    Collection Time: 02/07/21  7:55 AM   Result Value Ref Range    Glucose (POC) 142 (H) 65 - 100 mg/dL    Performed by Long Walter         CT PELV WO CONT   Final Result   No pelvic fracture. No fracture or dislocation either hip. Abdominal aorta and pelvic arteries atherosclerosis. Prostate enlargement. XR HIP RT W OR WO PELV 2-3 VWS   Final Result      XR CHEST SNGL V   Final Result      CT SPINE LUMB WO CONT   Final Result   No CT evidence for lumbar vertebral body compression deformity,   retropulsed bone, or malalignment.    Noted advanced abdominal aorta atherosclerosis. With any persistent clinical concern for radicular-type signs or symptoms, MRI   of the lumbar spine may be warranted. CT HEAD WO CONT   Final Result      1. No acute intracranial abnormality      2. Stable underlying white matter changes, compatible with moderate to severe   chronic microvascular disease. 3. Ventricles appear slightly prominent for the degree of cerebral volume loss,   which may represent central volume loss. Normal pressure hydrocephalus may have   overlapping CT appearance and correlation with physical examination is   recommended. PHYSICAL EXAM:    Physical Exam  Constitutional:       General: He is not in acute distress. Appearance: Normal appearance. He is normal weight. HENT:      Head: Normocephalic and atraumatic. Right Ear: External ear normal.      Left Ear: External ear normal.      Nose: Nose normal.      Mouth/Throat:      Mouth: Mucous membranes are dry. Pharynx: Oropharynx is clear. Cardiovascular:      Rate and Rhythm: Normal rate. Rhythm irregular. Heart sounds: Normal heart sounds. Pulmonary:      Effort: Pulmonary effort is normal.      Breath sounds: Normal breath sounds. Musculoskeletal:         General: No tenderness or signs of injury. Left lower leg: No edema. Skin:     Findings: Bruising: lower extremities have mild bruising. Neurological:      General: No focal deficit present. Mental Status: He is alert. He is disoriented. Motor: Weakness (LE bilaterally) present.           Data Review    Recent Results (from the past 24 hour(s))   GLUCOSE, POC    Collection Time: 02/06/21 11:04 AM   Result Value Ref Range    Glucose (POC) 221 (H) 65 - 100 mg/dL    Performed by Becky Baig    CBC WITH AUTOMATED DIFF    Collection Time: 02/06/21  1:47 PM   Result Value Ref Range    WBC 8.9 4.1 - 11.1 K/uL    RBC 3.02 (L) 4.10 - 5.70 M/uL    HGB 13.4 12.1 - 17.0 g/dL    HCT 38.4 36.6 - 50.3 %    .2 (H) 80.0 - 99.0 FL    MCH 44.4 (H) 26.0 - 34.0 PG    MCHC 34.9 30.0 - 36.5 g/dL    RDW 14.4 11.5 - 14.5 %    PLATELET 201 150 - 400 K/uL    MPV 10.1 8.9 - 12.9 FL    NEUTROPHILS 86 (H) 32 - 75 %    LYMPHOCYTES 9 (L) 12 - 49 %    MONOCYTES 5 5 - 13 %    EOSINOPHILS 0 0 - 7 %    BASOPHILS 0 0 - 1 %    IMMATURE GRANULOCYTES 0 0.0 - 0.5 %    ABS. NEUTROPHILS 7.6 1.8 - 8.0 K/UL    ABS. LYMPHOCYTES 0.8 0.8 - 3.5 K/UL    ABS. MONOCYTES 0.5 0.0 - 1.0 K/UL    ABS. EOSINOPHILS 0.0 0.0 - 0.4 K/UL    ABS. BASOPHILS 0.0 0.0 - 0.1 K/UL    ABS. IMM. GRANS. 0.0 0.00 - 0.04 K/UL    DF AUTOMATED     METABOLIC PANEL, COMPREHENSIVE    Collection Time: 02/06/21  1:47 PM   Result Value Ref Range    Sodium 138 136 - 145 mmol/L    Potassium 3.6 3.5 - 5.1 mmol/L    Chloride 104 97 - 108 mmol/L    CO2 30 21 - 32 mmol/L    Anion gap 4 (L) 5 - 15 mmol/L    Glucose 168 (H) 65 - 100 mg/dL    BUN 27 (H) 6 - 20 mg/dL    Creatinine 1.28 0.70 - 1.30 mg/dL    BUN/Creatinine ratio 21 (H) 12 - 20      GFR est AA >60 >60 ml/min/1.73m2    GFR est non-AA 53 (L) >60 ml/min/1.73m2    Calcium 8.8 8.5 - 10.1 mg/dL    Bilirubin, total 0.7 0.2 - 1.0 mg/dL    AST (SGOT) 14 (L) 15 - 37 U/L    ALT (SGPT) 20 12 - 78 U/L    Alk. phosphatase 94 45 - 117 U/L    Protein, total 7.2 6.4 - 8.2 g/dL    Albumin 2.6 (L) 3.5 - 5.0 g/dL    Globulin 4.6 (H) 2.0 - 4.0 g/dL    A-G Ratio 0.6 (L) 1.1 - 2.2     GLUCOSE, POC    Collection Time: 02/06/21  4:44 PM   Result Value Ref Range    Glucose (POC) 204 (H) 65 - 100 mg/dL    Performed by Edd Segura    GLUCOSE, POC    Collection Time: 02/06/21  7:36 PM   Result Value Ref Range    Glucose (POC) 164 (H) 65 - 100 mg/dL    Performed by Williams Cervantes    GLUCOSE, POC    Collection Time: 02/07/21  7:55 AM   Result Value Ref Range    Glucose (POC) 142 (H) 65 - 100 mg/dL    Performed by Delon Waters         Assessment/Plan:     1. 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 Orthopedic consult, no treatment necessary  MRI not indicated as he now has flexion and extension of the hip  Tramadol and oxycodone for pain  PT/OT evaluation reassessment  Re-evaluate for SNF/Rehab now that he is moving right eg     2. Ground level fall  UA negative for UTI  Troponin negative  Head CT shows stable white matter changes with chronic microvascular disease     3. Type II diabetes  SSI     4. Hypertension  Metoprolol  Blood pressure well-controlled     5. Atrial fibrillation  Hold pradaxa for falls  Metoprolol and diltiazem     6. Hypothyroidism  Synthroid     7. History of TIA  Hold pradaxa  Stroke work up:  EKG  Carotid duplex mild carotid stenosis  Neurology consult, Dr. Philippe Castorena  ECHO EF 79%. Mild dilatation of the right ventricle and right atrium. Tricuspid valve regurgitation with right ventricular systolic pressure 41  Lipid panel     8. BPH  Tamsulosin and detrol     9. Polycythemia  Hydroxyurea  Hgb 13.2     DVT prophylaxis: SCD's  Ulcer prophylaxis: protonix     Code status: DNR     Update wife, Danilo Gutierrez, at [phone]    Reevaluate needs and possible placement per physical therapy and Occupational Therapy evaluation    Care Plan discussed with: Patient/Family, Nurse and     Total time spent with patient: 32 minutes.

## 2021-02-07 NOTE — PROGRESS NOTES
NEURO PROGRESS NOTE        SUBJECTIVE:   Recurrent falls  Right hip pain  Beclouded dementia  Other medical problems    EXAM:  Awake, slightly less lethargic this morning  Reportedly did not get MRI because of confusion  Follows one-step commands  Right leg pain still present      ASSESSMENT/PLAN:  Current treatment continues    ALLERGIES:    No Known Allergies    MEDS:      Current Facility-Administered Medications:     dilTIAZem ER (CARDIZEM CD) capsule 240 mg, 240 mg, Oral, DAILY, Nikos Alvares MD, 240 mg at 02/07/21 0844    atorvastatin (LIPITOR) tablet 40 mg, 40 mg, Oral, DAILY, Sudhakar Paez PA-C, 40 mg at 02/07/21 0844    aspirin tablet 325 mg, 325 mg, Oral, DAILY, Sudhakar Paez PA-C, 325 mg at 02/07/21 0844    dabigatran etexilate (PRADAXA) capsule 75 mg, 75 mg, Oral, Q12H, Maya Paez PA-C, Stopped at 02/04/21 2100    hydrOXYzine (VISTARIL) injection 25 mg, 25 mg, IntraMUSCular, Q6H PRN, Sudhakar Paez PA-C, 25 mg at 02/04/21 1832    sodium chloride (NS) flush 5-40 mL, 5-40 mL, IntraVENous, PRN, Maya Paez PA-C    acetaminophen (TYLENOL) tablet 650 mg, 650 mg, Oral, Q6H PRN **OR** acetaminophen (TYLENOL) suppository 650 mg, 650 mg, Rectal, Q6H PRN, Maya Paez PA-C    polyethylene glycol (MIRALAX) packet 17 g, 17 g, Oral, DAILY PRN, Maya Paez PA-C    promethazine (PHENERGAN) tablet 12.5 mg, 12.5 mg, Oral, Q6H PRN **OR** ondansetron (ZOFRAN) injection 4 mg, 4 mg, IntraVENous, Q6H PRN, Sudhakar Paez PA-C    hydroxyurea (HYDREA) chemo cap 500 mg, 500 mg, Oral, BID, Sudhakar Paez PA-C, 500 mg at 02/07/21 0844    levothyroxine (SYNTHROID) tablet 50 mcg, 50 mcg, Oral, ACB, Sudhakar Paez PA-C, 50 mcg at 02/07/21 0505    tamsulosin (FLOMAX) capsule 0.4 mg, 0.4 mg, Oral, DAILY, Sudhakar Paez PA-C, 0.4 mg at 02/07/21 0844    oxybutynin (DITROPAN) tablet 5 mg, 5 mg, Oral, TID, Sudhakar Paez PA-C, 5 mg at 02/07/21 6007    glucose chewable tablet 16 g, 4 Tab, Oral, PRN, Sudhakar Paez PA-C    dextrose (D50W) injection syrg 12.5-25 g, 25-50 mL, IntraVENous, PRN, Pepe Paez PA-C    glucagon (GLUCAGEN) injection 1 mg, 1 mg, IntraMUSCular, PRN, Pepe Paez PA-C    insulin lispro (HUMALOG) injection, , SubCUTAneous, AC&HS, Fabby Oneill PA-C, 2 Units at 02/07/21 1130    oxyCODONE IR (ROXICODONE) tablet 5 mg, 5 mg, Oral, Q4H PRN, Pepe Paez PA-C, 5 mg at 02/03/21 2354    traMADoL (ULTRAM) tablet 50 mg, 50 mg, Oral, Q6H PRN, Pepe Paez PA-C    LABS:  Recent Results (from the past 24 hour(s))   GLUCOSE, POC    Collection Time: 02/06/21  4:44 PM   Result Value Ref Range    Glucose (POC) 204 (H) 65 - 100 mg/dL    Performed by Kyler, POC    Collection Time: 02/06/21  7:36 PM   Result Value Ref Range    Glucose (POC) 164 (H) 65 - 100 mg/dL    Performed by Helga Quintero    GLUCOSE, POC    Collection Time: 02/07/21  7:55 AM   Result Value Ref Range    Glucose (POC) 142 (H) 65 - 100 mg/dL    Performed by Marleny Hamilton    CBC WITH AUTOMATED DIFF    Collection Time: 02/07/21 10:58 AM   Result Value Ref Range    WBC 8.1 4.1 - 11.1 K/uL    RBC 2.93 (L) 4.10 - 5.70 M/uL    HGB 12.7 12.1 - 17.0 g/dL    HCT 37.1 36.6 - 50.3 %    .6 (H) 80.0 - 99.0 FL    MCH 43.3 (H) 26.0 - 34.0 PG    MCHC 34.2 30.0 - 36.5 g/dL    RDW 14.3 11.5 - 14.5 %    PLATELET 778 271 - 961 K/uL    MPV 10.5 8.9 - 12.9 FL    NEUTROPHILS 84 (H) 32 - 75 %    LYMPHOCYTES 10 (L) 12 - 49 %    MONOCYTES 6 5 - 13 %    EOSINOPHILS 0 0 - 7 %    BASOPHILS 0 0 - 1 %    IMMATURE GRANULOCYTES 0 0.0 - 0.5 %    ABS. NEUTROPHILS 6.9 1.8 - 8.0 K/UL    ABS. LYMPHOCYTES 0.8 0.8 - 3.5 K/UL    ABS. MONOCYTES 0.5 0.0 - 1.0 K/UL    ABS. EOSINOPHILS 0.0 0.0 - 0.4 K/UL    ABS. BASOPHILS 0.0 0.0 - 0.1 K/UL    ABS. IMM.  GRANS. 0.0 0.00 - 0.04 K/UL    DF AUTOMATED     METABOLIC PANEL, COMPREHENSIVE    Collection Time: 02/07/21 10:58 AM   Result Value Ref Range    Sodium 139 136 - 145 mmol/L    Potassium 3.2 (L) 3.5 - 5.1 mmol/L    Chloride 104 97 - 108 mmol/L    CO2 29 21 - 32 mmol/L    Anion gap 6 5 - 15 mmol/L    Glucose 122 (H) 65 - 100 mg/dL    BUN 31 (H) 6 - 20 mg/dL    Creatinine 1.21 0.70 - 1.30 mg/dL    BUN/Creatinine ratio 26 (H) 12 - 20      GFR est AA >60 >60 ml/min/1.73m2    GFR est non-AA 57 (L) >60 ml/min/1.73m2    Calcium 9.0 8.5 - 10.1 mg/dL    Bilirubin, total 0.6 0.2 - 1.0 mg/dL    AST (SGOT) 16 15 - 37 U/L    ALT (SGPT) 23 12 - 78 U/L    Alk. phosphatase 99 45 - 117 U/L    Protein, total 7.1 6.4 - 8.2 g/dL    Albumin 2.5 (L) 3.5 - 5.0 g/dL    Globulin 4.6 (H) 2.0 - 4.0 g/dL    A-G Ratio 0.5 (L) 1.1 - 2.2     GLUCOSE, POC    Collection Time: 02/07/21 11:44 AM   Result Value Ref Range    Glucose (POC) 181 (H) 65 - 100 mg/dL    Performed by Candis Ruiz        Visit Vitals  /73 (BP 1 Location: Right lower arm)   Pulse 90   Temp 97.6 °F (36.4 °C)   Resp 18   Ht 6' 1\" (1.854 m)   Wt 95.3 kg (210 lb)   SpO2 93%   BMI 27.71 kg/m²       Imaging:  CT PELV WO CONT   Final Result   No pelvic fracture. No fracture or dislocation either hip. Abdominal aorta and pelvic arteries atherosclerosis. Prostate enlargement. XR HIP RT W OR WO PELV 2-3 VWS   Final Result      XR CHEST SNGL V   Final Result      CT SPINE LUMB WO CONT   Final Result   No CT evidence for lumbar vertebral body compression deformity,   retropulsed bone, or malalignment. Noted advanced abdominal aorta atherosclerosis. With any persistent clinical concern for radicular-type signs or symptoms, MRI   of the lumbar spine may be warranted. CT HEAD WO CONT   Final Result      1. No acute intracranial abnormality      2. Stable underlying white matter changes, compatible with moderate to severe   chronic microvascular disease.       3. Ventricles appear slightly prominent for the degree of cerebral volume loss,   which may represent central volume loss. Normal pressure hydrocephalus may have   overlapping CT appearance and correlation with physical examination is   recommended.

## 2021-02-08 VITALS
WEIGHT: 210 LBS | BODY MASS INDEX: 27.83 KG/M2 | TEMPERATURE: 97.6 F | DIASTOLIC BLOOD PRESSURE: 86 MMHG | HEIGHT: 73 IN | RESPIRATION RATE: 18 BRPM | OXYGEN SATURATION: 96 % | SYSTOLIC BLOOD PRESSURE: 148 MMHG | HEART RATE: 89 BPM

## 2021-02-08 PROBLEM — R53.1 WEAKNESS: Status: ACTIVE | Noted: 2021-02-08

## 2021-02-08 LAB
ALBUMIN SERPL-MCNC: 2.5 G/DL (ref 3.5–5)
ALBUMIN/GLOB SERPL: 0.5 {RATIO} (ref 1.1–2.2)
ALP SERPL-CCNC: 100 U/L (ref 45–117)
ALT SERPL-CCNC: 26 U/L (ref 12–78)
ANION GAP SERPL CALC-SCNC: 9 MMOL/L (ref 5–15)
AST SERPL W P-5'-P-CCNC: 20 U/L (ref 15–37)
BASOPHILS # BLD: 0 K/UL (ref 0–0.1)
BASOPHILS NFR BLD: 0 % (ref 0–1)
BILIRUB SERPL-MCNC: 0.6 MG/DL (ref 0.2–1)
BUN SERPL-MCNC: 30 MG/DL (ref 6–20)
BUN/CREAT SERPL: 30 (ref 12–20)
CA-I BLD-MCNC: 9.2 MG/DL (ref 8.5–10.1)
CHLORIDE SERPL-SCNC: 104 MMOL/L (ref 97–108)
CO2 SERPL-SCNC: 27 MMOL/L (ref 21–32)
CREAT SERPL-MCNC: 1 MG/DL (ref 0.7–1.3)
DIFFERENTIAL METHOD BLD: ABNORMAL
EOSINOPHIL # BLD: 0 K/UL (ref 0–0.4)
EOSINOPHIL NFR BLD: 0 % (ref 0–7)
ERYTHROCYTE [DISTWIDTH] IN BLOOD BY AUTOMATED COUNT: 14.2 % (ref 11.5–14.5)
GLOBULIN SER CALC-MCNC: 4.7 G/DL (ref 2–4)
GLUCOSE BLD STRIP.AUTO-MCNC: 130 MG/DL (ref 65–100)
GLUCOSE BLD STRIP.AUTO-MCNC: 191 MG/DL (ref 65–100)
GLUCOSE BLD STRIP.AUTO-MCNC: 223 MG/DL (ref 65–100)
GLUCOSE SERPL-MCNC: 141 MG/DL (ref 65–100)
HCT VFR BLD AUTO: 39.6 % (ref 36.6–50.3)
HGB BLD-MCNC: 13.7 G/DL (ref 12.1–17)
IMM GRANULOCYTES # BLD AUTO: 0 K/UL (ref 0–0.04)
IMM GRANULOCYTES NFR BLD AUTO: 0 % (ref 0–0.5)
LYMPHOCYTES # BLD: 1.3 K/UL (ref 0.8–3.5)
LYMPHOCYTES NFR BLD: 14 % (ref 12–49)
MCH RBC QN AUTO: 43.5 PG (ref 26–34)
MCHC RBC AUTO-ENTMCNC: 34.6 G/DL (ref 30–36.5)
MCV RBC AUTO: 125.7 FL (ref 80–99)
MONOCYTES # BLD: 0.6 K/UL (ref 0–1)
MONOCYTES NFR BLD: 6 % (ref 5–13)
NEUTS SEG # BLD: 7.5 K/UL (ref 1.8–8)
NEUTS SEG NFR BLD: 80 % (ref 32–75)
PERFORMED BY, TECHID: ABNORMAL
PLATELET # BLD AUTO: 209 K/UL (ref 150–400)
PMV BLD AUTO: 10.2 FL (ref 8.9–12.9)
POTASSIUM SERPL-SCNC: 3.5 MMOL/L (ref 3.5–5.1)
PROT SERPL-MCNC: 7.2 G/DL (ref 6.4–8.2)
RBC # BLD AUTO: 3.15 M/UL (ref 4.1–5.7)
SODIUM SERPL-SCNC: 140 MMOL/L (ref 136–145)
WBC # BLD AUTO: 9.4 K/UL (ref 4.1–11.1)

## 2021-02-08 PROCEDURE — 85025 COMPLETE CBC W/AUTO DIFF WBC: CPT

## 2021-02-08 PROCEDURE — 74011636637 HC RX REV CODE- 636/637: Performed by: PHYSICIAN ASSISTANT

## 2021-02-08 PROCEDURE — 74011250637 HC RX REV CODE- 250/637: Performed by: PHYSICIAN ASSISTANT

## 2021-02-08 PROCEDURE — 97530 THERAPEUTIC ACTIVITIES: CPT

## 2021-02-08 PROCEDURE — 74011250637 HC RX REV CODE- 250/637: Performed by: INTERNAL MEDICINE

## 2021-02-08 PROCEDURE — 80053 COMPREHEN METABOLIC PANEL: CPT

## 2021-02-08 PROCEDURE — 74011250636 HC RX REV CODE- 250/636: Performed by: PHYSICIAN ASSISTANT

## 2021-02-08 PROCEDURE — 36415 COLL VENOUS BLD VENIPUNCTURE: CPT

## 2021-02-08 PROCEDURE — 82962 GLUCOSE BLOOD TEST: CPT

## 2021-02-08 RX ADMIN — DABIGATRAN ETEXILATE MESYLATE 75 MG: 75 CAPSULE ORAL at 09:43

## 2021-02-08 RX ADMIN — ASPIRIN 325 MG ORAL TABLET 325 MG: 325 PILL ORAL at 09:43

## 2021-02-08 RX ADMIN — LEVOTHYROXINE SODIUM 50 MCG: 0.03 TABLET ORAL at 05:19

## 2021-02-08 RX ADMIN — OXYBUTYNIN CHLORIDE 5 MG: 5 TABLET ORAL at 09:44

## 2021-02-08 RX ADMIN — INSULIN LISPRO 2 UNITS: 100 INJECTION, SOLUTION INTRAVENOUS; SUBCUTANEOUS at 12:19

## 2021-02-08 RX ADMIN — OXYBUTYNIN CHLORIDE 5 MG: 5 TABLET ORAL at 16:39

## 2021-02-08 RX ADMIN — ATORVASTATIN CALCIUM 40 MG: 40 TABLET, FILM COATED ORAL at 09:44

## 2021-02-08 RX ADMIN — HYDROXYUREA 500 MG: 500 CAPSULE ORAL at 09:44

## 2021-02-08 RX ADMIN — DILTIAZEM HYDROCHLORIDE 240 MG: 120 CAPSULE, COATED, EXTENDED RELEASE ORAL at 09:43

## 2021-02-08 RX ADMIN — TAMSULOSIN HYDROCHLORIDE 0.4 MG: 0.4 CAPSULE ORAL at 09:44

## 2021-02-08 RX ADMIN — INSULIN LISPRO 3 UNITS: 100 INJECTION, SOLUTION INTRAVENOUS; SUBCUTANEOUS at 16:38

## 2021-02-08 NOTE — H&P
Admit date: 2/3/2021   Admitting Provider: Jason Stokes MD    Discharge date: 2/8/2021  Discharging Provider: Maddy Stone PA-C      * Admission Diagnoses: Fall [W19. XXXA]    * Discharge Diagnoses:    Hospital Problems as of 2/8/2021 Never Reviewed          Codes Class Noted - Resolved POA    Weakness ICD-10-CM: R53.1  ICD-9-CM: 780.79  2/8/2021 - Present Unknown        Fall ICD-10-CM: W19. Jesse Poplar  ICD-9-CM: E888.9  2/3/2021 - Present Unknown              * Hospital Course:   Antoinette Nugent a 80 y. o. male with history of dementia, type 2 diabetes, hypertension, spinal stenosis, hyperlipidemia, and atrial fibrillation who presented to the ED with right hip pain after a fall 2 days prior. He reports he tried to stand up out of a chair when he fell backwards mostly onto his right side/buttocks. He was able to ambulate with pain 2 days ago and it has progressively become worse. He is unable to ambulate secondary to pain. Head CT shows stable white matter changes with chronic microvascular disease. Pelvic CT negative for acute fracture or dislocation. 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 Lumbar spine CT shows abdominal aorta atherosclerosis, no evidence of vertebral fracture. Ortho consulted. Patient became more confused  and refuses MRI. Patient brought Vistaril for improvement is over confusion and combative nature. This has resolved. Stroke work-up included echocardiogram which was normal with an EF of 79%. Carotid duplex with mild stenosis bilaterally. EKG without obvious atrial fibrillation. Patient was already on Lipitor and will remain on the 20 mg. Patient be discharged to [facility] for further rehab as he is unable to ambulate without maximal assistance at this point. Patient's wife is in acceptance of this discharge plan. No new medications provided during this hospital encounter.  Orthopedic consultation we have revealed no immediate need for any surgery. * Procedures:   * No surgery found *      Consults:   Neurology and orthopedics    Significant Diagnostic Studies: As discussed in hospital course    Discharge Exam:  Visit Vitals  BP (!) 148/86   Pulse 89   Temp 97.6 °F (36.4 °C)   Resp 18   Ht 6' 1\" (1.854 m)   Wt 95.3 kg (210 lb)   SpO2 96%   BMI 27.71 kg/m²     PHYSICAL EXAM:  Constitutional: Alert in no acute distress   HEENT: Sclerae anicteric, The neck is supple. Cardiovascular: Regular rate and rhythm. No murmurs, gallops, or rubs. Cora Graven Respiratory: Clear breath sounds with no wheezes, rales, or rhonchi. GI: Abdomen nondistended, soft, and nontender. Normal active bowel sounds. Rectal: Deferred   Musculoskeletal: No pitting edema of the lower legs. Extremities have good range of motion. Neurological:  Patient is alert and oriented. Cranial nerves II-XII intact  Psychiatric: Mood appears appropriate with judgement intact. Lymphatic: No cervical or supraclavicular adenopathy. Skin: No rashes or breakdown of the skin      * Discharge Condition: stable  * Disposition: [facility]    Discharge Medications:  Current Discharge Medication List      CONTINUE these medications which have NOT CHANGED    Details   metoprolol succinate (TOPROL-XL) 50 mg XL tablet Take 1 Tab by mouth daily. atorvastatin (LIPITOR) 20 mg tablet Take 1 Tab by mouth daily. aspirin delayed-release 81 mg tablet Take 1 Tab by mouth daily. lisinopriL (PRINIVIL, ZESTRIL) 40 mg tablet Take 1 Tab by mouth daily. finasteride (PROSCAR) 5 mg tablet Take 1 Tab by mouth daily. alogliptin (Nesina) 25 mg tablet Take 1 Tab by mouth daily as needed. digoxin (LANOXIN) 0.125 mg tablet Take 1 Tab by mouth daily. levothyroxine (SYNTHROID) 50 mcg tablet Take 50 mcg by mouth Daily (before breakfast). glyBURIDE (DIABETA) 5 mg tablet Take 5 mg by mouth Daily (before breakfast).       metFORMIN (GLUCOPHAGE) 500 mg tablet Take 500 mg by mouth two (2) times a day. SITagliptin (Januvia) 100 mg tablet Take 100 mg by mouth daily. dilTIAZem ER (DILACOR XR) 240 mg capsule Take 240 mg by mouth daily. dabigatran etexilate (Pradaxa) 75 mg capsule Take 75 mg by mouth every twelve (12) hours. tamsulosin (FLOMAX) 0.4 mg capsule Take 0.4 mg by mouth daily. tolterodine ER (DETROL LA) 4 mg ER capsule Take 4 mg by mouth daily. hydroxyurea (HYDREA) 500 mg capsule Take 500 mg by mouth two (2) times a day. * Follow-up Care/Patient Instructions:   Activity: Activity as tolerated  Diet: Diabetic Diet  Wound Care: None needed    Follow-up Information     Follow up With Specialties Details Why Contact Info    Emre Giles, [address]   [address] [place] [address] [place]  [phone]            Discharge summary greater than 35 minutes spent with the patient performing discharge instructions, medication review and physical exam    Signed:  Estephania Cherry PA-C  2/8/2021  3:53 PM

## 2021-02-08 NOTE — PROGRESS NOTES
Patient has been accepted to [facility] for admission today. Patient will be going into room 219B . Nurse to call report to [phone]. CM called and spoke with NOK/Spouse Isabelle Sage [phone]. She agrees to discharge to SNF today. Discharge plan of care/case management plan validated with provider discharge order.  CM and primary nurse completed discharge checklist.

## 2021-02-08 NOTE — PROGRESS NOTES
[facility]. Gave patient SBAR report to Long [place] LPN. Patient was given discharge instructions and verbalized understanding. Patient was alert with no distress noted. Patient was discharged via stretcher/ambulance. Discharge plan of care/case management plan validated with provider discharge order.

## 2021-02-08 NOTE — PROGRESS NOTES
CM spoke with patient at bedside. Patient now agrees to have referral sent to SNF. Patient choice obtained for [facility]. CM sent referral to [facility], called admissions liaison to inquire about bed availability today. They should have a bed available for this gentleman today. CM awaiting bed assignment and will notify attending immediately via perfect serve when assignment is confirmed.

## 2021-02-08 NOTE — PROGRESS NOTES
PHYSICAL THERAPY TREATMENT  Patient: Shahab Maya (86 y.o. male)  Date: 2/8/2021  Diagnosis: Fall [W19.XXXA] <principal problem not specified>       Precautions:  falls  Chart, physical therapy assessment, plan of care and goals were reviewed.    ASSESSMENT  Patient continues with skilled PT services and is progressing towards goals. Pt received in semi-supine , agreeable for PT treatment .Pt presents with some confusion ,  no c/o pain , required modA for rolling to L side , mod/maxA for supine <>sit transfers , fair static sitting balance with R side lean , needed cues and assist to correct lean ,sit at the EOB for ~5-6 minutes .Completed there ex's  for b/l LE strengthening in unsupported sitting position .Required modA for sit <>stand transfers,modA for static standing balance with R side lean and flexed posture , needed cues and assist to correct lean and for upright posture . Pt with increased difficulty in advancing b/l LE for ambulation and taking side steps , took side steps-2' with RW, mod/maxA with cues for sequencing and safety .    Current Level of Function Impacting Discharge (mobility/balance): Requires mod/maxA for functional mobility .     Other factors to consider for discharge: time since onset , severity of deficits           PLAN :  Patient continues to benefit from skilled intervention to address the above impairments.  Continue treatment per established plan of care.  to address goals.    Recommendation for discharge: (in order for the patient to meet his/her long term goals)  SNF    This discharge recommendation:  Has been made in collaboration with the attending provider and/or case management    IF patient discharges home will need the following DME: wheelchair       SUBJECTIVE:   Patient stated “I am feeling better.”    OBJECTIVE DATA SUMMARY:   Critical Behavior:  Neurologic State: Alert  Orientation Level: Oriented to person, Oriented to place  Cognition: Decreased  attention/concentration  Safety/Judgement: Decreased insight into deficits, Decreased awareness of need for safety  Functional Mobility Training:  Bed Mobility:  Rolling: Moderate assistance  Supine to Sit: Moderate assistance;Maximum assistance  Sit to Supine: Moderate assistance;Maximum assistance  Scooting: Minimum assistance     Transfers:  Sit to Stand: Moderate assistance  Stand to Sit: Moderate assistance    Balance:  Sitting: Impaired; With support  Sitting - Static: Fair (occasional)  Sitting - Dynamic: Poor (constant support)  Standing: Impaired;Pull to stand; With support  Standing - Static: Poor  Ambulation/Gait Training:  Distance (ft): 2 Feet (ft)  Assistive Device: Walker, rolling  Ambulation - Level of Assistance: Maximum assistance      Therapeutic Exercises:         EXERCISE   Sets   Reps   Active Active Assist   Passive Self ROM   Comments   Ankle Pumps  20 [x] [] [] []    Quad Sets/Glut Sets   [] [] [] []    Hamstring Sets   [] [] [] []    Short Arc Quads   [] [] [] []    Heel Slides   [] [] [] []    Straight Leg Raises   [] [] [] []    Hip abd/add   [] [] [] []    Long Arc Quads  10 [x] [] [] []    Marching  10 [x] [] [] []       [] [] [] []       Pain Ratin/10    Activity Tolerance:   Fair  Please refer to the flowsheet for vital signs taken during this treatment. After treatment patient left in no apparent distress:   Supine in bed, Call bell within reach, and Bed / chair alarm activated    COMMUNICATION/COLLABORATION:   The patients plan of care was discussed with: Registered nurse and Case management.      [facility]   Time Calculation: 38 mins

## 2021-02-08 NOTE — PROGRESS NOTES
Problem: Self Care Deficits Care Plan (Adult)  Goal: *Acute Goals and Plan of Care (Insert Text)  Description: Pt will be min A sup<->sit in prep for EOB ADL's  Pt will be min A  LB dressing EOB level  Pt will be SBA  sit EOB 10 minutes in prep for EOB ADL's  Pt will be SBA  grooming EOB level  Pt will be min A  sit<-> prep for toilet transfer  Pt will be min A  BSC transfer with LRAD  Pt will be min A  toileting/cloth mgmt LRAD  Pt will progress to min A grooming standing sink  Pt will be SBA lane UE HEP in prep for self care tasks      Outcome: Not Met     OCCUPATIONAL THERAPY TREATMENT  Patient: Rebecca Baker (69 y.o. male)  Date: 2/8/2021  Diagnosis: Fall [W19. XXXA] <principal problem not specified>       Precautions:  Falls  Chart, occupational therapy assessment, plan of care, and goals were reviewed. ASSESSMENT  Pt making good progress towards goals. Pt with increased A&O today compared to eval. PT A&O to self and place. Pt currently requiring mod A rolling to left, mod A sup->sit able to pull self up using therapists hands, min A scooting EOB level, mod A sit<->Stand x3 attemptes and on third attempt able to stand and take aprox 3 side steps to chair with gait belt and 2 person assist with CNA. Pt educated on and completed lane UE HEP for 1 set of 10 reps in prep for self care tasks. Pt also SBA for simple grooming bed leve. Pt would benefit from continued skilled OT services while at [facility] in order to increase safety and independence wiht self care and functional transfers/mobility. Recommend discharge to SNF when medically appropriate. Other factors to consider for discharge: time since onset, severity of deficits, PLOF         PLAN :  Patient continues to benefit from skilled intervention to address the above impairments. Continue treatment per established plan of care. to address goals.     Recommend with staff: stand pivot back to bed with gait belt    Recommend next OT session: BSC transfer with 2 person assist    Recommendation for discharge: (in order for the patient to meet his/her long term goals)  SNF    This discharge recommendation:  Has been made in collaboration with the attending provider and/or case management    IF patient discharges home will need the following DME: TBD       SUBJECTIVE:   Patient stated i feel better today.     OBJECTIVE DATA SUMMARY:   Cognitive/Behavioral Status:  Neurologic State: Alert  Orientation Level: Oriented to person;Oriented to place  Cognition: Decreased attention/concentration        Safety/Judgement: Decreased insight into deficits; Decreased awareness of need for safety    Functional Mobility and Transfers for ADLs:  Bed Mobility:  Rolling: Moderate assistance  Supine to Sit: Moderate assistance  Sit to Supine: Moderate assistance;Maximum assistance  Scooting: Minimum assistance    Transfers:  Sit to Stand: Moderate assistance     Bed to Chair: Maximum assistance;Assist x2    Balance:  Sitting: Impaired; With support  Sitting - Static: Fair (occasional)  Sitting - Dynamic: Fair (occasional)  Standing: Impaired;Pull to stand; With support  Standing - Static: Constant support;Poor  Standing - Dynamic : Constant support;Poor    ADL Intervention:     Grooming  Grooming Assistance: Stand-by assistance  Position Performed: (semi supine in bed)  Washing Face: Stand-by assistance    Cognitive Retraining  Safety/Judgement: Decreased insight into deficits; Decreased awareness of need for safety    Therapeutic Exercises:   SBA lane UE HEP for 1 set of 10 reps in all planes in prep for self care tasks    Pain:  No pain reported    Activity Tolerance:   Fair and requires rest breaks  Please refer to the flowsheet for vital signs taken during this treatment. After treatment patient left in no apparent distress:   Sitting in chair and Call bell within reach    COMMUNICATION/COLLABORATION:   The patients plan of care was discussed with: Registered nurse. Ankush Perez  Time Calculation: 19 mins

## 2021-02-18 NOTE — PROGRESS NOTES
Physician Progress Note      PATIENT:               Bessy Lopez  CSN #:                  [id number]  :                       1934  ADMIT DATE:       2/3/2021 10:54 AM  DISCH DATE:        2021 7:01 PM  RESPONDING  PROVIDER #:        Rustam ALCANTARA PA-C          QUERY TEXT:    Dear Attending,    Pt admitted with right hip pain after fall. Pt noted to have DJD. If possible, please document in progress notes and discharge summary the relationship, if any, between right hip pain and DJD, or if right hip pain was due to fall. The medical record reflects the following:  Risk Factors: 80year old male, fall 2 days prior to admission, moderate DJD of both hips  Clinical Indicators:  H&P - presented to the ED with right hip pain after a fall 2 days prior. He reports he tried to stand up out of a chair when he fell backwards mostly onto his right side/buttocks. He was able to ambulate with pain 2 days ago and it has progressively become worse. He is unable to ambulate secondary to pain. Pelvic CT negative for acute fracture or dislocation. 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  Ortho consult -   Degenerative joint disease bilateral hips  No acute orthopedic surgical intervention needed at this time. The patient can ambulate weightbearing as tolerated. Treatment: Tramadol and Oxycodone for pain    Please call [phone] with any questions  Options provided:  -- Right hip pain due to DJD  -- Right hip pain due to fall  -- Other - I will add my own diagnosis  -- Disagree - Not applicable / Not valid  -- Disagree - Clinically unable to determine / Unknown  -- Refer to Clinical Documentation Reviewer    PROVIDER RESPONSE TEXT:    This patient has right hip pain due to fall. Query created by:  Clari Welch on 2021 9:04 AM      Electronically signed by:  Jesse Anthony PA-C 2021 9:08 AM

## 2021-02-18 NOTE — DISCHARGE SUMMARY
Admit date: 2/3/2021   Admitting Provider: Montez Lopez MD    Discharge date: 2/18/2021  Discharging Provider: Yashira Kaur PA-C      * Admission Diagnoses: Fall [W19. XXXA]    * Discharge Diagnoses:    Hospital Problems as of 2/8/2021 Never Reviewed          Codes Class Noted - Resolved POA    Weakness ICD-10-CM: R53.1  ICD-9-CM: 780.79  2/8/2021 - Present Unknown        Fall ICD-10-CM: W19. Andrew Yoon  ICD-9-CM: E888.9  2/3/2021 - Present Unknown              * Hospital Course:   Justin Goncalves a 80 y. o. male with history of dementia, type 2 diabetes, hypertension, spinal stenosis, hyperlipidemia, and atrial fibrillation who presented to the ED with right hip pain after a fall 2 days prior. He reports he tried to stand up out of a chair when he fell backwards mostly onto his right side/buttocks. He was able to ambulate with pain 2 days ago and it has progressively become worse. He is unable to ambulate secondary to pain. Head CT shows stable white matter changes with chronic microvascular disease. Pelvic CT negative for acute fracture or dislocation. 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 Lumbar spine CT shows abdominal aorta atherosclerosis, no evidence of vertebral fracture. Ortho consulted. Patient became more confused  and refuses MRI. Patient brought Vistaril for improvement is over confusion and combative nature. This has resolved. Stroke work-up included echocardiogram which was normal with an EF of 79%. Carotid duplex with mild stenosis bilaterally. EKG without obvious atrial fibrillation. Patient was already on Lipitor and will remain on the 20 mg. Patient be discharged to [facility] skilled nursing facility for further rehab as he is unable to ambulate without maximal assistance at this point. Patient's wife is in acceptance of this discharge plan. No new medications provided during this hospital encounter.  Orthopedic consultation we have revealed no immediate need for any surgery. * Procedures:   * No surgery found *      Consults:   Neurology and orthopedics    Significant Diagnostic Studies: As discussed in hospital course    Discharge Exam:  Visit Vitals  BP (!) 148/86   Pulse 89   Temp 97.6 °F (36.4 °C)   Resp 18   Ht 6' 1\" (1.854 m)   Wt 95.3 kg (210 lb)   SpO2 96%   BMI 27.71 kg/m²     PHYSICAL EXAM:  Constitutional: Alert in no acute distress   HEENT: Sclerae anicteric, The neck is supple. Cardiovascular: Regular rate and rhythm. No murmurs, gallops, or rubs. Cleophus Cheshire Respiratory: Clear breath sounds with no wheezes, rales, or rhonchi. GI: Abdomen nondistended, soft, and nontender. Normal active bowel sounds. Rectal: Deferred   Musculoskeletal: No pitting edema of the lower legs. Extremities have good range of motion. Neurological:  Patient is alert and oriented. Cranial nerves II-XII intact  Psychiatric: Mood appears appropriate with judgement intact. Lymphatic: No cervical or supraclavicular adenopathy. Skin: No rashes or breakdown of the skin      * Discharge Condition: stable  * Disposition: [address] [facility] nursing facility    Discharge Medications:  Discharge Medication List as of 2/8/2021  6:36 PM      CONTINUE these medications which have NOT CHANGED    Details   metoprolol succinate (TOPROL-XL) 50 mg XL tablet Take 1 Tab by mouth daily. , Historical Med      atorvastatin (LIPITOR) 20 mg tablet Take 1 Tab by mouth daily. , Historical Med      aspirin delayed-release 81 mg tablet Take 1 Tab by mouth daily. , Historical Med      lisinopriL (PRINIVIL, ZESTRIL) 40 mg tablet Take 1 Tab by mouth daily. , Historical Med      finasteride (PROSCAR) 5 mg tablet Take 1 Tab by mouth daily. , Historical Med      alogliptin (Nesina) 25 mg tablet Take 1 Tab by mouth daily as needed., Historical Med      digoxin (LANOXIN) 0.125 mg tablet Take 1 Tab by mouth daily. , Historical Med      levothyroxine (SYNTHROID) 50 mcg tablet Take 50 mcg by mouth Daily (before breakfast). , Historical Med      glyBURIDE (DIABETA) 5 mg tablet Take 5 mg by mouth Daily (before breakfast). , Historical Med      metFORMIN (GLUCOPHAGE) 500 mg tablet Take 500 mg by mouth two (2) times a day., Historical Med      SITagliptin (Januvia) 100 mg tablet Take 100 mg by mouth daily. , Historical Med      dilTIAZem ER (DILACOR XR) 240 mg capsule Take 240 mg by mouth daily. , Historical Med      dabigatran etexilate (Pradaxa) 75 mg capsule Take 75 mg by mouth every twelve (12) hours. , Historical Med      tamsulosin (FLOMAX) 0.4 mg capsule Take 0.4 mg by mouth daily. , Historical Med      tolterodine ER (DETROL LA) 4 mg ER capsule Take 4 mg by mouth daily. , Historical Med      hydroxyurea (HYDREA) 500 mg capsule Take 500 mg by mouth two (2) times a day., Historical Med             * Follow-up Care/Patient Instructions:   Activity: Activity as tolerated  Diet: Diabetic Diet  Wound Care: None needed    Follow-up Information     Follow up With Specialties Details Why Contact Info    Freya Olszewski, MD Family Medicine In 1 week  [facility]  [address]  [phone]            Discharge summary greater than 35 minutes spent with the patient performing discharge instructions, medication review and physical exam    Signed:  Freddie Gaviria PA-C  2/18/2021  3:53 PM

## 2021-02-21 ENCOUNTER — APPOINTMENT (OUTPATIENT)
Dept: GENERAL RADIOLOGY | Age: 86
DRG: 871 | End: 2021-02-21
Attending: STUDENT IN AN ORGANIZED HEALTH CARE EDUCATION/TRAINING PROGRAM
Payer: MEDICARE

## 2021-02-21 ENCOUNTER — HOSPITAL ENCOUNTER (INPATIENT)
Age: 86
LOS: 10 days | Discharge: HOME | DRG: 871 | End: 2021-03-03
Attending: STUDENT IN AN ORGANIZED HEALTH CARE EDUCATION/TRAINING PROGRAM | Admitting: INTERNAL MEDICINE
Payer: MEDICARE

## 2021-02-21 DIAGNOSIS — J69.0 ASPIRATION PNEUMONIA OF LOWER LOBE, UNSPECIFIED ASPIRATION PNEUMONIA TYPE, UNSPECIFIED LATERALITY (HCC): ICD-10-CM

## 2021-02-21 DIAGNOSIS — A41.9 SEPSIS, DUE TO UNSPECIFIED ORGANISM, UNSPECIFIED WHETHER ACUTE ORGAN DYSFUNCTION PRESENT (HCC): Primary | ICD-10-CM

## 2021-02-21 DIAGNOSIS — N39.0 URINARY TRACT INFECTION WITHOUT HEMATURIA, SITE UNSPECIFIED: ICD-10-CM

## 2021-02-21 DIAGNOSIS — N17.9 ACUTE RENAL FAILURE, UNSPECIFIED ACUTE RENAL FAILURE TYPE (HCC): ICD-10-CM

## 2021-02-21 DIAGNOSIS — R53.1 WEAKNESS: ICD-10-CM

## 2021-02-21 DIAGNOSIS — N19 RENAL FAILURE, UNSPECIFIED CHRONICITY: ICD-10-CM

## 2021-02-21 PROBLEM — J18.9 PNEUMONIA: Status: ACTIVE | Noted: 2021-02-21

## 2021-02-21 LAB
ABO + RH BLD: NORMAL
ALBUMIN SERPL-MCNC: 2 G/DL (ref 3.5–5)
ALBUMIN/GLOB SERPL: 0.5 {RATIO} (ref 1.1–2.2)
ALP SERPL-CCNC: 69 U/L (ref 45–117)
ALT SERPL-CCNC: 31 U/L (ref 12–78)
ANION GAP SERPL CALC-SCNC: 10 MMOL/L (ref 5–15)
APPEARANCE UR: ABNORMAL
AST SERPL W P-5'-P-CCNC: 53 U/L (ref 15–37)
ATRIAL RATE: 250 BPM
BACTERIA URNS QL MICRO: ABNORMAL /HPF
BACTERIA URNS QL MICRO: ABNORMAL /HPF
BASOPHILS # BLD: 0 K/UL (ref 0–0.1)
BASOPHILS NFR BLD: 0 % (ref 0–1)
BILIRUB SERPL-MCNC: 0.4 MG/DL (ref 0.2–1)
BILIRUB UR QL: NEGATIVE
BLOOD BANK CMNT PATIENT-IMP: NORMAL
BLOOD GROUP ANTIBODIES SERPL: NEGATIVE
BNP SERPL-MCNC: 4270 PG/ML
BUN SERPL-MCNC: 117 MG/DL (ref 6–20)
BUN/CREAT SERPL: 22 (ref 12–20)
CA-I BLD-MCNC: 8.1 MG/DL (ref 8.5–10.1)
CALCULATED P AXIS, ECG09: 159 DEGREES
CALCULATED R AXIS, ECG10: -72 DEGREES
CALCULATED T AXIS, ECG11: 116 DEGREES
CHLORIDE SERPL-SCNC: 116 MMOL/L (ref 97–108)
CO2 SERPL-SCNC: 22 MMOL/L (ref 21–32)
COLOR UR: ABNORMAL
CREAT SERPL-MCNC: 5.29 MG/DL (ref 0.7–1.3)
DIAGNOSIS, 93000: NORMAL
DIFFERENTIAL METHOD BLD: ABNORMAL
EOSINOPHIL # BLD: 0 K/UL (ref 0–0.4)
EOSINOPHIL NFR BLD: 0 % (ref 0–7)
ERYTHROCYTE [DISTWIDTH] IN BLOOD BY AUTOMATED COUNT: 14.8 % (ref 11.5–14.5)
GLOBULIN SER CALC-MCNC: 4.4 G/DL (ref 2–4)
GLUCOSE BLD STRIP.AUTO-MCNC: 143 MG/DL (ref 65–100)
GLUCOSE BLD STRIP.AUTO-MCNC: 183 MG/DL (ref 65–100)
GLUCOSE SERPL-MCNC: 170 MG/DL (ref 65–100)
GLUCOSE UR STRIP.AUTO-MCNC: NEGATIVE MG/DL
HCT VFR BLD AUTO: 41.2 % (ref 36.6–50.3)
HGB BLD-MCNC: 13.8 G/DL (ref 12.1–17)
HGB UR QL STRIP: ABNORMAL
IMM GRANULOCYTES # BLD AUTO: 0 K/UL (ref 0–0.04)
IMM GRANULOCYTES NFR BLD AUTO: 0 % (ref 0–0.5)
KETONES UR QL STRIP.AUTO: NEGATIVE MG/DL
LACTATE SERPL-SCNC: 1.6 MMOL/L (ref 0.4–2)
LACTATE SERPL-SCNC: 2.5 MMOL/L (ref 0.4–2)
LEUKOCYTE ESTERASE UR QL STRIP.AUTO: ABNORMAL
LYMPHOCYTES # BLD: 1.3 K/UL (ref 0.8–3.5)
LYMPHOCYTES NFR BLD: 17 % (ref 12–49)
MCH RBC QN AUTO: 42.9 PG (ref 26–34)
MCHC RBC AUTO-ENTMCNC: 33.5 G/DL (ref 30–36.5)
MCV RBC AUTO: 128 FL (ref 80–99)
MONOCYTES # BLD: 0.3 K/UL (ref 0–1)
MONOCYTES NFR BLD: 4 % (ref 5–13)
MUCOUS THREADS URNS QL MICRO: ABNORMAL /LPF
MUCOUS THREADS URNS QL MICRO: ABNORMAL /LPF
NEUTS SEG # BLD: 6 K/UL (ref 1.8–8)
NEUTS SEG NFR BLD: 79 % (ref 32–75)
NITRITE UR QL STRIP.AUTO: NEGATIVE
OTHER URINE MICRO,5051: 1
P-R INTERVAL, ECG05: 174 MS
PERFORMED BY, TECHID: ABNORMAL
PERFORMED BY, TECHID: ABNORMAL
PH UR STRIP: 5 [PH] (ref 5–8)
PLATELET # BLD AUTO: 153 K/UL (ref 150–400)
PMV BLD AUTO: 11.9 FL (ref 8.9–12.9)
POTASSIUM SERPL-SCNC: 4.3 MMOL/L (ref 3.5–5.1)
PROCALCITONIN SERPL-MCNC: 8.2 NG/ML
PROT SERPL-MCNC: 6.4 G/DL (ref 6.4–8.2)
PROT UR STRIP-MCNC: 100 MG/DL
Q-T INTERVAL, ECG07: 304 MS
QRS DURATION, ECG06: 76 MS
QTC CALCULATION (BEZET), ECG08: 450 MS
RBC # BLD AUTO: 3.22 M/UL (ref 4.1–5.7)
RBC #/AREA URNS HPF: ABNORMAL /HPF (ref 0–5)
RBC #/AREA URNS HPF: ABNORMAL /HPF (ref 0–5)
SODIUM SERPL-SCNC: 148 MMOL/L (ref 136–145)
SP GR UR REFRACTOMETRY: 1.02 (ref 1–1.03)
SPECIMEN EXP DATE BLD: NORMAL
TROPONIN I SERPL-MCNC: 0.19 NG/ML
TROPONIN I SERPL-MCNC: 0.21 NG/ML
UROBILINOGEN UR QL STRIP.AUTO: 0.1 EU/DL (ref 0.1–1)
VENTRICULAR RATE, ECG03: 132 BPM
WBC # BLD AUTO: 7.6 K/UL (ref 4.1–11.1)
WBC URNS QL MICRO: >100 /HPF (ref 0–4)
WBC URNS QL MICRO: >100 /HPF (ref 0–4)

## 2021-02-21 PROCEDURE — 82962 GLUCOSE BLOOD TEST: CPT

## 2021-02-21 PROCEDURE — 87086 URINE CULTURE/COLONY COUNT: CPT

## 2021-02-21 PROCEDURE — 74011000250 HC RX REV CODE- 250: Performed by: STUDENT IN AN ORGANIZED HEALTH CARE EDUCATION/TRAINING PROGRAM

## 2021-02-21 PROCEDURE — 71045 X-RAY EXAM CHEST 1 VIEW: CPT

## 2021-02-21 PROCEDURE — 3E033XZ INTRODUCTION OF VASOPRESSOR INTO PERIPHERAL VEIN, PERCUTANEOUS APPROACH: ICD-10-PCS | Performed by: INTERNAL MEDICINE

## 2021-02-21 PROCEDURE — 65270000029 HC RM PRIVATE

## 2021-02-21 PROCEDURE — 87077 CULTURE AEROBIC IDENTIFY: CPT

## 2021-02-21 PROCEDURE — 84145 PROCALCITONIN (PCT): CPT

## 2021-02-21 PROCEDURE — 87186 SC STD MICRODIL/AGAR DIL: CPT

## 2021-02-21 PROCEDURE — 87040 BLOOD CULTURE FOR BACTERIA: CPT

## 2021-02-21 PROCEDURE — 83605 ASSAY OF LACTIC ACID: CPT

## 2021-02-21 PROCEDURE — 74011250636 HC RX REV CODE- 250/636: Performed by: INTERNAL MEDICINE

## 2021-02-21 PROCEDURE — 83880 ASSAY OF NATRIURETIC PEPTIDE: CPT

## 2021-02-21 PROCEDURE — 85025 COMPLETE CBC W/AUTO DIFF WBC: CPT

## 2021-02-21 PROCEDURE — 93005 ELECTROCARDIOGRAM TRACING: CPT

## 2021-02-21 PROCEDURE — 99222 1ST HOSP IP/OBS MODERATE 55: CPT | Performed by: INTERNAL MEDICINE

## 2021-02-21 PROCEDURE — 84484 ASSAY OF TROPONIN QUANT: CPT

## 2021-02-21 PROCEDURE — 96374 THER/PROPH/DIAG INJ IV PUSH: CPT

## 2021-02-21 PROCEDURE — 81001 URINALYSIS AUTO W/SCOPE: CPT

## 2021-02-21 PROCEDURE — 74011000258 HC RX REV CODE- 258: Performed by: INTERNAL MEDICINE

## 2021-02-21 PROCEDURE — 99285 EMERGENCY DEPT VISIT HI MDM: CPT

## 2021-02-21 PROCEDURE — 80053 COMPREHEN METABOLIC PANEL: CPT

## 2021-02-21 PROCEDURE — 96375 TX/PRO/DX INJ NEW DRUG ADDON: CPT

## 2021-02-21 PROCEDURE — 74011250636 HC RX REV CODE- 250/636: Performed by: STUDENT IN AN ORGANIZED HEALTH CARE EDUCATION/TRAINING PROGRAM

## 2021-02-21 PROCEDURE — 86901 BLOOD TYPING SEROLOGIC RH(D): CPT

## 2021-02-21 RX ORDER — MAGNESIUM SULFATE 100 %
4 CRYSTALS MISCELLANEOUS AS NEEDED
Status: DISCONTINUED | OUTPATIENT
Start: 2021-02-21 | End: 2021-03-03 | Stop reason: HOSPADM

## 2021-02-21 RX ORDER — SODIUM CHLORIDE 0.9 % (FLUSH) 0.9 %
5-40 SYRINGE (ML) INJECTION AS NEEDED
Status: DISCONTINUED | OUTPATIENT
Start: 2021-02-21 | End: 2021-03-03 | Stop reason: HOSPADM

## 2021-02-21 RX ORDER — SODIUM CHLORIDE 0.9 % (FLUSH) 0.9 %
5-40 SYRINGE (ML) INJECTION EVERY 8 HOURS
Status: DISCONTINUED | OUTPATIENT
Start: 2021-02-21 | End: 2021-03-03 | Stop reason: HOSPADM

## 2021-02-21 RX ORDER — INSULIN LISPRO 100 [IU]/ML
INJECTION, SOLUTION INTRAVENOUS; SUBCUTANEOUS
Status: DISCONTINUED | OUTPATIENT
Start: 2021-02-21 | End: 2021-02-23

## 2021-02-21 RX ORDER — HEPARIN SODIUM 5000 [USP'U]/ML
5000 INJECTION, SOLUTION INTRAVENOUS; SUBCUTANEOUS EVERY 12 HOURS
Status: DISCONTINUED | OUTPATIENT
Start: 2021-02-21 | End: 2021-03-03 | Stop reason: HOSPADM

## 2021-02-21 RX ORDER — DEXAMETHASONE SODIUM PHOSPHATE 4 MG/ML
4 INJECTION, SOLUTION INTRA-ARTICULAR; INTRALESIONAL; INTRAMUSCULAR; INTRAVENOUS; SOFT TISSUE EVERY 8 HOURS
Status: DISCONTINUED | OUTPATIENT
Start: 2021-02-21 | End: 2021-02-24

## 2021-02-21 RX ORDER — DEXTROSE 50 % IN WATER (D50W) INTRAVENOUS SYRINGE
25-50 AS NEEDED
Status: DISCONTINUED | OUTPATIENT
Start: 2021-02-21 | End: 2021-03-03 | Stop reason: HOSPADM

## 2021-02-21 RX ORDER — INSULIN GLARGINE 100 [IU]/ML
15 INJECTION, SOLUTION SUBCUTANEOUS DAILY
Status: DISCONTINUED | OUTPATIENT
Start: 2021-02-22 | End: 2021-02-23

## 2021-02-21 RX ADMIN — CEFTRIAXONE 1 G: 1 INJECTION, POWDER, FOR SOLUTION INTRAMUSCULAR; INTRAVENOUS at 11:37

## 2021-02-21 RX ADMIN — DEXAMETHASONE SODIUM PHOSPHATE 4 MG: 4 INJECTION, SOLUTION INTRA-ARTICULAR; INTRALESIONAL; INTRAMUSCULAR; INTRAVENOUS; SOFT TISSUE at 21:39

## 2021-02-21 RX ADMIN — Medication 10 ML: at 16:30

## 2021-02-21 RX ADMIN — AZITHROMYCIN DIHYDRATE 500 MG: 500 INJECTION, POWDER, LYOPHILIZED, FOR SOLUTION INTRAVENOUS at 11:37

## 2021-02-21 RX ADMIN — SODIUM CHLORIDE 1000 ML: 9 INJECTION, SOLUTION INTRAVENOUS at 11:36

## 2021-02-21 RX ADMIN — Medication 10 ML: at 21:40

## 2021-02-21 RX ADMIN — SODIUM CHLORIDE 586 ML: 9 INJECTION, SOLUTION INTRAVENOUS at 11:36

## 2021-02-21 RX ADMIN — SODIUM CHLORIDE 1000 ML: 9 INJECTION, SOLUTION INTRAVENOUS at 11:35

## 2021-02-21 RX ADMIN — HEPARIN SODIUM 5000 UNITS: 5000 INJECTION INTRAVENOUS; SUBCUTANEOUS at 16:30

## 2021-02-21 RX ADMIN — PIPERACILLIN AND TAZOBACTAM 3.38 G: 3; .375 INJECTION, POWDER, LYOPHILIZED, FOR SOLUTION INTRAVENOUS at 21:39

## 2021-02-21 RX ADMIN — DEXAMETHASONE SODIUM PHOSPHATE 4 MG: 4 INJECTION, SOLUTION INTRA-ARTICULAR; INTRALESIONAL; INTRAMUSCULAR; INTRAVENOUS; SOFT TISSUE at 16:30

## 2021-02-21 NOTE — PROGRESS NOTES
Pt keeps throwing off clothes and blankets. Pt has tremors and blotching to legs. Pt VS checked and is stable. RN had second eyes and called family. Family states since having Covid, pt has been non-verbal and tremors. RN will continue to monitor.

## 2021-02-21 NOTE — ED NOTES
TRANSFER - OUT REPORT:    Verbal report given to sami(name) on Rebecca Baker  being transferred to (unit) for routine progression of care       Report consisted of patients Situation, Background, Assessment and   Recommendations(SBAR). Information from the following report(s) SBAR was reviewed with the receiving nurse. Lines:   Peripheral IV 02/21/21 Anterior;Proximal;Right Forearm (Active)       Peripheral IV 02/21/21 Anterior;Left;Proximal Forearm (Active)        Opportunity for questions and clarification was provided.       Patient transported with:   [employer]

## 2021-02-21 NOTE — PROGRESS NOTES
Pt has been non-verbal. Pt turns away from staff and avoid eye contact. Pt family contacted and said that Pt had been this way for past several days. RN will continue to monitor.

## 2021-02-21 NOTE — Clinical Note
Status[de-identified] INPATIENT [101]   Type of Bed: Medical [8]   Inpatient Hospitalization Certified Necessary for the Following Reasons: 3.  Patient receiving treatment that can only be provided in an inpatient setting (further clarification in H&P documentation)   Admitting Diagnosis: Sepsis [facility]) [[id number]]   Admitting Diagnosis: Pneumonia [635805]   Admitting Diagnosis: Renal failure [434298]   Admitting Physician: Johnathon Crawford   Attending Physician: Johnathon Crawford   Estimated Length of Stay: 2 Midnights   Discharge Plan[de-identified] Home with Office Follow-up

## 2021-02-21 NOTE — PROGRESS NOTES
Reason for Admission:   COVID, sepsis                  RUR Score:     High risk             PCP: First and Last name:  Ajit Faulkner MD   Name of Practice: n/a   Are you a current patient: Yes/No: yes   Approximate date of last visit: n/a   Can you participate in a virtual visit if needed: n/a    Do you (patient/family) have any concerns for transition/discharge?                    Plan for utilizing home health:   n/a    Current Advanced Directive/Advance Care Plan:      Healthcare Decision Maker:   Click here to complete [address] including selection of the Healthcare Decision Maker Relationship (ie \"Primary\")            Transition of Care Plan:        Back to [facility]  Documents uploaded in [place]

## 2021-02-21 NOTE — ED PROVIDER NOTES
EMERGENCY DEPARTMENT HISTORY AND PHYSICAL EXAM      Date: 2/21/2021  Patient Name: Horacio Rizo    History of Presenting Illness     Chief Complaint   Patient presents with    Altered mental status       History Provided By: Patient, EMS and Nursing Home/SNF/Rehab Center    HPI: Horacio Rizo, 80 y.o. male with a past medical history significant Diabetes, hypertension, CHF , A. fib presents to the ED with cc of altered mental status. Patient resident at [address] [place] [address]. As per EMS patient was noted to be hypotensive and altered this morning, on their arrival patient hypotensive systolic 49T nonverbal.  Patient has history of dementia. Patient recently admitted for fall, pain in the right hip to hospital.  As per EMS report patient was tested Covid positive yesterday. On presentation patient awake, alert, in no distress, nonverbal.    There are no other complaints, changes, or physical findings at this time.     PCP: Bradley Rush MD        Past History     Past Medical History:  Past Medical History:   Diagnosis Date    Atrial fibrillation ([address]     Diabetes mellitus type 2, uncomplicated ([address]     Hyperlipidemia     Hypertension     Hypothyroidism     [facility]     Prostatic hyperplasia     Spinal stenosis     Transient ischemic attack        Past Surgical History:  Past Surgical History:   Procedure Laterality Date    HX OTHER SURGICAL      None       Family History:  Family History   Problem Relation Age of Onset    Hypertension Mother     Stroke Mother     Lung Cancer Father     Melanoma Father        Social History:  Social History     Tobacco Use    Smoking status: Unknown If Ever Smoked   Substance Use Topics    Alcohol use: Never     Frequency: Never     Binge frequency: Never    Drug use: Never       Allergies:  No Known Allergies      Review of Systems     Review of Systems   Unable to perform ROS: Dementia       Physical Exam Physical Exam  Vitals signs and nursing note reviewed. Constitutional:       Appearance: Normal appearance. He is normal weight. HENT:      Head: Normocephalic and atraumatic. Nose: Nose normal.      Mouth/Throat:      Mouth: Mucous membranes are moist.   Eyes:      Extraocular Movements: Extraocular movements intact. Pupils: Pupils are equal, round, and reactive to light. Neck:      Musculoskeletal: Normal range of motion and neck supple. Cardiovascular:      Rate and Rhythm: Normal rate and regular rhythm. Pulses: Normal pulses. Heart sounds: Normal heart sounds. Pulmonary:      Breath sounds: Normal breath sounds. Abdominal:      General: Abdomen is flat. Bowel sounds are normal.      Palpations: Abdomen is soft. Musculoskeletal: Normal range of motion. General: No swelling or tenderness. Skin:     General: Skin is warm and dry. Capillary Refill: Capillary refill takes less than 2 seconds. Neurological:      General: No focal deficit present. Mental Status: He is alert. Mental status is at baseline. Cranial Nerves: No cranial nerve deficit. Sensory: No sensory deficit. Motor: No weakness. Comments: Patient awake, does not follow commands, no noted flacidity.      Psychiatric:         Mood and Affect: Mood normal.         Behavior: Behavior normal.         Diagnostic Study Results     Labs -     Recent Results (from the past 12 hour(s))   CBC WITH AUTOMATED DIFF    Collection Time: 02/21/21 10:27 AM   Result Value Ref Range    WBC 7.6 4.1 - 11.1 K/uL    RBC 3.22 (L) 4.10 - 5.70 M/uL    HGB 13.8 12.1 - 17.0 g/dL    HCT 41.2 36.6 - 50.3 %    .0 (H) 80.0 - 99.0 FL    MCH 42.9 (H) 26.0 - 34.0 PG    MCHC 33.5 30.0 - 36.5 g/dL    RDW 14.8 (H) 11.5 - 14.5 %    PLATELET 081 285 - 543 K/uL    MPV 11.9 8.9 - 12.9 FL    NEUTROPHILS 79 (H) 32 - 75 %    LYMPHOCYTES 17 12 - 49 %    MONOCYTES 4 (L) 5 - 13 %    EOSINOPHILS 0 0 - 7 %    BASOPHILS 0 0 - 1 %    IMMATURE GRANULOCYTES 0 0.0 - 0.5 %    ABS. NEUTROPHILS 6.0 1.8 - 8.0 K/UL    ABS. LYMPHOCYTES 1.3 0.8 - 3.5 K/UL    ABS. MONOCYTES 0.3 0.0 - 1.0 K/UL    ABS. EOSINOPHILS 0.0 0.0 - 0.4 K/UL    ABS. BASOPHILS 0.0 0.0 - 0.1 K/UL    ABS. IMM. GRANS. 0.0 0.00 - 0.04 K/UL    DF AUTOMATED     METABOLIC PANEL, COMPREHENSIVE    Collection Time: 02/21/21 10:27 AM   Result Value Ref Range    Sodium 148 (H) 136 - 145 mmol/L    Potassium 4.3 3.5 - 5.1 mmol/L    Chloride 116 (H) 97 - 108 mmol/L    CO2 22 21 - 32 mmol/L    Anion gap 10 5 - 15 mmol/L    Glucose 170 (H) 65 - 100 mg/dL     (H) 6 - 20 mg/dL    Creatinine 5.29 (H) 0.70 - 1.30 mg/dL    BUN/Creatinine ratio 22 (H) 12 - 20      GFR est AA 13 (L) >60 ml/min/1.73m2    GFR est non-AA 10 (L) >60 ml/min/1.73m2    Calcium 8.1 (L) 8.5 - 10.1 mg/dL    Bilirubin, total 0.4 0.2 - 1.0 mg/dL    AST (SGOT) 53 (H) 15 - 37 U/L    ALT (SGPT) 31 12 - 78 U/L    Alk.  phosphatase 69 45 - 117 U/L    Protein, total 6.4 6.4 - 8.2 g/dL    Albumin 2.0 (L) 3.5 - 5.0 g/dL    Globulin 4.4 (H) 2.0 - 4.0 g/dL    A-G Ratio 0.5 (L) 1.1 - 2.2     LACTIC ACID    Collection Time: 02/21/21 10:27 AM   Result Value Ref Range    Lactic acid 2.5 (HH) 0.4 - 2.0 mmol/L   URINALYSIS W/ RFLX MICROSCOPIC    Collection Time: 02/21/21 10:27 AM   Result Value Ref Range    Color [place]      Appearance Turbid (A) Clear      Specific gravity 1.017 1.003 - 1.030      pH (UA) 5.0 5.0 - 8.0      Protein 100 (A) Negative mg/dL    Glucose Negative Negative mg/dL    Ketone Negative Negative mg/dL    Bilirubin Negative Negative      Blood Moderate (A) Negative      Urobilinogen 0.1 0.1 - 1.0 EU/dL    Nitrites Negative Negative      Leukocyte Esterase Large (A) Negative      WBC >100 (H) 0 - 4 /hpf    RBC 10-20 0 - 5 /hpf    Bacteria 4+ (A) Negative /hpf    Mucus 2+ /lpf    Other Urine Micro 1     TROPONIN I    Collection Time: 02/21/21 10:27 AM   Result Value Ref Range    Troponin-I, Qt. 0.21 (H) <0.05 ng/mL BNP    Collection Time: 02/21/21 10:27 AM   Result Value Ref Range    NT pro-BNP 4,270 (H) <450 pg/mL   PROCALCITONIN    Collection Time: 02/21/21 10:27 AM   Result Value Ref Range    Procalcitonin 8.20 (H) 0 ng/mL   TYPE & SCREEN    Collection Time: 02/21/21 10:27 AM   Result Value Ref Range    Crossmatch Expiration 02/24/2021,2359     ABO/Rh(D) B Positive     Antibody screen Negative     Comment CERNER HX:  B POS    URINE MICROSCOPIC    Collection Time: 02/21/21 10:27 AM   Result Value Ref Range    WBC >100 (H) 0 - 4 /hpf    RBC 10-20 0 - 5 /hpf    Bacteria 4+ (A) Negative /hpf    Mucus 2+ (A) Negative /lpf   LACTIC ACID    Collection Time: 02/21/21 12:50 PM   Result Value Ref Range    Lactic acid 1.6 0.4 - 2.0 mmol/L   TROPONIN I    Collection Time: 02/21/21 12:50 PM   Result Value Ref Range    Troponin-I, Qt. 0.19 (H) <0.05 ng/mL       Radiologic Studies -   [unfilled]  CT Results  (Last 48 hours)    None        CXR Results  (Last 48 hours)               02/21/21 1026  XR CHEST PORT Final result    Impression:  New left lung opacities, nonspecific, but could represent an infectious process   in the appropriate clinical setting. Asymmetric edema could appear similar,   though is considered less likely. Narrative:  Examination: XR CHEST PORT        History: sob       Comparison: Chest radiograph 2/3/2021       FINDINGS:       Single frontal portable view of the chest. There are new patchy opacities in the   left lung. No pneumothorax or significant pleural effusion is evident, though   difficult to completely exclude a layering left pleural effusion given the other   opacities. The cardiac silhouette is upper limits of normal for size. Atherosclerosis and tortuosity of the thoracic aorta. Right upper extremity PICC   tip is obscured by overlying cardiac monitoring leads. Osseous structures appear   unchanged.                  Medical Decision Making and ED Course   I am the first provider for this patient. I reviewed the vital signs, available nursing notes, past medical history, past surgical history, family history and social history. Vital Signs-Reviewed the patient's vital signs. Patient Vitals for the past 12 hrs:   Temp Pulse Resp BP SpO2   02/21/21 1431 (P) 98.1 °F (36.7 °C) (P) 97 (P) 24 (P) 131/78 (P) 96 %   02/21/21 1352  90 (!) 32 124/82 97 %   02/21/21 1334 100 °F (37.8 °C)       02/21/21 1307  (!) 101 25 (!) 104/59 97 %   02/21/21 1141 99.5 °F (37.5 °C) (!) 105 23 95/82 98 %   02/21/21 1024    95/69    02/21/21 1011  (!) 136 (!) 34 (!) 53/43    02/21/21 1002 (!) 101.5 °F (38.6 °C) (!) 131  (!) 69/54    02/21/21 0959  (!) 127 24 (!) 78/34        EKG interpretation: (Preliminary)  Atrial fibrillation 132, no ST elevations, left axis deviation, patient has poor baseline, shivering    Records Reviewed: Nursing Notes and Old Medical Records    The patient presents with altered mental status with a differential diagnosis of sepsis, dehydration, COVID-19 infection, pneumonia      Provider Notes (Medical Decision Making):     MDM   80year-old male, past medical history of dementia, atrial fibrillation, presents from [address] [place] [address] for altered mentation, hypotension. On presentation patient febrile to 101.5, tachycardic 130s, initial blood pressure was 78/34, saturations 95 to 98% on 4 L nasal cannula. IV established by EMS, sepsis protocol initiated, IV fluid boluses ordered. Of note patient was tested positive for COVID-19 yesterday. ED Course:   Initial assessment performed. The patients presenting problems have been discussed, and they are in agreement with the care plan formulated and outlined with them. I have encouraged them to ask questions as they arise throughout their visit.     ED Course as of Feb 21 1446   Sun Feb 21, 2021   1134 Patient's vital signs improved after IV hydration, last blood pressure 95/69, patient heart rate decreased to 105-110. Lab work reviewed, [employer] panel significant for acute renal failure,  creatinine 5.29, as of last admission creatinine was normal, potassium normal 4.3Troponin elevated at 0.21, patient CBC shows no leukocytosis, no anemia, procalcitonin elevated 8. 2. Chest x-ray reviewed, new left-sided infiltrate, suggestive of pneumonia. Urinalysis also shows +4 bacteria more than 100 white blood cells, will treat patient with Rocephin and azithromycin, admit to hospital for sepsis, pneumonia, UTI, acute renal failure, COVID-19 infection. Dr. Jeanne Moses covering Dr. Juan Earl, will page Dr. Jeanne Moses    [PZ]   [phone] with Dr. Jeanne Moses, will admit patient for sepsis. [PZ]      ED Course User Index  [PZ] Bridget Linares MD         Procedures       Behzad Wallace MD  Procedures               CRITICAL CARE NOTE :  11:24 AM  Amount of Critical Care Time: __60__(minutes)__    IMPENDING DETERIORATION -Respiratory and Cardiovascular  ASSOCIATED RISK FACTORS - Hypotension, Shock and Hypoxia  MANAGEMENT- Bedside Assessment and Supervision of Care  INTERPRETATION -  Xrays, ECG and Blood Pressure  INTERVENTIONS - hemodynamic mngmt and vascular control  CASE REVIEW - Hospitalist/Intensivist  TREATMENT RESPONSE -Improved  PERFORMED BY - Self    NOTES   :  I have spent critical care time involved in lab review, consultations with specialist, family decision- making, bedside attention and documentation. This time excludes time spent in any separate billed procedures. During this entire length of time I was immediately available to the patient . Behzad Wallace MD        Disposition       Admitted      Diagnosis     Clinical Impression:   1. Sepsis, due to unspecified organism, unspecified whether acute organ dysfunction present ([address]    2. Aspiration pneumonia of lower lobe, unspecified aspiration pneumonia type, unspecified laterality ([address]    3.  Acute renal failure, unspecified acute renal failure type ([address] Attestations:    Holly Serrano MD    Please note that this dictation was completed with [employer], the computer voice recognition software. Quite often unanticipated grammatical, syntax, homophones, and other interpretive errors are inadvertently transcribed by the computer software. Please disregard these errors. Please excuse any errors that have escaped final proofreading. Thank you.

## 2021-02-21 NOTE — PROGRESS NOTES
Pt arrived with red blanchable sacrum. Excoriations to scrotom, and a healing laceration to right anterior knee. Meplex put on sacrum, z-guard on excoriation. Second RN assessment done by [place].

## 2021-02-22 ENCOUNTER — APPOINTMENT (OUTPATIENT)
Dept: ULTRASOUND IMAGING | Age: 86
DRG: 871 | End: 2021-02-22
Attending: INTERNAL MEDICINE
Payer: MEDICARE

## 2021-02-22 LAB
GLUCOSE BLD STRIP.AUTO-MCNC: 171 MG/DL (ref 65–100)
GLUCOSE BLD STRIP.AUTO-MCNC: 195 MG/DL (ref 65–100)
GLUCOSE BLD STRIP.AUTO-MCNC: 230 MG/DL (ref 65–100)
GLUCOSE BLD STRIP.AUTO-MCNC: 249 MG/DL (ref 65–100)
MRSA DNA SPEC QL NAA+PROBE: NOT DETECTED
PERFORMED BY, TECHID: ABNORMAL

## 2021-02-22 PROCEDURE — 77010033678 HC OXYGEN DAILY

## 2021-02-22 PROCEDURE — 74011250636 HC RX REV CODE- 250/636: Performed by: INTERNAL MEDICINE

## 2021-02-22 PROCEDURE — 74011250637 HC RX REV CODE- 250/637: Performed by: INTERNAL MEDICINE

## 2021-02-22 PROCEDURE — 82962 GLUCOSE BLOOD TEST: CPT

## 2021-02-22 PROCEDURE — 76770 US EXAM ABDO BACK WALL COMP: CPT

## 2021-02-22 PROCEDURE — 74011000258 HC RX REV CODE- 258: Performed by: INTERNAL MEDICINE

## 2021-02-22 PROCEDURE — 65610000006 HC RM INTENSIVE CARE

## 2021-02-22 PROCEDURE — 74011000250 HC RX REV CODE- 250

## 2021-02-22 PROCEDURE — 99232 SBSQ HOSP IP/OBS MODERATE 35: CPT | Performed by: INTERNAL MEDICINE

## 2021-02-22 PROCEDURE — 87641 MR-STAPH DNA AMP PROBE: CPT

## 2021-02-22 PROCEDURE — 74011636637 HC RX REV CODE- 636/637: Performed by: INTERNAL MEDICINE

## 2021-02-22 RX ORDER — NOREPINEPHRINE BITARTRATE/D5W 8 MG/250ML
.5-16 PLASTIC BAG, INJECTION (ML) INTRAVENOUS
Status: DISCONTINUED | OUTPATIENT
Start: 2021-02-22 | End: 2021-03-03

## 2021-02-22 RX ORDER — MIDODRINE HYDROCHLORIDE 5 MG/1
10 TABLET ORAL
Status: DISCONTINUED | OUTPATIENT
Start: 2021-02-22 | End: 2021-02-22

## 2021-02-22 RX ORDER — MIDODRINE HYDROCHLORIDE 5 MG/1
5 TABLET ORAL
Status: DISCONTINUED | OUTPATIENT
Start: 2021-02-22 | End: 2021-02-28

## 2021-02-22 RX ORDER — NOREPINEPHRINE BITARTRATE/D5W 8 MG/250ML
PLASTIC BAG, INJECTION (ML) INTRAVENOUS
Status: COMPLETED
Start: 2021-02-22 | End: 2021-02-22

## 2021-02-22 RX ORDER — SODIUM CHLORIDE 450 MG/100ML
50 INJECTION, SOLUTION INTRAVENOUS CONTINUOUS
Status: DISCONTINUED | OUTPATIENT
Start: 2021-02-22 | End: 2021-02-23

## 2021-02-22 RX ADMIN — INSULIN LISPRO 3 UNITS: 100 INJECTION, SOLUTION INTRAVENOUS; SUBCUTANEOUS at 12:25

## 2021-02-22 RX ADMIN — SODIUM CHLORIDE 250 ML: 9 INJECTION, SOLUTION INTRAVENOUS at 00:30

## 2021-02-22 RX ADMIN — SODIUM CHLORIDE 50 ML/HR: 4.5 INJECTION, SOLUTION INTRAVENOUS at 14:40

## 2021-02-22 RX ADMIN — INSULIN LISPRO 2 UNITS: 100 INJECTION, SOLUTION INTRAVENOUS; SUBCUTANEOUS at 16:58

## 2021-02-22 RX ADMIN — SODIUM CHLORIDE 250 ML: 9 INJECTION, SOLUTION INTRAVENOUS at 00:50

## 2021-02-22 RX ADMIN — Medication 8000 MCG: at 03:41

## 2021-02-22 RX ADMIN — HEPARIN SODIUM 5000 UNITS: 5000 INJECTION INTRAVENOUS; SUBCUTANEOUS at 06:21

## 2021-02-22 RX ADMIN — FAMOTIDINE 20 MG: 10 INJECTION INTRAVENOUS at 14:41

## 2021-02-22 RX ADMIN — DEXAMETHASONE SODIUM PHOSPHATE 4 MG: 4 INJECTION, SOLUTION INTRA-ARTICULAR; INTRALESIONAL; INTRAMUSCULAR; INTRAVENOUS; SOFT TISSUE at 22:00

## 2021-02-22 RX ADMIN — HEPARIN SODIUM 5000 UNITS: 5000 INJECTION INTRAVENOUS; SUBCUTANEOUS at 14:41

## 2021-02-22 RX ADMIN — AZITHROMYCIN DIHYDRATE 500 MG: 500 INJECTION, POWDER, LYOPHILIZED, FOR SOLUTION INTRAVENOUS at 09:04

## 2021-02-22 RX ADMIN — Medication 10 ML: at 08:15

## 2021-02-22 RX ADMIN — DEXAMETHASONE SODIUM PHOSPHATE 4 MG: 4 INJECTION, SOLUTION INTRA-ARTICULAR; INTRALESIONAL; INTRAMUSCULAR; INTRAVENOUS; SOFT TISSUE at 14:41

## 2021-02-22 RX ADMIN — DEXAMETHASONE SODIUM PHOSPHATE 4 MG: 4 INJECTION, SOLUTION INTRA-ARTICULAR; INTRALESIONAL; INTRAMUSCULAR; INTRAVENOUS; SOFT TISSUE at 06:21

## 2021-02-22 RX ADMIN — PIPERACILLIN AND TAZOBACTAM 3.38 G: 3; .375 INJECTION, POWDER, LYOPHILIZED, FOR SOLUTION INTRAVENOUS at 09:05

## 2021-02-22 RX ADMIN — INSULIN GLARGINE 15 UNITS: 100 INJECTION, SOLUTION SUBCUTANEOUS at 09:30

## 2021-02-22 RX ADMIN — PIPERACILLIN AND TAZOBACTAM 3.38 G: 3; .375 INJECTION, POWDER, LYOPHILIZED, FOR SOLUTION INTRAVENOUS at 21:00

## 2021-02-22 RX ADMIN — Medication 10 ML: at 14:40

## 2021-02-22 RX ADMIN — Medication 10 ML: at 22:00

## 2021-02-22 RX ADMIN — INSULIN LISPRO 3 UNITS: 100 INJECTION, SOLUTION INTRAVENOUS; SUBCUTANEOUS at 09:30

## 2021-02-22 RX ADMIN — SODIUM CHLORIDE 1500 MG: 9 INJECTION, SOLUTION INTRAVENOUS at 14:56

## 2021-02-22 NOTE — CONSULTS
Consult    NAME: Devonte Benjamin   :  1934   MRN:  [id number]     Date/Time:  2021 10:35 AM    Patient PCP: Gilma Harley MD  ________________________________________________________________________     Problem List:    Atrial fibrillation ([address]      Diabetes mellitus type 2, uncomplicated [facility]      Hyperlipidemia      Hypertension      Hypothyroidism      Polycythemia vera ([facility]      Prostatic hyperplasia      Spinal stenosis      Transient ischemic attack          Assessment/Plan:   -Patient is lying comfortably in the bed and did not respond to vocal command but arousable.  -Cardiology consult was invited secondary to atrial fibrillation and borderline increased troponin  -Ejection fraction of 79% per echocardiogram earlier this month dated 2021  -Increase troponin and hyper dynamic ejection fraction is probably secondary to underlying sepsis. -Patient did not communicate with me so unable to obtain direct history. -Multiple coronary artery disease risk factors including hypertension dyslipidemia and diabetes mellitus.  -Based on my overall cardiac assessment I will be conservative and continue to watch him while patient is in the hospital along with the team and make further cardiac management decision as clinically indicated. -For now continue current medical management with no further cardiovascular testing as it would not change my cardiac management    Thank you for consultation and letting me participate in the care of your patient. []        High complexity decision making was performed      Patient Active Problem List   Diagnosis Code    Fall W19. XXXA    Weakness R53.1    Pneumonia J18.9    Renal failure N19    Sepsis (HCC) A41.9        Subjective:   CHIEF COMPLAINT:     HISTORY OF PRESENT ILLNESS:     Rosibel Alvarez is a 80 y.o.    male who was admitted to the hospital with altered mental status and cardiology consult was ordered secondary to increased troponin and atrial fibrillation. At the time of my examination patient was lying comfortably in the bed and did not communicated with me so history is obtained from the chart. Normal ejection fraction of 79% and borderline increased troponin could be secondary to multiple etiology including but not limited to sepsis. Based on my overall cardiac assessment I will be conservative and continue to watch him while he is in the ICU along with the team. My goal would be to control rate at this time. We were asked to consult for work up and evaluation of the above problems.      Past Medical History:   Diagnosis Date    Atrial fibrillation ([address]     Diabetes mellitus type [facility], uncomplicated [facility]     Hyperlipidemia     Hypertension     Hypothyroidism     Polycythemia vera ([facility]     Prostatic hyperplasia     Spinal stenosis     Transient ischemic attack       Past Surgical History:   Procedure Laterality Date    HX OTHER SURGICAL      None     No Known Allergies   Meds:  See below  Social History     Tobacco Use    Smoking status: Unknown If Ever Smoked   Substance Use Topics    Alcohol use: Never     Frequency: Never     Binge frequency: Never      Family History   Problem Relation Age of Onset    Hypertension Mother     Stroke Mother     Lung Cancer Father     Melanoma Father        REVIEW OF SYSTEMS:     [x]         Unable to obtain  ROS due to ---   []         Total of 12 systems reviewed as follows:    Constitutional: negative fever, negative chills, negative weight loss  Eyes:   negative visual changes  ENT:   negative sore throat, tongue or lip swelling  Respiratory:  negative cough, negative dyspnea  Cards:  negative for chest pain, palpitations, lower extremity edema  GI:   negative for nausea, vomiting, diarrhea, and abdominal pain  Genitourinary: negative for frequency, dysuria  Integument:  negative for rash   Hematologic:  negative for easy bruising and gum/nose bleeding  Musculoskel: negative for myalgias,  back pain  Neurological:  negative for headaches, dizziness, vertigo, weakness  Behavl/Psych: negative for feelings of anxiety, depression     Pertinent Positives include :    Objective:      Physical Exam:    Last 24hrs VS reviewed since prior progress note. Most recent are:    Visit Vitals  /62 (BP 1 Location: Left upper arm, BP Patient Position: At rest)   Pulse 87   Temp 96.9 °F (36.1 °C)   Resp (!) 34   Ht 6' (1.829 m)   Wt 74.9 kg (165 lb 2 oz)   SpO2 98%   BMI 22.39 kg/m²       Intake/Output Summary (Last 24 hours) at 2/22/2021 1035  Last data filed at 2/22/2021 0645  Gross per 24 hour   Intake    Output 125 ml   Net -125 ml        General Appearance: Well developed, well nourished, alert & oriented x 3,    no acute distress. Ears/Nose/Mouth/Throat: Pupils equal and round, Hearing grossly normal.  Neck: Supple. JVP within normal limits. Carotids good upstrokes, with no bruit. Chest: Lungs clear to auscultation bilaterally. Cardiovascular: Regular rate and rhythm, S1S2 normal, no murmur, rubs, gallops. Abdomen: Soft, non-tender, bowel sounds are active. No organomegaly. Extremities: No edema bilaterally. Femoral pulses +2, Distal Pulses +1. Skin: Warm and dry. Neuro: CN II-XII grossly intact, Strength and sensation grossly intact. []         Post-cath site without hematoma, bruit, tenderness, or thrill. Distal pulses intact. XR CHEST PORT   Final Result   New left lung opacities, nonspecific, but could represent an infectious process   in the appropriate clinical setting. Asymmetric edema could appear similar,   though is considered less likely. US RETROPERITONEUM COMP    (Results Pending)        Data:      Telemetry:    EKG:  []  No new EKG for review. Prior to Admission medications    Medication Sig Start Date End Date Taking? Authorizing Provider   metoprolol succinate (TOPROL-XL) 50 mg XL tablet Take 1 Tab by mouth daily. Provider, Historical   atorvastatin (LIPITOR) 20 mg tablet Take 1 Tab by mouth daily. Provider, Historical   aspirin delayed-release 81 mg tablet Take 1 Tab by mouth daily. Provider, Historical   lisinopriL (PRINIVIL, ZESTRIL) 40 mg tablet Take 1 Tab by mouth daily. Provider, Historical   finasteride (PROSCAR) 5 mg tablet Take 1 Tab by mouth daily. Provider, Historical   alogliptin (Nesina) 25 mg tablet Take 1 Tab by mouth daily as needed. Provider, Historical   digoxin (LANOXIN) 0.125 mg tablet Take 1 Tab by mouth daily. Provider, Historical   levothyroxine (SYNTHROID) 50 mcg tablet Take 50 mcg by mouth Daily (before breakfast). Provider, Historical   glyBURIDE (DIABETA) 5 mg tablet Take 5 mg by mouth Daily (before breakfast). Provider, Historical   metFORMIN (GLUCOPHAGE) 500 mg tablet Take 500 mg by mouth two (2) times a day. Provider, Historical   SITagliptin (Januvia) 100 mg tablet Take 100 mg by mouth daily. Provider, Historical   dilTIAZem ER (DILACOR XR) 240 mg capsule Take 240 mg by mouth daily. Provider, Historical   dabigatran etexilate (Pradaxa) 75 mg capsule Take 75 mg by mouth every twelve (12) hours. Provider, Historical   tamsulosin (FLOMAX) 0.4 mg capsule Take 0.4 mg by mouth daily. Provider, Historical   tolterodine ER (DETROL LA) 4 mg ER capsule Take 4 mg by mouth daily. Provider, Historical   hydroxyurea (HYDREA) 500 mg capsule Take 500 mg by mouth two (2) times a day.     Provider, Historical       Recent Results (from the past 24 hour(s))   LACTIC ACID    Collection Time: 02/21/21 12:50 PM   Result Value Ref Range    Lactic acid 1.6 0.4 - 2.0 mmol/L   TROPONIN I    Collection Time: 02/21/21 12:50 PM   Result Value Ref Range    Troponin-I, Qt. 0.19 (H) <0.05 ng/mL   GLUCOSE, POC    Collection Time: 02/21/21  4:23 PM   Result Value Ref Range    Glucose (POC) 143 (H) 65 - 100 mg/dL    Performed by Radha Hamm, POC    Collection Time: 02/21/21 8:57 PM   Result Value Ref Range    Glucose (POC) 183 (H) 65 - 100 mg/dL    Performed by ZACHERY MAGALLANES    GLUCOSE, POC    Collection Time: 02/22/21  9:23 AM   Result Value Ref Range    Glucose (POC) 230 (H) 65 - 100 mg/dL    Performed by MONAE He MD

## 2021-02-22 NOTE — PROGRESS NOTES
FOUR EYES SKIN ASSESSMENT:  PT HAS MULTIPLE BRUISES ON BOTH ARMS; AN OLD SKIN TEAR ON RIGHT ELBOW. HE HAS REDNESS TO INNER THIGHS AND TESTICLES. REDNESS (BLANCHABLE) TO LOWER BACK AND SACRAL AREA. HE HAS A SKIN TEAR ON LEFT LOWER LEG. BOTH HEELS REDDENED AND LEFT IS SLIGHTLY BOGGY.

## 2021-02-22 NOTE — PROGRESS NOTES
Progress Note    Patient: Nori Gonzalez MRN: [id number]  SSN: [id number]    YOB: 1934  Age: 80 y.o. Sex: male      Admit Date: 2/21/2021    LOS: 1 day     Subjective:     Patient was transferred from the floor to ICU for septic shock started on IVF pressors subsequently weaned off    Objective:     Vitals:    02/22/21 1400 02/22/21 1500 02/22/21 1600 02/22/21 1700   BP: (!) 129/27 (!) 123/49 (!) 154/48 (!) 151/98   Pulse:       Resp: 27 27 26 23   Temp:  97.4 °F (36.3 °C)     SpO2: 100% 100% 100% 100%   Weight:       Height:            Intake and Output:  Current Shift: No intake/output data recorded. Last three shifts: 02/20 1901 - 02/22 0700  In: -   Out: 125 [Urine:125]    Physical Exam:   Lethargic    Eyes:   Lethargic   Ears:  Normal TMs and external ear canals both ears. Nose: Nares normal. Septum midline. Mucosa normal. No drainage or sinus tenderness. Mouth/Throat: Lips, mucosa, and tongue normal. Teeth and gums normal.   Neck: Supple, symmetrical, trachea midline, no adenopathy, thyroid: no enlargment/tenderness/nodules, no carotid bruit and no JVD. Back:   Symmetric, no curvature. ROM normal. No CVA tenderness. Lungs:   Clear to auscultation bilaterally. Heart:  Regular rate and rhythm, S1, S2 normal, no murmur, click, rub or gallop. Abdomen:   Soft, non-tender. Bowel sounds normal. No masses,  No organomegaly. Extremities: lethargic atraumatic, no cyanosis or edema. Pulses: 2+ and symmetric all extremities. Skin: Skin color, texture, turgor normal. No rashes or lesions   Lymph nodes: Cervical, supraclavicular, and axillary nodes normal.   Neurologic: lethargic       Lab/Data Review: All lab results for the last 24 hours reviewed.      Recent Results (from the past 24 hour(s))   GLUCOSE, POC    Collection Time: 02/21/21  8:57 PM   Result Value Ref Range    Glucose (POC) 183 (H) 65 - 100 mg/dL    Performed by [facility]    MRSA SCREEN - PCR (NASAL) Collection Time: 02/22/21  3:50 AM   Result Value Ref Range    MRSA by PCR, Nasal Not Detected Not Detected     GLUCOSE, POC    Collection Time: 02/22/21  9:23 AM   Result Value Ref Range    Glucose (POC) 230 (H) 65 - 100 mg/dL    Performed by Prem Aquino, POC    Collection Time: 02/22/21 11:41 AM   Result Value Ref Range    Glucose (POC) 249 (H) 65 - 100 mg/dL    Performed by Haley Herrmann    GLUCOSE, POC    Collection Time: 02/22/21  3:57 PM   Result Value Ref Range    Glucose (POC) 195 (H) 65 - 100 mg/dL    Performed by Haley Herrmann          Assessment:     Active Problems:    Pneumonia (2/21/2021)      Renal failure (2/21/2021)      Sepsis ([address] (2/21/2021)    Aspiration pneumonia  Septic shock  Acute kidney injury    Plan:     Continue IV fluids, IV antibiotics      Critical care time of 45 minutes    Signed By: Ricardo Wheeler MD     February 22, 2021

## 2021-02-22 NOTE — CONSULTS
Consult Date: 2/22/2021    IP CONSULT TO NEPHROLOGY  Consult performed by: Lissett Burks MD  Consult ordered by: Brooke Zuniga MD          Subjective   History of presenting illness:  Patient is a 49-year-old  male with a history of diabetes mellitus, chronic atrial fibrillation, BPH, PCV who was admitted to the hospital for COVID-19 pneumonia. As of now he is dependent on oxygen and frankly confused. Nonverbal to me. All information is gathered from review of chart. Discussed with ICU RN.   Past Medical History:   Diagnosis Date    Atrial fibrillation ([address]     Diabetes mellitus type 2, uncomplicated ([address]     Hyperlipidemia     Hypertension     Hypothyroidism     Polycythemia vera ([facility] [address]     Prostatic hyperplasia     Spinal stenosis     Transient ischemic attack       Past Surgical History:   Procedure Laterality Date    HX OTHER SURGICAL      None     Family History   Problem Relation Age of Onset    Hypertension Mother     Stroke Mother     Lung Cancer Father     Melanoma Father       Social History     Tobacco Use    Smoking status: Unknown If Ever Smoked   Substance Use Topics    Alcohol use: Never     Frequency: Never     Binge frequency: Never       Current Facility-Administered Medications   Medication Dose Route Frequency Provider Last Rate Last Admin    NOREPINephrine (LEVOPHED) 8 mg in 5% dextrose 250mL (32 mcg/mL) infusion  0.5-16 mcg/min IntraVENous TITRATE Christiano LENZ MD   Stopped at 02/22/21 0800    pantoprazole (PROTONIX) 40 mg in 0.9% sodium chloride 10 mL injection  40 mg IntraVENous DAILY Joselito Haile MD        sodium chloride (NS) flush 5-40 mL  5-40 mL IntraVENous Q8H Candelario Alvarez MD   10 mL at 02/22/21 0815    sodium chloride (NS) flush 5-40 mL  5-40 mL IntraVENous PRN Christiano LENZ MD        azithromycin (ZITHROMAX) 500 mg in 0.9% sodium chloride 250 mL (VIAL-MATE)  500 mg IntraVENous DAILY Christiano LENZ MD   500 mg at 02/22/21 7147    heparin (porcine) injection 5,000 Units  5,000 Units SubCUTAneous Q12H Shay LENZ MD   5,000 Units at 02/22/21 0427    dexamethasone (DECADRON) 4 mg/mL injection 4 mg  4 mg IntraVENous Q8H Shay LENZ MD   4 mg at 02/22/21 9272    glucose chewable tablet 16 g  4 Tab Oral PRN Yaw Ramos MD        dextrose (D50W) injection syrg 12.5-25 g  25-50 mL IntraVENous PRN Yaw Ramos MD        glucagon (GLUCAGEN) injection 1 mg  1 mg IntraMUSCular PRN Yaw Ramos MD        insulin glargine (LANTUS) injection 15 Units  15 Units SubCUTAneous DAILY Yaw Ramos MD   15 Units at 02/22/21 0930    insulin lispro (HUMALOG) injection   SubCUTAneous TIDAC Yaw Ramos MD   3 Units at 02/22/21 1225    piperacillin-tazobactam (ZOSYN) 3.375 g in 0.9% sodium chloride (MBP/ADV) 100 mL MBP  3.375 g IntraVENous Q12H Holly Bustamante  mL/hr at 02/22/21 0905 3.375 g at 02/22/21 0460        Review of Systems   Unable to perform ROS: Patient nonverbal       Objective     Vital signs for last 24 hours:  Visit Vitals  BP (!) 139/53 (BP 1 Location: Left upper arm, BP Patient Position: At rest)   Pulse 90   Temp 96.9 °F (36.1 °C)   Resp 24   Ht 6' (1.829 m)   Wt 74.9 kg (165 lb 2 oz)   SpO2 98%   BMI 22.39 kg/m²       Intake/Output this shift:  Current Shift: No intake/output data recorded. Last 3 Shifts: 02/20 1901 - 02/22 0700  In: -   Out: 125 [Urine:125]  Physical Exam   Constitutional: He appears well-developed and well-nourished. HENT:   Head: Normocephalic. Pulmonary/Chest: Effort normal and breath sounds normal.   Abdominal: Soft. Bowel sounds are normal.   Skin: Skin is warm and dry.    Appears to be obtunded  On oxygen by nasal cannula    Data Review:   Recent Results (from the past 24 hour(s))   LACTIC ACID    Collection Time: 02/21/21 12:50 PM   Result Value Ref Range    Lactic acid 1.6 0.4 - 2.0 mmol/L   TROPONIN I    Collection Time: 02/21/21 12:50 PM   Result Value Ref Range    Troponin-I, Qt. 0.19 (H) <0.05 ng/mL   GLUCOSE, POC    Collection Time: 02/21/21  4:23 PM   Result Value Ref Range    Glucose (POC) 143 (H) 65 - 100 mg/dL    Performed by Radha Hamm, POC    Collection Time: 02/21/21  8:57 PM   Result Value Ref Range    Glucose (POC) 183 (H) 65 - 100 mg/dL    Performed by Ehsan Leone    GLUCOSE, POC    Collection Time: 02/22/21  9:23 AM   Result Value Ref Range    Glucose (POC) 230 (H) 65 - 100 mg/dL    Performed by Khris Arambula, POC    Collection Time: 02/22/21 11:41 AM   Result Value Ref Range    Glucose (POC) 249 (H) 65 - 100 mg/dL    Performed by Purnima Subramanian          XR CHEST PORT   Final Result   New left lung opacities, nonspecific, but could represent an infectious process   in the appropriate clinical setting. Asymmetric edema could appear similar,   though is considered less likely. US RETROPERITONEUM COMP    (Results Pending)   XR CHEST PORT    (Results Pending)        Patient Active Problem List   Diagnosis Code    Fall W19. XXXA    Weakness R53.1    Pneumonia J18.9    Renal failure N19    Sepsis ([facility] [address] A41.9        DIAGNOSES:  1. Acute kidney injury, grade 3 ( severe) Anuric  2. COVID-19 pneumonia  3. Mild metabolic acidosis, hyperchloremic plus mild lactic acidosis  4. Hypotension on pressors  5. Encephalopathy      PLAN:  Previously on 2/18/2021 his creatinine was normal at 1.00.   Now creatinine is elevated at 5.29  BUN is elevated 217 from 30 Before   Also anuric  Renal ultrasound to be done  Give IV fluids  Check renal function daily  Check CPK level    Thank you for consult

## 2021-02-22 NOTE — PROGRESS NOTES
Patient was started on norepinephrine and transferred to the ICU. Order was not entered for the norepinephrine by the other floor. It was going at 7.5 when I came on. I turned it off at 0800 due to elevating BP.

## 2021-02-22 NOTE — PROGRESS NOTES
Verbal order received from Dr. Felix Dudley, for bedside swallow evaluation s/t concerns for aspiration risk. Patient is lethargic and appears generally weak. Patient is difficult to keep awake for swallow eval and is orally defensive tightly pursing lips and turning head away from clinician. He does not attempt to verbalize. Patient is not appropriate for PO intake at this time. Rec NPO w/ alternative means of nutrition as medically appropriate.

## 2021-02-22 NOTE — PROGRESS NOTES
Infectious Disease Progress Note           Subjective:   Pt seen and examined at bedside. Transferred to the ICU last night for pressor support due to ongoing hypotension, had been weaned off pressors during my assessment. Objective:   Physical Exam:     Visit Vitals  BP (!) 139/53 (BP 1 Location: Left upper arm, BP Patient Position: At rest)   Pulse 90   Temp 97 °F (36.1 °C)   Resp 24   Ht 6' (1.829 m)   Wt 165 lb 2 oz (74.9 kg)   SpO2 98%   BMI 22.39 kg/m²    O2 Flow Rate (L/min): 4.5 l/min O2 Device: Nasal cannula    Temp (24hrs), Av.2 °F (36.8 °C), Min:96.9 °F (36.1 °C), Max:100 °F (37.8 °C)    No intake/output data recorded.  1901 -  0700  In: -   Out: 125 [Urine:125]    General: NAD, confused, lethargic, not following commands  HEENT: JUAN CARLOS, Moist mucosa   Lungs: Decreased at the bases, no wheeze/rhonchi  Heart: S1S2+, RRR, no murmur  Abdo: Soft, NT, ND, +BS   : + indwelling benjamin cath   Exts: Trace edema, + pulses b/l   Skin: No wounds, No rashes or lesions    Data Review:       Recent Days:  Recent Labs     21  1027   WBC 7.6   HGB 13.8   HCT 41.2        Recent Labs     21  1027   *   CREA 5.29*       No results found for: CRPQN, CRP, CRPM     Microbiology   - Urine Cx : Pending   - Blood Cx : GPC in all 4 bottles     Diagnostics   CXR Results  (Last 48 hours)               21 1026  XR CHEST PORT Final result    Impression:  New left lung opacities, nonspecific, but could represent an infectious process   in the appropriate clinical setting. Asymmetric edema could appear similar,   though is considered less likely. Narrative:  Examination: XR CHEST PORT        History: sob       Comparison: Chest radiograph 2/3/2021       FINDINGS:       Single frontal portable view of the chest. There are new patchy opacities in the   left lung.  No pneumothorax or significant pleural effusion is evident, though   difficult to completely exclude a layering left pleural effusion given the other   opacities. The cardiac silhouette is upper limits of normal for size. Atherosclerosis and tortuosity of the thoracic aorta. Right upper extremity PICC   tip is obscured by overlying cardiac monitoring leads. Osseous structures appear   unchanged. Assessment/Plan     1. Sepsis due to aspiration PNA, UTI, r/o line infection, underlying right arm PICC        Afebrile, WBC WNLs. Currently off pressor support        GPC in clusters isolated from 1/4 BCx ( 02/21), UCx from 02/21 is neg        Continue on Zosyn, will add Vanc for GPC coverage       Midodrine started on for BP support, routine labs in the morning        Suspect chronic aspiration, weak cough, speech therapist to see       2. GPC in clusters bacteremia in the setting of right arm PICC line      Isolated from 1/4 BCx btles collected in the ED      Start on vancomycin pending final culture results, repeat blood Cx in AM     3. UTI, abnormal UA, Cx is pending, + benjamin cath, on Zosyn for GNR coverage      4. Acute renal failure: Cr of 5.29, up from 1.00 on 02/08, seen by nephrology      5. AMS: baseline dementia exacerbated by metabolic encephalopathy from renal failure       Remains confused, not following commands, protective gloves in place      6.  Suspected COVID 19: Low pretest probability, confirmatory test is pending       Continue on Azithromycin and Decadron for now, d/c both if pt tests neg for Damien Tidwell MD    2/22/2021

## 2021-02-22 NOTE — H&P
History and Physical    Patient: Britton Schaumann MRN: [id number]  SSN: [id number]    YOB: 1934  Age: 80 y.o. Sex: male      Subjective:      Britton Schaumann is a 80 y.o. male who has a history of atrial fibrillation, diabetes mellitus type 2, hypertension, hypothyroidism, polycythemia vera, spinal stenosis, TIA was brought to the hospital for altered mental status patient has a history of dementia and recently positive for COVID-19 test patient is unable to give history was hypotensive in the emergency room systolic in the 95Q received IV fluids. Past Medical History:   Diagnosis Date    Atrial fibrillation ([address]     Diabetes mellitus type 2, uncomplicated ([address]     Hyperlipidemia     Hypertension     Hypothyroidism     Polycythemia vera ([facility] [address]     Prostatic hyperplasia     Spinal stenosis     Transient ischemic attack      Past Surgical History:   Procedure Laterality Date    HX OTHER SURGICAL      None      Family History   Problem Relation Age of Onset    Hypertension Mother     Stroke Mother     Lung Cancer Father     Melanoma Father      Social History     Tobacco Use    Smoking status: Unknown If Ever Smoked   Substance Use Topics    Alcohol use: Never     Frequency: Never     Binge frequency: Never      Prior to Admission medications    Medication Sig Start Date End Date Taking? Authorizing Provider   metoprolol succinate (TOPROL-XL) 50 mg XL tablet Take 1 Tab by mouth daily. Provider, Historical   atorvastatin (LIPITOR) 20 mg tablet Take 1 Tab by mouth daily. Provider, Historical   aspirin delayed-release 81 mg tablet Take 1 Tab by mouth daily. Provider, Historical   lisinopriL (PRINIVIL, ZESTRIL) 40 mg tablet Take 1 Tab by mouth daily. Provider, Historical   finasteride (PROSCAR) 5 mg tablet Take 1 Tab by mouth daily. Provider, Historical   alogliptin (Nesina) 25 mg tablet Take 1 Tab by mouth daily as needed.     Provider, Historical   digoxin (LANOXIN) 0.125 mg tablet Take 1 Tab by mouth daily. Provider, Historical   levothyroxine (SYNTHROID) 50 mcg tablet Take 50 mcg by mouth Daily (before breakfast). Provider, Historical   glyBURIDE (DIABETA) 5 mg tablet Take 5 mg by mouth Daily (before breakfast). Provider, Historical   metFORMIN (GLUCOPHAGE) 500 mg tablet Take 500 mg by mouth two (2) times a day. Provider, Historical   SITagliptin (Januvia) 100 mg tablet Take 100 mg by mouth daily. Provider, Historical   dilTIAZem ER (DILACOR XR) 240 mg capsule Take 240 mg by mouth daily. Provider, Historical   dabigatran etexilate (Pradaxa) 75 mg capsule Take 75 mg by mouth every twelve (12) hours. Provider, Historical   tamsulosin (FLOMAX) 0.4 mg capsule Take 0.4 mg by mouth daily. Provider, Historical   tolterodine ER (DETROL LA) 4 mg ER capsule Take 4 mg by mouth daily. Provider, Historical   hydroxyurea (HYDREA) 500 mg capsule Take 500 mg by mouth two (2) times a day. Provider, Historical        No Known Allergies    Review of Systems:  Review of systems not obtained due to patient factors. Objective:     Vitals:    02/21/21 1334 02/21/21 1352 02/21/21 1431 02/21/21 1748   BP:  124/82 131/78 (!) 147/98   Pulse:  90 97 (!) 101   Resp:  (!) 32 24 24   Temp: 100 °F (37.8 °C)  98.1 °F (36.7 °C) 97.9 °F (36.6 °C)   SpO2:  97% 96% 95%   Weight:       Height:            Physical Exam:  General:  lethargic. Eyes:  lethargici   Ears:  Normal TMs and external ear canals both ears. Nose: Nares normal. Septum midline. Mucosa normal. No drainage or sinus tenderness. Mouth/Throat: Lips, mucosa, and tongue normal. Teeth and gums normal.   Neck: Supple, symmetrical, trachea midline, no adenopathy, thyroid: no enlargment/tenderness/nodules, no carotid bruit and no JVD. Back:   Symmetric, no curvature. ROM normal. No CVA tenderness. Lungs:   Diminished air entry to auscultation bilaterally.    Heart:  Regular rate and rhythm, S1, S2 normal, no murmur, click, rub or gallop. Abdomen:   Soft, non-tender. Bowel sounds normal. No masses,  No organomegaly. Extremities: lethargic, atraumatic, no cyanosis or edema. Pulses: 2+ and symmetric all extremities. Skin: Skin color, texture, turgor normal. No rashes or lesions   Lymph nodes: Cervical, supraclavicular, and axillary nodes normal.   Neurologic: lethargic     Recent Results (from the past 24 hour(s))   CBC WITH AUTOMATED DIFF    Collection Time: 02/21/21 10:27 AM   Result Value Ref Range    WBC 7.6 4.1 - 11.1 K/uL    RBC 3.22 (L) 4.10 - 5.70 M/uL    HGB 13.8 12.1 - 17.0 g/dL    HCT 41.2 36.6 - 50.3 %    .0 (H) 80.0 - 99.0 FL    MCH 42.9 (H) 26.0 - 34.0 PG    MCHC 33.5 30.0 - 36.5 g/dL    RDW 14.8 (H) 11.5 - 14.5 %    PLATELET 745 604 - 761 K/uL    MPV 11.9 8.9 - 12.9 FL    NEUTROPHILS 79 (H) 32 - 75 %    LYMPHOCYTES 17 12 - 49 %    MONOCYTES 4 (L) 5 - 13 %    EOSINOPHILS 0 0 - 7 %    BASOPHILS 0 0 - 1 %    IMMATURE GRANULOCYTES 0 0.0 - 0.5 %    ABS. NEUTROPHILS 6.0 1.8 - 8.0 K/UL    ABS. LYMPHOCYTES 1.3 0.8 - 3.5 K/UL    ABS. MONOCYTES 0.3 0.0 - 1.0 K/UL    ABS. EOSINOPHILS 0.0 0.0 - 0.4 K/UL    ABS. BASOPHILS 0.0 0.0 - 0.1 K/UL    ABS. IMM. GRANS. 0.0 0.00 - 0.04 K/UL    DF AUTOMATED     METABOLIC PANEL, COMPREHENSIVE    Collection Time: 02/21/21 10:27 AM   Result Value Ref Range    Sodium 148 (H) 136 - 145 mmol/L    Potassium 4.3 3.5 - 5.1 mmol/L    Chloride 116 (H) 97 - 108 mmol/L    CO2 22 21 - 32 mmol/L    Anion gap 10 5 - 15 mmol/L    Glucose 170 (H) 65 - 100 mg/dL     (H) 6 - 20 mg/dL    Creatinine 5.29 (H) 0.70 - 1.30 mg/dL    BUN/Creatinine ratio 22 (H) 12 - 20      GFR est AA 13 (L) >60 ml/min/1.73m2    GFR est non-AA 10 (L) >60 ml/min/1.73m2    Calcium 8.1 (L) 8.5 - 10.1 mg/dL    Bilirubin, total 0.4 0.2 - 1.0 mg/dL    AST (SGOT) 53 (H) 15 - 37 U/L    ALT (SGPT) 31 12 - 78 U/L    Alk.  phosphatase 69 45 - 117 U/L    Protein, total 6.4 6.4 - 8.2 g/dL    Albumin 2.0 (L) 3.5 - 5.0 g/dL    Globulin 4.4 (H) 2.0 - 4.0 g/dL    A-G Ratio 0.5 (L) 1.1 - 2.2     LACTIC ACID    Collection Time: 02/21/21 10:27 AM   Result Value Ref Range    Lactic acid 2.5 (HH) 0.4 - 2.0 mmol/L   URINALYSIS W/ RFLX MICROSCOPIC    Collection Time: 02/21/21 10:27 AM   Result Value Ref Range    Color [place]      Appearance Turbid (A) Clear      Specific gravity 1.017 1.003 - 1.030      pH (UA) 5.0 5.0 - 8.0      Protein 100 (A) Negative mg/dL    Glucose Negative Negative mg/dL    Ketone Negative Negative mg/dL    Bilirubin Negative Negative      Blood Moderate (A) Negative      Urobilinogen 0.1 0.1 - 1.0 EU/dL    Nitrites Negative Negative      Leukocyte Esterase Large (A) Negative      WBC >100 (H) 0 - 4 /hpf    RBC 10-20 0 - 5 /hpf    Bacteria 4+ (A) Negative /hpf    Mucus 2+ /lpf    Other Urine Micro 1     TROPONIN I    Collection Time: 02/21/21 10:27 AM   Result Value Ref Range    Troponin-I, Qt. 0.21 (H) <0.05 ng/mL   BNP    Collection Time: 02/21/21 10:27 AM   Result Value Ref Range    NT pro-BNP 4,270 (H) <450 pg/mL   PROCALCITONIN    Collection Time: 02/21/21 10:27 AM   Result Value Ref Range    Procalcitonin 8.20 (H) 0 ng/mL   TYPE & SCREEN    Collection Time: 02/21/21 10:27 AM   Result Value Ref Range    Crossmatch Expiration 02/24/2021,2359     ABO/Rh(D) B Positive     Antibody screen Negative     Comment CERNER HX:  B POS    URINE MICROSCOPIC    Collection Time: 02/21/21 10:27 AM   Result Value Ref Range    WBC >100 (H) 0 - 4 /hpf    RBC 10-20 0 - 5 /hpf    Bacteria 4+ (A) Negative /hpf    Mucus 2+ (A) Negative /lpf   LACTIC ACID    Collection Time: 02/21/21 12:50 PM   Result Value Ref Range    Lactic acid 1.6 0.4 - 2.0 mmol/L   TROPONIN I    Collection Time: 02/21/21 12:50 PM   Result Value Ref Range    Troponin-I, Qt. 0.19 (H) <0.05 ng/mL   GLUCOSE, POC    Collection Time: 02/21/21  4:23 PM   Result Value Ref Range    Glucose (POC) 143 (H) 65 - 100 mg/dL    Performed by Huseyin Ortiz Assessment:     Hospital Problems  Never Reviewed          Codes Class Noted POA    Pneumonia ICD-10-CM: J18.9  ICD-9-CM: 967  2/21/2021 Unknown        Renal failure ICD-10-CM: N19  ICD-9-CM: 051  2/21/2021 Unknown        Sepsis ([facility] [address] ICD-10-CM: A41.9  ICD-9-CM: 038.9, 995.91  2/21/2021 Unknown          Severe sepsis  Renal failure consult nephrology   pneumonia possible pneumonitis aspiration versus Covid  Acute hypoxic respiratory failure  Encephalopathy metabolic and septic p  Plan:     Continue on IV antibiotics oxygen supplementation, consult infectious disease.   Appreciate evaluation  Prognosis is guarded  Critical care time of 50 minutes    Signed By: Jourdan Aquino MD     February 21, 2021

## 2021-02-22 NOTE — PROGRESS NOTES
Patient transferred to ICU. ST will require new orders to follow. Please place new consult as medically indicated.

## 2021-02-22 NOTE — CONSULTS
Full consultation dictated. [id number]. Severe sepsis. He is now off Levophed infusion. Appears to be multifactorial including aspiration pneumonia, possible urosepsis as well as line infection. ID is also on the case. He is on 4 L oxygen. Keep n.p.o. Also developed acute renal failure. Nephrology is consulted. Speech evaluation again in the morning. Discussed with the ICU team.  Thank you.

## 2021-02-22 NOTE — PROGRESS NOTES
PT TRANSFERRED TO CVICU 280 FROM [facility] DUE TO LOW BLOOD PRESSURE. PT IS AWAKE AND ALERT BUT NONVERBAL. B/P 78/49 UPON ARRIVAL. LEVOPHED DRIP STARTED SHORTLY THEREAFTER AT 10 MCG. WILL MONITOR BLOOD PRESSURE.

## 2021-02-22 NOTE — CONSULTS
[address] [place] [address]    Name:  Darrius Blanchard  MR#:  [id number]  :  1934  ACCOUNT #:  [de-identified]  DATE OF SERVICE:  2021    ATTENDING PHYSICIAN:  Edelmira Garrison MD    REASON FOR CONSULTATION:  Intensivist consult, the patient with hypotension. HISTORY OF PRESENTING ILLNESS:  The patient is an 80-year-old  male. Information is obtained from current medical records. He has advanced dementia and is mainly nonverbal.  He is a resident of a nursing home facility. He presented to the hospital with a temperature, tachycardia and chest x-ray showing new left opacity. He was evaluated in the emergency room and admitted. He was transferred to the ICU and was started on Levophed. The nurse tells me that his Levophed was weaned off this morning. However, he remains very confused. He has restraints. Speech evaluated the patient, and he is unsafe for oral intake. He is on 4 liters of oxygen. Making some urine output. On IV fluids. ID has also seen the patient. There is concern about aspiration pneumonia. PAST MEDICAL HISTORY:  Significant for atrial fibrillation, diabetes mellitus, hypertension, hypothyroidism, polycythemia vera, spinal stenosis, history of transient ischemic attack and benign prostatic hypertrophy. There is also history of recent COVID infection. ALLERGIES:  NO KNOWN DRUG ALLERGIES. MEDICATIONS:  Currently, the patient is started on a Levophed infusion which has actually been weaned off this morning, azithromycin 500 mg IV daily, dexamethasone 4 mg IV q.8 h., heparin 5000 units subcutaneously q.12 h., insulin subcutaneously, Zosyn 3.375 g IV q.12 h. SOCIAL HISTORY:  He is apparently a resident of a nursing home facility. Unable to obtain any history of drug, alcohol or tobacco abuse.     FAMILY HISTORY:  As gleaned from current medical records, apparently hypertension and stroke in the mother and a history of lung cancer in the father. REVIEW OF SYSTEMS:  Complete review of systems was undertaken. Pertinent findings are discussed above. All other systems were reviewed and are negative. PHYSICAL EXAMINATION:  GENERAL:  The patient is an elderly white male who is awake and mostly nonverbal.  He is in restraints. Does not follow commands. VITAL SIGNS:  Temperature is 96.9 and T-max was 101.5, pulse is 90 per minute, respiratory rate is 24 per minute, and blood pressure now is 139/53. Lowest blood pressure was 83/69. Saturation is 98% on 4 liters oxygen. HEENT:  Shows pupils are equal and reactive to light. No pallor or icterus. Nasal passages are apparently patent. Oropharynx is difficult to evaluate. He will not cooperate with opening his mouth. NECK:  Supple. Trachea is central.  No JVD or lymphadenopathy. He is not using any accessory muscles of respirations. CHEST:  Symmetrical with equal and fair air entry bilaterally. However, he does not take a good inspiratory effort. Difficult to auscultate properly, a few left basilar crackles are noted. HEART:  Rhythm is regular with monitor showing sinus rhythm. No loud murmur or gallop. ABDOMEN:  Soft and appears to be benign. Bowel sounds are audible. EXTREMITIES:  Do not show any cyanosis or clubbing. No pitting edema. Pulses are palpable. NEUROLOGIC:  Shows that the patient is moving his extremities but appears to be confused. He has mittens in place. SKIN:  Warm and dry. LABORATORY DATA:  Portable chest x-ray done in 02/2021 showed development of new mainly left-sided patchy infiltrates compared to a prior study on 02/03/2021. Electrolytes within normal limits. BUN is 117, and creatinine is 5.29. Blood glucose of 170. WBC count of 7.6 with neutrophils of 79%, hemoglobin is 13.8, platelet count of 277. Lactic acid initially was 2.5 and thereafter 1.6. Procalcitonin 8.2. BNP is 4270.     Echocardiogram done on 02/05/2021 showed an EF of 79%, right ventricular systolic pressure of 41 and mildly dilated right ventricle. Urinalysis is showing wbc's, rbc's and bacteria. ASSESSMENT AND PLAN:  1. Severe sepsis:  He has now been weaned off Levophed. Etiology appears to be multifactorial.  Probably urinary tract infection. However, aspiration pneumonia needs to be considered as well. Additionally, Line infection cannot be ruled out. He has right upper extremity PICC (peripherally inserted central catheter) line in place. Infectious Disease is also on the case. Cultures are pending. He is empirically started on Zosyn and azithromycin which will be continued. Further adjustment can be made after blood cultures have resulted or urine cultures have resulted. 2.  Altered mental status: This appears to be secondary to underlying sepsis. He is at risk for aspiration. Hold off p.o. intake. Re-evaluation by Speech in the morning. Otherwise, he will need an nasogastric tube placement. 3.  Hypoxic respiratory failure:  He has mainly left-sided infiltrate. On 4 liters of oxygen. Wean down as appropriate. He is on droplet precautions because of history of COVID-19 infection. We will avoid nebulizations at this time. 4.  Acute renal failure:  Creatinine is 5.29. Continue with IV fluids. Nephrology has been consulted as well. 5.  History of atrial fibrillation:  Currently appears to be in sinus rhythm. Cardiology is also consulted as well. 6.  Multiple other medical problems including diabetes mellitus, hypertension, hypothyroidism, polycythemia vera, spinal stenosis and a history of transient ischemic attack. 7.  For deep venous thrombosis prophylaxis, he is started on heparin subcutaneously. 8.  For gastrointestinal stress prophylaxis, I will start him on IV Protonix. Thank you for allowing me to participate in the care of this patient. I will follow the patient closely with you.   The patient is critically ill, and he is apparently a full code.  He is also on dexamethasone which can be continued at this time. Prognosis is guarded. Discussed with the nursing team in the ICU.       Sherry Graham MD      [id number]  D:  02/22/2021 12:10  T:  02/22/2021 15:11  JOB #:  [id number]

## 2021-02-22 NOTE — ROUTINE PROCESS
Patient blood pressure 75/50, Dr. Walker Leslie notified and orders given to give a 250mL bolus and transfer to ICU to begin a Levophed drip.

## 2021-02-22 NOTE — PROGRESS NOTES
PT'S WIFE ADRYAN CALLED AND TOLD THAT PT WAS MOVED TO CVICU DURING THE NIGHT BECAUSE OF HIS BLOOD PRESSURE BEING LOW AND THE DOCTOR WANTED TO PUT HIM ON LEVOPHED.

## 2021-02-22 NOTE — PROGRESS NOTES
Vancomycin - 02/22/2021    Pharmacy dept consulted by Dr Hernando Nugent for vancomycin therapy per protocol for sepsis. Patient is currently acute kidney injury with elevated Scr and poor urine output. Will pulse dose vancomycin for now to reduce risk of accumulation and toxicity. 1500mg IV x1 dose for today at 1400. Will check level in about 24hrs and adjust therapy based on clinical status.     Jalen OneilD

## 2021-02-22 NOTE — ROUTINE PROCESS
Patient transferred to ICU. Report called to Cara. Patient cleaned up and BP 80/51 prior to transfer. Patient taken to ICU on cardiac monitor. Heart rate and O2 sats remained stable during transfer. Called ICU at [phone] asking if they would call the wife and daughter and notify them of transfer due to not having phone number for either. Numbers on patients chart in ICU.

## 2021-02-23 ENCOUNTER — APPOINTMENT (OUTPATIENT)
Dept: GENERAL RADIOLOGY | Age: 86
DRG: 871 | End: 2021-02-23
Attending: INTERNAL MEDICINE
Payer: MEDICARE

## 2021-02-23 LAB
ALBUMIN SERPL-MCNC: 2 G/DL (ref 3.5–5)
ANION GAP SERPL CALC-SCNC: 13 MMOL/L (ref 5–15)
BUN SERPL-MCNC: 141 MG/DL (ref 6–20)
BUN/CREAT SERPL: 28 (ref 12–20)
CA-I BLD-MCNC: 8.3 MG/DL (ref 8.5–10.1)
CHLORIDE SERPL-SCNC: 128 MMOL/L (ref 97–108)
CO2 SERPL-SCNC: 16 MMOL/L (ref 21–32)
CREAT SERPL-MCNC: 5.04 MG/DL (ref 0.7–1.3)
DATE LAST DOSE: NORMAL
GLUCOSE BLD STRIP.AUTO-MCNC: 162 MG/DL (ref 65–100)
GLUCOSE BLD STRIP.AUTO-MCNC: 181 MG/DL (ref 65–100)
GLUCOSE BLD STRIP.AUTO-MCNC: 197 MG/DL (ref 65–100)
GLUCOSE BLD STRIP.AUTO-MCNC: 201 MG/DL (ref 65–100)
GLUCOSE BLD STRIP.AUTO-MCNC: 213 MG/DL (ref 65–100)
GLUCOSE BLD STRIP.AUTO-MCNC: 230 MG/DL (ref 65–100)
GLUCOSE BLD STRIP.AUTO-MCNC: 254 MG/DL (ref 65–100)
GLUCOSE BLD STRIP.AUTO-MCNC: 256 MG/DL (ref 65–100)
GLUCOSE SERPL-MCNC: 230 MG/DL (ref 65–100)
PERFORMED BY, TECHID: ABNORMAL
PHOSPHATE SERPL-MCNC: 6.1 MG/DL (ref 2.6–4.7)
POTASSIUM SERPL-SCNC: 4.2 MMOL/L (ref 3.5–5.1)
REPORTED DOSE,DOSE: NORMAL UNITS
SODIUM SERPL-SCNC: 157 MMOL/L (ref 136–145)
TROPONIN I SERPL-MCNC: 0.12 NG/ML
VANCOMYCIN SERPL-MCNC: 15.5 UG/ML

## 2021-02-23 PROCEDURE — 36415 COLL VENOUS BLD VENIPUNCTURE: CPT

## 2021-02-23 PROCEDURE — 65610000006 HC RM INTENSIVE CARE

## 2021-02-23 PROCEDURE — 74011000258 HC RX REV CODE- 258: Performed by: INTERNAL MEDICINE

## 2021-02-23 PROCEDURE — 74011636637 HC RX REV CODE- 636/637: Performed by: INTERNAL MEDICINE

## 2021-02-23 PROCEDURE — 74011250636 HC RX REV CODE- 250/636: Performed by: INTERNAL MEDICINE

## 2021-02-23 PROCEDURE — 87040 BLOOD CULTURE FOR BACTERIA: CPT

## 2021-02-23 PROCEDURE — 82962 GLUCOSE BLOOD TEST: CPT

## 2021-02-23 PROCEDURE — 71045 X-RAY EXAM CHEST 1 VIEW: CPT

## 2021-02-23 PROCEDURE — 84484 ASSAY OF TROPONIN QUANT: CPT

## 2021-02-23 PROCEDURE — 92610 EVALUATE SWALLOWING FUNCTION: CPT

## 2021-02-23 PROCEDURE — 80069 RENAL FUNCTION PANEL: CPT

## 2021-02-23 PROCEDURE — 80202 ASSAY OF VANCOMYCIN: CPT

## 2021-02-23 PROCEDURE — 99232 SBSQ HOSP IP/OBS MODERATE 35: CPT | Performed by: INTERNAL MEDICINE

## 2021-02-23 RX ORDER — DEXTROSE 50 % IN WATER (D50W) INTRAVENOUS SYRINGE
25 AS NEEDED
Status: DISCONTINUED | OUTPATIENT
Start: 2021-02-23 | End: 2021-03-03 | Stop reason: HOSPADM

## 2021-02-23 RX ORDER — DEXTROSE MONOHYDRATE 50 MG/ML
150 INJECTION, SOLUTION INTRAVENOUS CONTINUOUS
Status: DISCONTINUED | OUTPATIENT
Start: 2021-02-23 | End: 2021-02-27

## 2021-02-23 RX ADMIN — INSULIN LISPRO 3 UNITS: 100 INJECTION, SOLUTION INTRAVENOUS; SUBCUTANEOUS at 12:18

## 2021-02-23 RX ADMIN — PIPERACILLIN AND TAZOBACTAM 3.38 G: 3; .375 INJECTION, POWDER, LYOPHILIZED, FOR SOLUTION INTRAVENOUS at 09:16

## 2021-02-23 RX ADMIN — INSULIN LISPRO 2 UNITS: 100 INJECTION, SOLUTION INTRAVENOUS; SUBCUTANEOUS at 17:09

## 2021-02-23 RX ADMIN — HEPARIN SODIUM 5000 UNITS: 5000 INJECTION INTRAVENOUS; SUBCUTANEOUS at 02:48

## 2021-02-23 RX ADMIN — DEXTROSE MONOHYDRATE 125 ML/HR: 50 INJECTION, SOLUTION INTRAVENOUS at 15:45

## 2021-02-23 RX ADMIN — FAMOTIDINE 20 MG: 10 INJECTION INTRAVENOUS at 09:17

## 2021-02-23 RX ADMIN — DEXAMETHASONE SODIUM PHOSPHATE 4 MG: 4 INJECTION, SOLUTION INTRA-ARTICULAR; INTRALESIONAL; INTRAMUSCULAR; INTRAVENOUS; SOFT TISSUE at 15:45

## 2021-02-23 RX ADMIN — Medication 10 ML: at 17:09

## 2021-02-23 RX ADMIN — AZITHROMYCIN DIHYDRATE 500 MG: 500 INJECTION, POWDER, LYOPHILIZED, FOR SOLUTION INTRAVENOUS at 10:16

## 2021-02-23 RX ADMIN — INSULIN LISPRO 3 UNITS: 100 INJECTION, SOLUTION INTRAVENOUS; SUBCUTANEOUS at 09:17

## 2021-02-23 RX ADMIN — SODIUM CHLORIDE 1 UNITS/HR: 0.9 INJECTION INTRAVENOUS at 19:03

## 2021-02-23 RX ADMIN — Medication 5 ML: at 06:00

## 2021-02-23 RX ADMIN — INSULIN GLARGINE 15 UNITS: 100 INJECTION, SOLUTION SUBCUTANEOUS at 09:18

## 2021-02-23 RX ADMIN — DEXAMETHASONE SODIUM PHOSPHATE 4 MG: 4 INJECTION, SOLUTION INTRA-ARTICULAR; INTRALESIONAL; INTRAMUSCULAR; INTRAVENOUS; SOFT TISSUE at 06:00

## 2021-02-23 RX ADMIN — HEPARIN SODIUM 5000 UNITS: 5000 INJECTION INTRAVENOUS; SUBCUTANEOUS at 15:45

## 2021-02-23 NOTE — PROGRESS NOTES
Progress Note      2/23/2021 8:13 AM  NAME: Alejandro Barber   MRN:  [id number]   Admit Diagnosis: Sepsis ([facility] [address] [A41.9]  Pneumonia [J18.9]  Renal failure [N19]      Problem List:   -Atrial fibrillation  -Hypertension  -Dyslipidemia  -Hypothyroidism  -Sepsis  -History of TIA  -Spinal stenosis       Assessment/Plan:   -Follow-up visit from cardiology secondary to borderline increased troponin  -Patient is lying comfortably in the bed but did not respond or engage with verbal communication.  -Monitor shows atrial fibrillation with controlled heart rate  -Normal ejection fraction of 79%.  -No acute cardiac issue based on my overall cardiac assessment.  -This was a limited examination this morning as patient has tenderness which could be consistent with COVID-19 infection.  -No further cardiovascular testing warranted at this time based on my overall cardiac assessment and we will continue to watch him while he is in the hospital along with the team with conservative approach         []       High complexity decision making was performed in this patient at high risk for decompensation with multiple organ involvement. Subjective:     Alejandro Barber denies chest pain, dyspnea. Discussed with RN events overnight. Review of Systems: Not performed    Symptom Y/N Comments  Symptom Y/N Comments   Fever/Chills N   Chest Pain N    Poor Appetite N   Edema N    Cough N   Abdominal Pain N    Sputum N   Joint Pain N    SOB/DOS SANTOS N   Pruritis/Rash N    Nausea/vomit N   Tolerating PT/OT Y    Diarrhea N   Tolerating Diet Y    Constipation N   Other       Could NOT obtain due to:      Objective:      Physical Exam:    Last 24hrs VS reviewed since prior progress note.  Most recent are:    Visit Vitals  BP (!) 140/81 (BP 1 Location: Left upper arm, BP Patient Position: At rest)   Pulse 95   Temp 97.8 °F (36.6 °C)   Resp 26   Ht 6' (1.829 m)   Wt 74.9 kg (165 lb 2 oz)   SpO2 100%   BMI 22.39 kg/m²       Intake/Output Summary (Last 24 hours) at 2/23/2021 0813  Last data filed at 2/22/2021 1800  Gross per 24 hour   Intake    Output 450 ml   Net -450 ml        General Appearance: Well developed, well nourished, alert & oriented x 3,    no acute distress. []         Post-cath site without hematoma, bruit, tenderness, or thrill. Distal pulses intact. PMH/ reviewed - no change compared to H&P    Data Review    Telemetry: normal sinus rhythm     EKG:   []  No new EKG for review  US RETROPERITONEUM COMP   Final Result   Small right renal cyst. Mild left hydronephrosis of unclear   etiology. Gonzales catheter in the bladder. Please see full report. XR CHEST PORT   Final Result   New left lung opacities, nonspecific, but could represent an infectious process   in the appropriate clinical setting. Asymmetric edema could appear similar,   though is considered less likely. XR CHEST PORT    (Results Pending)        Lab Data Personally Reviewed:    Recent Labs     02/21/21  1027   WBC 7.6   HGB 13.8   HCT 41.2        No results for input(s): INR, PTP, APTT, INREXT in the last 72 hours. Recent Labs     02/21/21  1027   *   K 4.3   *   CO2 22   *   CREA 5.29*   *   CA 8.1*     Recent Labs     02/21/21  1250 02/21/21  1027   TROIQ 0.19* 0.21*     Lab Results   Component Value Date/Time    Cholesterol, total 185 02/05/2021 01:37 PM    HDL Cholesterol 40 02/05/2021 01:37 PM    LDL, calculated 126.2 (H) 02/05/2021 01:37 PM    Triglyceride 94 02/05/2021 01:37 PM    CHOL/HDL Ratio 4.6 02/05/2021 01:37 PM       Recent Labs     02/21/21  1027   AP 69   TP 6.4   ALB 2.0*   GLOB 4.4*     No results for input(s): PH, PCO2, PO2 in the last 72 hours.     Medications Personally Reviewed:    Current Facility-Administered Medications   Medication Dose Route Frequency    NOREPINephrine (LEVOPHED) 8 mg in 5% dextrose 250mL (32 mcg/mL) infusion  0.5-16 mcg/min IntraVENous TITRATE    famotidine (PF) (PEPCID) 20 mg in 0.9% sodium chloride 10 mL injection  20 mg IntraVENous DAILY    Vancomycin dose per pharmacy protocol   Other Rx Dosing/Monitoring    0.45% sodium chloride infusion  50 mL/hr IntraVENous CONTINUOUS    midodrine (PROAMATINE) tablet 5 mg  5 mg Oral TID PRN    sodium chloride (NS) flush 5-40 mL  5-40 mL IntraVENous Q8H    sodium chloride (NS) flush 5-40 mL  5-40 mL IntraVENous PRN    azithromycin (ZITHROMAX) 500 mg in 0.9% sodium chloride 250 mL (VIAL-MATE)  500 mg IntraVENous DAILY    heparin (porcine) injection 5,000 Units  5,000 Units SubCUTAneous Q12H    dexamethasone (DECADRON) 4 mg/mL injection 4 mg  4 mg IntraVENous Q8H    glucose chewable tablet 16 g  4 Tab Oral PRN    dextrose (D50W) injection syrg 12.5-25 g  25-50 mL IntraVENous PRN    glucagon (GLUCAGEN) injection 1 mg  1 mg IntraMUSCular PRN    insulin glargine (LANTUS) injection 15 Units  15 Units SubCUTAneous DAILY    insulin lispro (HUMALOG) injection   SubCUTAneous TIDAC    piperacillin-tazobactam (ZOSYN) 3.375 g in 0.9% sodium chloride (MBP/ADV) 100 mL MBP  3.375 g IntraVENous Q12H         Arnold Barton MD

## 2021-02-23 NOTE — PROGRESS NOTES
Patient is seen and examined.   Lethargic and not a good historian  Discussed with ICU RN    Past Medical History:   Diagnosis Date    Atrial fibrillation ([facility] [address]     Diabetes mellitus type 2, uncomplicated ([facility] [address]     Hyperlipidemia     Hypertension     Hypothyroidism     Polycythemia vera ([facility] [address]     Prostatic hyperplasia     Spinal stenosis     Transient ischemic attack       Past Surgical History:   Procedure Laterality Date    HX OTHER SURGICAL      None     Family History   Problem Relation Age of Onset    Hypertension Mother     Stroke Mother     Lung Cancer Father     Melanoma Father       Social History     Tobacco Use    Smoking status: Unknown If Ever Smoked   Substance Use Topics    Alcohol use: Never     Frequency: Never     Binge frequency: Never       Current Facility-Administered Medications   Medication Dose Route Frequency Provider Last Rate Last Admin    NOREPINephrine (LEVOPHED) 8 mg in 5% dextrose 250mL (32 mcg/mL) infusion  0.5-16 mcg/min IntraVENous TITRATE Kristine LENZ MD   Stopped at 02/22/21 0800    famotidine (PF) (PEPCID) 20 mg in 0.9% sodium chloride 10 mL injection  20 mg IntraVENous DAILY Joselito Haile MD   20 mg at 02/23/21 0003    Vancomycin dose per pharmacy protocol   Other Rx Dosing/Monitoring Delaney Flynn MD        0.45% sodium chloride infusion  50 mL/hr IntraVENous CONTINUOUS Joi Jolly MD 50 mL/hr at 02/22/21 1440 50 mL/hr at 02/22/21 1440    midodrine (PROAMATINE) tablet 5 mg  5 mg Oral TID PRN Delaney Flynn MD        sodium chloride (NS) flush 5-40 mL  5-40 mL IntraVENous Q8H Candelario Alvarez MD   5 mL at 02/23/21 0600    sodium chloride (NS) flush 5-40 mL  5-40 mL IntraVENous PRN Kristine LENZ MD        azithromycin (ZITHROMAX) 500 mg in 0.9% sodium chloride 250 mL (VIAL-MATE)  500 mg IntraVENous DAILY Kristine LENZ MD   500 mg at 02/23/21 1016    heparin (porcine) injection 5,000 Units  5,000 Units SubCUTAneous Q12H Lidia Ruiz MD   5,000 Units at 02/23/21 0248    dexamethasone (DECADRON) 4 mg/mL injection 4 mg  4 mg IntraVENous Q8H Fidel LENZ MD   4 mg at 02/23/21 0600    glucose chewable tablet 16 g  4 Tab Oral PRN Lidia Ruiz MD        dextrose (D50W) injection syrg 12.5-25 g  25-50 mL IntraVENous PRN Lidia Ruiz MD        glucagon (GLUCAGEN) injection 1 mg  1 mg IntraMUSCular PRN Lidia Ruiz MD        insulin glargine (LANTUS) injection 15 Units  15 Units SubCUTAneous DAILY Lidia Ruiz MD   15 Units at 02/23/21 0918    insulin lispro (HUMALOG) injection   SubCUTAneous TIDAC Lidia Ruiz MD   3 Units at 02/23/21 1218    piperacillin-tazobactam (ZOSYN) 3.375 g in 0.9% sodium chloride (MBP/ADV) 100 mL MBP  3.375 g IntraVENous Q12H Owen Urias  mL/hr at 02/23/21 0916 3.375 g at 02/23/21 6226        Review of Systems   Unable to perform ROS: Patient nonverbal       Objective     Vital signs for last 24 hours:  Visit Vitals  /79 (BP 1 Location: Left upper arm, BP Patient Position: At rest)   Pulse (!) 101   Temp 97.6 °F (36.4 °C)   Resp 25   Ht 6' (1.829 m)   Wt 74.9 kg (165 lb 2 oz)   SpO2 99%   BMI 22.39 kg/m²       Intake/Output this shift:  Current Shift: No intake/output data recorded. Last 3 Shifts: 02/21 1901 - 02/23 0700  In: -   Out: 1175 [Urine:1175]  Physical Exam   Constitutional: He appears well-developed and well-nourished. HENT:   Head: Normocephalic. Pulmonary/Chest: Effort normal and breath sounds normal.   Abdominal: Soft. Bowel sounds are normal.   Skin: Skin is warm and dry.    Appears to be obtunded  On oxygen by nasal cannula    Data Review:   Recent Results (from the past 24 hour(s))   GLUCOSE, POC    Collection Time: 02/22/21  3:57 PM   Result Value Ref Range    Glucose (POC) 195 (H) 65 - 100 mg/dL    Performed by Ana Cowan    GLUCOSE, POC    Collection Time: 02/22/21  9:15 PM   Result Value Ref Range    Glucose (POC) 171 (H) 65 - 100 mg/dL    Performed by Fer Zayas, POC    Collection Time: 02/23/21  7:27 AM   Result Value Ref Range    Glucose (POC) 213 (H) 65 - 100 mg/dL    Performed by Purnima Subramanian    TROPONIN I    Collection Time: 02/23/21  9:25 AM   Result Value Ref Range    Troponin-I, Qt. 0.12 (H) <0.05 ng/mL   RENAL FUNCTION PANEL    Collection Time: 02/23/21  9:25 AM   Result Value Ref Range    Sodium 157 (H) 136 - 145 mmol/L    Potassium 4.2 3.5 - 5.1 mmol/L    Chloride 128 (H) 97 - 108 mmol/L    CO2 16 (L) 21 - 32 mmol/L    Anion gap 13 5 - 15 mmol/L    Glucose 230 (H) 65 - 100 mg/dL     (H) 6 - 20 mg/dL    Creatinine 5.04 (H) 0.70 - 1.30 mg/dL    BUN/Creatinine ratio 28 (H) 12 - 20      GFR est AA 13 (L) >60 ml/min/1.73m2    GFR est non-AA 11 (L) >60 ml/min/1.73m2    Calcium 8.3 (L) 8.5 - 10.1 mg/dL    Phosphorus 6.1 (H) 2.6 - 4.7 mg/dL    Albumin 2.0 (L) 3.5 - 5.0 g/dL   GLUCOSE, POC    Collection Time: 02/23/21 11:03 AM   Result Value Ref Range    Glucose (POC) 230 (H) 65 - 100 mg/dL    Performed by Purnima Subramanian          XR CHEST PORT   Final Result      US RETROPERITONEUM COMP   Final Result   Small right renal cyst. Mild left hydronephrosis of unclear   etiology. Gonzales catheter in the bladder. Please see full report. XR CHEST PORT   Final Result   New left lung opacities, nonspecific, but could represent an infectious process   in the appropriate clinical setting. Asymmetric edema could appear similar,   though is considered less likely. Patient Active Problem List   Diagnosis Code    Fall W19. XXXA    Weakness R53.1    Pneumonia J18.9    Renal failure N19    Sepsis ([facility] [address] A41.9        DIAGNOSES:  1. Acute kidney injury, grade 3 ( severe) Anuric, continues to go uncertain  2. COVID-19 pneumonia  3. Severe hypernatremia  4. Hyperchloremic metabolic acidosis  5. Hypotension on pressors  6.  Encephalopathy      PLAN:  We will add hypotonic IV fluids  Flush Gonzaels catheter  Renal ultrasound if not done  It appears that we may need to prepare for possible dialysis.       Thank you for consult

## 2021-02-23 NOTE — PROGRESS NOTES
Vancomycin - 02/23/21    Vancomycin level drawn today resulted 15. 5. patient had nephrololgy consult and initiation of dialysis is being discussed. Will continue to follow closely and make necessary adjustments to therapy based on clinical status.     One time dose of IV vancomycin 750mg to be given this evening and will check blood level Thursday 02/25 in [address] [place] [address] Mj

## 2021-02-23 NOTE — PROGRESS NOTES
Pulmonary Progress Note      NAME: Bacilio Dobson   :  1934  MRM:  [id number]    Date/Time: 2021  11:56 AM         Subjective:     Patient seen and examined in the ICU. Discussed with the RN. He is on 3 L oxygen. Somewhat more responsive and answers simple questions. Speech evaluation still pending. No distress. He has a Gonzales catheter and Flexi-Seal.      Past Medical History reviewed and unchanged from Admission History and Physical       Objective:     Physical Exam     Vitals:      Last 24hrs VS reviewed since prior progress note. Most recent are:    Visit Vitals  /70   Pulse 96   Temp 97.6 °F (36.4 °C)   Resp 25   Ht 6' (1.829 m)   Wt 74.9 kg (165 lb 2 oz)   SpO2 98%   BMI 22.39 kg/m²     SpO2 Readings from Last 6 Encounters:   21 98%   21 96%    O2 Flow Rate (L/min): 2 l/min       Intake/Output Summary (Last 24 hours) at 2021 1156  Last data filed at 2021 0600  Gross per 24 hour   Intake    Output 1050 ml   Net -1050 ml      GENERAL:  The patient is an elderly white male who is awake and answers simple questions. No distress. On 3 L oxygen. HEENT:  Shows pupils are equal and reactive to light. No pallor or icterus. Nasal passages are apparently patent. Oropharynx is difficult to evaluate. He will not cooperate with opening his mouth. NECK:  Supple. Trachea is central.  No JVD or lymphadenopathy. He is not using any accessory muscles of respirations. CHEST:  Symmetrical with equal and fair air entry bilaterally. However, he does not take a good inspiratory effort. Difficult to auscultate properly, a few left basilar crackles are noted. HEART:  Rhythm is regular with monitor showing sinus rhythm. No loud murmur or gallop. ABDOMEN:  Soft and appears to be benign. Bowel sounds are audible. He has a Gonzales catheter and a Flexi-Seal in place. EXTREMITIES:  Do not show any cyanosis or clubbing. No pitting edema. Pulses are palpable.   NEUROLOGIC: Shows that the patient is moving his extremities but appears to be confused. He has mittens in place. SKIN:  Warm and dry. XR CHEST PORT   Final Result      US RETROPERITONEUM COMP   Final Result   Small right renal cyst. Mild left hydronephrosis of unclear   etiology. Gonzales catheter in the bladder. Please see full report. XR CHEST PORT   Final Result   New left lung opacities, nonspecific, but could represent an infectious process   in the appropriate clinical setting. Asymmetric edema could appear similar,   though is considered less likely.              Lab Data Reviewed: (see below)      Medications:  Current Facility-Administered Medications   Medication Dose Route Frequency    NOREPINephrine (LEVOPHED) 8 mg in 5% dextrose 250mL (32 mcg/mL) infusion  0.5-16 mcg/min IntraVENous TITRATE    famotidine (PF) (PEPCID) 20 mg in 0.9% sodium chloride 10 mL injection  20 mg IntraVENous DAILY    Vancomycin dose per pharmacy protocol   Other Rx Dosing/Monitoring    0.45% sodium chloride infusion  50 mL/hr IntraVENous CONTINUOUS    midodrine (PROAMATINE) tablet 5 mg  5 mg Oral TID PRN    sodium chloride (NS) flush 5-40 mL  5-40 mL IntraVENous Q8H    sodium chloride (NS) flush 5-40 mL  5-40 mL IntraVENous PRN    azithromycin (ZITHROMAX) 500 mg in 0.9% sodium chloride 250 mL (VIAL-MATE)  500 mg IntraVENous DAILY    heparin (porcine) injection 5,000 Units  5,000 Units SubCUTAneous Q12H    dexamethasone (DECADRON) 4 mg/mL injection 4 mg  4 mg IntraVENous Q8H    glucose chewable tablet 16 g  4 Tab Oral PRN    dextrose (D50W) injection syrg 12.5-25 g  25-50 mL IntraVENous PRN    glucagon (GLUCAGEN) injection 1 mg  1 mg IntraMUSCular PRN    insulin glargine (LANTUS) injection 15 Units  15 Units SubCUTAneous DAILY    insulin lispro (HUMALOG) injection   SubCUTAneous TIDAC    piperacillin-tazobactam (ZOSYN) 3.375 g in 0.9% sodium chloride (MBP/ADV) 100 mL MBP  3.375 g IntraVENous Q12H ______________________________________________________________________      Lab Review:     Recent Labs     02/21/21  1027   WBC 7.6   HGB 13.8   HCT 41.2        Recent Labs     02/23/21  0925 02/21/21  1027   * 148*   K 4.2 4.3   * 116*   CO2 16* 22   * 170*   * 117*   CREA 5.04* 5.29*   CA 8.3* 8.1*   PHOS 6.1*  --    ALB 2.0* 2.0*   ALT  --  31     No components found for: GLPOC  No results for input(s): PH, PCO2, PO2, HCO3, FIO2 in the last 72 hours. No results for input(s): INR, INREXT, INREXT in the last 72 hours. Other pertinent lab:          Assessment & Plan:        1. Severe sepsis:  He was weaned off Levophed 2/22. Etiology appears to be multifactorial.  Probably urinary tract infection. However, aspiration pneumonia needs to be considered as well. Additionally, Line infection cannot be ruled out. He has right upper extremity PICC (peripherally inserted central catheter) line in place. Infectious Disease is also on the case. Cultures are pending. He is empirically started on Zosyn and azithromycin which will be continued. Further adjustment can be made after blood cultures have resulted or urine cultures have resulted. 2.  Altered mental status: This appears to be secondary to underlying sepsis. He is at risk for aspiration. Hold off p.o. intake. Re-evaluation by Speech is pending. He is somewhat more awake and alert and following simple commands. Otherwise, he will need an nasogastric tube placement. 3.  Hypoxic respiratory failure:  He has mainly left-sided infiltrate. On 3 liters of oxygen. Wean down as appropriate. He is on droplet precautions because of history of COVID-19 infection. We will avoid nebulizations at this time. Chest x-ray done today shows no significant change. 4.  Acute renal failure:  Creatinine is 5.04. Continue with IV fluids. Nephrology has been consulted as well.   5.  History of atrial fibrillation: Cardiology is also consulted as well. 6.  Multiple other medical problems including diabetes mellitus, hypertension, hypothyroidism, polycythemia vera, spinal stenosis and a history of transient ischemic attack. 7.  For deep venous thrombosis prophylaxis, he is started on heparin subcutaneously. 8.  For gastrointestinal stress prophylaxis, on IV Pepcid.     Thank you for allowing me to participate in the care of this patient. I will follow the patient closely with you. The patient is apparently a full code. He is also on dexamethasone which can be continued at this time. Prognosis is guarded. Discussed with the nursing team in the ICU.   More than 30 minutes spent with patient care.  Desiree Millard MD

## 2021-02-23 NOTE — PROGRESS NOTES
Infectious Disease Progress Note           Subjective:   Stable, remains in the ICU for close monitoring, less agitated, remains confused. No acute events since last seen   Objective:   Physical Exam:     Visit Vitals  BP (!) 98/57 (BP 1 Location: Left upper arm, BP Patient Position: At rest)   Pulse 98   Temp (!) 100.5 °F (38.1 °C)   Resp 27   Ht 6' (1.829 m)   Wt 165 lb 2 oz (74.9 kg)   SpO2 99%   BMI 22.39 kg/m²    O2 Flow Rate (L/min): 2 l/min O2 Device: Nasal cannula    Temp (24hrs), Av.1 °F (36.7 °C), Min:97.4 °F (36.3 °C), Max:100.5 °F (38.1 °C)    701 - 1900  In: -   Out: 400 [Urine:400]   1901 -  0700  In: -   Out: 1175 [YYXOO:5215]    General: NAD, confused, lethargic, not following commands  HEENT: JUAN CARLOS, Moist mucosa   Lungs: Decreased at the bases, no wheeze/rhonchi  Heart: S1S2+, RRR, no murmur  Abdo: Soft, NT, ND, +BS   : + indwelling benjamin cath   Exts: Trace edema, + pulses b/l   Skin: No wounds, No rashes or lesions    Data Review:       Recent Days:  Recent Labs     21  1027   WBC 7.6   HGB 13.8   HCT 41.2        Recent Labs     21  0925 21  1027   * 117*   CREA 5.04* 5.29*       No results found for: CRPQN, CRP, CRPM     Microbiology   - Urine Cx : Pending   - Blood Cx : GPC in all 4 bottles     Diagnostics   CXR Results  (Last 48 hours)               21 0435  XR CHEST PORT Final result    Narrative:  Chest single view. Comparison single view chest 2021       Stable right PICC   Patchy lower lung reticular markings predominant left lower lobe lung, perhaps   minor improvement in aeration compared to prior imaging. Cardiac and mediastinal   structures unchanged. Thoracic aorta atherosclerosis. No pneumothorax or sizable   pleural effusion. Assessment/Plan     1.   Sepsis due  UTI, suspected aspiration PNA/line infection        Tmax of 100.5F today, routine labs not done       Coag neg staph isolated from Bcx and GNR from Urine        Continue on Vanc and Zosyn pending final Cx results        Routine labs in the morning       2. Coag neg staph bacteremia in the setting of right arm PICC line      Isolated from 1/4 BCx btles collected in the ED      Repeat Bcx from 02/23 is pending. Likely continue Vanc for 7-10 days if repeat Bcx remains neg     3. UTI, abnormal UA, GNR isolated from Cx, + benjamin cath. On day # 3 of Zosyn     4. Acute renal failure: Cr trending down, being followed by nephrology     5. AMS: baseline dementia exacerbated by metabolic encephalopathy       Pt remains confused, not following commands     6. Suspected aspiration PNA: W new LLL infiltrate on admission CXR       Improved infiltrates on todays CXR.  No increased O2 requirements       Test for COVID-19 is pending     Zuhair Rider MD    2/23/2021

## 2021-02-23 NOTE — PROGRESS NOTES
Reason for Readmission:     Shortness of breath         RUR Score/Risk Level:     22%    PCP: First and Last name:  Terry Valdez -[phone]   Name of Practice:    Are you a current patient: Yes/No:    Approximate date of last visit:    Can you participate in a virtual visit with your PCP:     Is a Care Conference indicated:   Pending clinical course      Did you attend your follow up appointment (s): If not, why not:  Patient was from [address] [place] [address]. Resources/supports as identified by patient/family:     Patient lives at home with wife. Top Challenges facing patient (as identified by patient/family and CM): Finances/Medication cost?     None  Transportation      None  Support system or lack thereof? None  Living arrangements? None  Self-care/ADLs/Cognition? Patient needs to work with therapy once appropriate to determine if it is safe to discharge home vs. SNF. Current Advanced Directive/Advance Care Plan:    Patient does have a AMD. POA is wife Jennifer Mejia. Advance Care Planning   Healthcare Decision Maker:       Primary Decision Maker: Faisal Beltran  [phone]      Plan for utilizing home health:     Pending clinical course. Transition of Care Plan:    Based on readmission, the patient's previous Plan of Care has been evaluated and/or modified. The current Transition of Care Plan is:         Patient was recently discharged from [facility] on 2/8 to [facility] for short term rehab. Due to documentation that patient is not very responsive, writer spoke with patient's wife Ruthann Ramos for DCP assessment. Patient lives at home with his wife. Patient has multiple DME: rollator, cane, shower chair, no home oxygen. Patient's home address:  [address] [place] [address] Boby Tirado    Discharge plan is pending clinical course.  Ruthann Ramos would like the patient to return home at discharge is possible, however patient should work with PT/OT once appropriate to evaluate that patient is safe to return home. CM will continue to follow.

## 2021-02-23 NOTE — PROGRESS NOTES
Comprehensive Nutrition Assessment    Type and Reason for Visit: Initial(NST)    Nutrition Recommendations/Plan:   As NG available for use, initaite TF Glucerna 1.5 at 30mL/hr  Increase by 20mL q4hr to goal rate 60mL/hr, continuous  Flush with 150mL free water q4hr  Providing 2160kcal, 119g protein, 1993mL fluid (~100% est needs)    Document TF rate, water flushes, and GRVs in EMR    Nutrition Assessment:  Unresponsive pta. COVID+. Dx sepsis. Admitted to floor, transferred to ICU same night d/t concern for hypotension. Pt on pressor x1 day, d/c yesterday. Remains altered. SLP attempted eval x2, rec'd NPO with alternate means of nutrition. Per RN, plans to place NG today. RD to provide TF recs. D5 initiated today, providing protein-sparing kcal. Labs: Na 158, , Cr 4.38, BG 82, BG . Meds: azithromycin, D5 125mL/hr, heparin, zosyn. Malnutrition Assessment:  Malnutrition Status:  Mild malnutrition    Context:  Acute illness     Findings of the 6 clinical characteristics of malnutrition:   Energy Intake:  7 - 50% or less of est energy requirements for 5 or more days  Weight Loss:  Unable to assess     Body Fat Loss:  Unable to assess,     Muscle Mass Loss:  Unable to assess,    Fluid Accumulation:  No significant fluid accumulation,      Estimated Daily Nutrient Needs:  Energy (kcal): 2125kcal (28kcal/kg); Weight Used for Energy Requirements: Current  Protein (g): 99g (1.3g/kg); Weight Used for Protein Requirements: Current  Fluid (ml/day): 2125mL; Method Used for Fluid Requirements: 1 ml/kcal      Nutrition Related Findings:  Unable to perform NFPE 2/2 COVID precautions. Pt appears nourished on visual assessment. No documented hx dysphagia, n/v, or c/d.  SLP following for dysphagia- appears to be 2/2 mentation vs physical.      Wounds:    None       Current Nutrition Therapies:  DIET NPO  DIET TUBE FEEDING Glucerna 1.5    Anthropometric Measures:  · Height:  6' (182.9 cm)  · Current Body Wt:  76 kg (167 lb 8.8 oz)(2/23)   · Ideal Body Wt:  178 lbs:  94.1 %   · BMI Category:  Normal weight (BMI 22.0-24.9) age over 72     Not wt hx available to assess. Wt Readings from Last 3 Encounters:   02/23/21 76 kg (167 lb 8.8 oz)   02/03/21 95.3 kg (210 lb) - Stated vs est       Nutrition Diagnosis:   · Inadequate oral intake related to cognitive or neurological impairment as evidenced by NPO or clear liquid status due to medical condition      Nutrition Interventions:   Food and/or Nutrient Delivery: Continue NPO, Start tube feeding  Nutrition Education and Counseling: No recommendations at this time  Coordination of Nutrition Care: Continue to monitor while inpatient    Goals:  Meet >75% EENs via PO, Wt maintenance +/- 0.5kg per week, Lytes wnl       Nutrition Monitoring and Evaluation:   Behavioral-Environmental Outcomes: None identified  Food/Nutrient Intake Outcomes: Enteral nutrition intake/tolerance  Physical Signs/Symptoms Outcomes: Weight, Biochemical data    Discharge Planning:     Too soon to determine     Electronically signed by Man Pierre on 2/24/2021 at 3:41 PM    Contact:

## 2021-02-23 NOTE — WOUND CARE
Wound Care Note: Wound Care into see patient because of sacrum / boggy heels Skin Care & Pressure Relief Recommendations: 
Minimize layers of linen Pads under patient to optimize support surface and microclimate Turn/reposition approximately every 2 hours. Pillow Wedges Manage incontinence Promote continence; Skin Protective lotion to buttocks and sacrum daily and as needed with incontinence care Offload heels with pillows or offloading boots. Discussed above plan with patient & Rn 
Total Care Sport with air while in ICU. Patient will need a low air loss bed when transferred to med-surg unit. Heel boots while in bed. Use foam wedge for repositioning. Blanchable erythema to sacrum. Will continue to follow.   
Transition of Care: Plan to follow weekly while admitted to hospital.

## 2021-02-23 NOTE — PROGRESS NOTES
SPEECH LANGUAGE PATHOLOGY BEDSIDE SWALLOW EVALUATIONS  Patient: Alejandro Barber  (89 y.o. )  Date: 2/23/2021  Primary Diagnosis: Sepsis, pneumonia, renal failure  Precautions:  Droplet plus, fall and aspiration    ASSESSMENT :  Patient drowsy but more alert, oriented x2, answers personal yes/no appropriately,  and follows basic commands. He appears weak. Oral care performed. Patient w/ significant dry oral mucosa. Patient presents w/ severe oral dysphagia. Oral phase c/b poor labial closure w/ anterior spillage and absent oral initiation. Pharyngeal phase not initiated. Patient at significant risk for aspiration. Patient will benefit from skilled intervention to address the above impairments. Patients rehabilitation potential is considered to be Guarded     PLAN :  Recommendations and Planned Interventions:  Rec NPO w/ alternative means of nutrition as medically indicated. MBS once medically appropriate. Frequency/Duration: Patient will be followed by speech-language pathology 5 times a week to address goals. Discharge Recommendations: Skilled Nursing Facility     SUBJECTIVE:   Patient non verbal during assessment. OBJECTIVE:     Past Medical History:   Diagnosis Date    Atrial fibrillation ([address]     Diabetes mellitus type 2, uncomplicated ([address]     Hyperlipidemia     Hypertension     Hypothyroidism     Polycythemia vera ([address]     Prostatic hyperplasia     Spinal stenosis     Transient ischemic attack        CXR Results  (Last 48 hours)                 02/23/21 0435  XR CHEST PORT Final result    Narrative:  Chest single view. Comparison single view chest February 21, 2021       Stable right PICC   Patchy lower lung reticular markings predominant left lower lobe lung, perhaps   minor improvement in aeration compared to prior imaging. Cardiac and mediastinal   structures unchanged. Thoracic aorta atherosclerosis. No pneumothorax or sizable   pleural effusion.                 Diet prior to admission: unknown  Current Diet:  DIET NPO     Cognitive and Communication Status:  Neurologic State: Confused, Drowsy  Orientation Level: Oriented to person, Oriented to place(obtained through yes/no)  Cognition: Decreased attention/concentration, Follows commands  Perception: Appears intact  Perseveration: No perseveration noted     Swallowing Evaluation:   Oral Assessment:  Oral Assessment  Labial: Decreased rate;Decreased seal  Dentition: Limited  Oral Hygiene: poor oral hygiene dry bloody mucosa  Lingual: Decreased rate;Decreased strength  Velum: Unable to visualize  Mandible: No impairment  P.O. Trials:  Patient Position: upright in bed  Vocal quality prior to P.O.: Aphonic  Consistency Presented: Honey thick liquid; Ice chips; Nectar thick liquid; Thin liquid  How Presented: SLP-fed/presented;Cup/sip;Spoon     Bolus Acceptance: No impairment  Bolus Formation/Control: Impaired  Type of Impairment: Poor;Posterior  Propulsion: Absent  Oral Residue: Greater than 50% of bolus  Initiation of Swallow: Absent                      Oral Phase Severity: Severe  Pharyngeal Phase Severity : (unable to assess)  Voice:     Vocal Quality: Aphonic         Pain:   0      After treatment:   Patient left in no apparent distress in bed, Call bell within reach, and Nursing notified    COMMUNICATION/EDUCATION:   Patient educated on ST POC, s/s aspiration and aspiration precautions. Cognitive status negatively impacts comprehension and carryover of education. The patient's plan of care including recommendations, planned interventions, and recommended diet changes were discussed with: Registered nurse. Patient is unable to participate in goal setting and plan of care.     Thank you for this referral.  Eryn Chaney M.S., M.Ed., CCC-SLP  Time Calculation: 12 mins    Problem: Dysphagia (Adult)  Goal: *Acute Goals and Plan of Care (Insert Text)  Description: Speech Therapy Swallow Goals  Initiated 2/23/2021  -Patient will tolerate PO trials without clinical indicators of aspiration given moderate cues within 7 day(s). [ ] Not met  [ ]  MET   [ ] Progressing  [ ] Daylin Turner  -Patient will participate in modified barium swallow study within 7 day(s). [ ] Not met  [ ]  MET   [ ] Progressing  [ ] Daylin Turner  -Patient will demonstrate understanding of swallow safety precautions and aspiration precautions, diet recs with moderate cues within 7 day(s).         [ ] Not met  [ ]  MET   [ ] Progressing  [ ] Discontinue    Outcome: Initial

## 2021-02-24 ENCOUNTER — APPOINTMENT (OUTPATIENT)
Dept: GENERAL RADIOLOGY | Age: 86
DRG: 871 | End: 2021-02-24
Attending: INTERNAL MEDICINE
Payer: MEDICARE

## 2021-02-24 LAB
ALBUMIN SERPL-MCNC: 1.9 G/DL (ref 3.5–5)
ANION GAP SERPL CALC-SCNC: 7 MMOL/L (ref 5–15)
BACTERIA SPEC CULT: ABNORMAL
BASOPHILS # BLD: 0 K/UL (ref 0–0.1)
BASOPHILS NFR BLD: 0 % (ref 0–1)
BUN SERPL-MCNC: 136 MG/DL (ref 6–20)
BUN/CREAT SERPL: 31 (ref 12–20)
CA-I BLD-MCNC: 8.2 MG/DL (ref 8.5–10.1)
CHLORIDE SERPL-SCNC: 132 MMOL/L (ref 97–108)
CO2 SERPL-SCNC: 19 MMOL/L (ref 21–32)
COLONY COUNT,CNT: ABNORMAL
CREAT SERPL-MCNC: 4.38 MG/DL (ref 0.7–1.3)
CRP SERPL-MCNC: 4.33 MG/DL (ref 0–0.6)
DIFFERENTIAL METHOD BLD: ABNORMAL
EOSINOPHIL # BLD: 0 K/UL (ref 0–0.4)
EOSINOPHIL NFR BLD: 0 % (ref 0–7)
ERYTHROCYTE [DISTWIDTH] IN BLOOD BY AUTOMATED COUNT: 15.3 % (ref 11.5–14.5)
GLUCOSE BLD STRIP.AUTO-MCNC: 110 MG/DL (ref 65–100)
GLUCOSE BLD STRIP.AUTO-MCNC: 63 MG/DL (ref 65–100)
GLUCOSE BLD STRIP.AUTO-MCNC: 79 MG/DL (ref 65–100)
GLUCOSE BLD STRIP.AUTO-MCNC: 86 MG/DL (ref 65–100)
GLUCOSE SERPL-MCNC: 82 MG/DL (ref 65–100)
HCT VFR BLD AUTO: 34.6 % (ref 36.6–50.3)
HGB BLD-MCNC: 11.3 G/DL (ref 12.1–17)
IMM GRANULOCYTES # BLD AUTO: 0 K/UL (ref 0–0.04)
IMM GRANULOCYTES NFR BLD AUTO: 0 % (ref 0–0.5)
LYMPHOCYTES # BLD: 0.6 K/UL (ref 0.8–3.5)
LYMPHOCYTES NFR BLD: 5 % (ref 12–49)
MCH RBC QN AUTO: 41.4 PG (ref 26–34)
MCHC RBC AUTO-ENTMCNC: 32.7 G/DL (ref 30–36.5)
MCV RBC AUTO: 126.7 FL (ref 80–99)
MONOCYTES # BLD: 0.2 K/UL (ref 0–1)
MONOCYTES NFR BLD: 1 % (ref 5–13)
NEUTS SEG # BLD: 12 K/UL (ref 1.8–8)
NEUTS SEG NFR BLD: 94 % (ref 32–75)
NRBC # BLD: 0 K/UL (ref 0–0.01)
NRBC BLD-RTO: 0 PER 100 WBC
PERFORMED BY, TECHID: ABNORMAL
PERFORMED BY, TECHID: ABNORMAL
PERFORMED BY, TECHID: NORMAL
PERFORMED BY, TECHID: NORMAL
PHOSPHATE SERPL-MCNC: 3.3 MG/DL (ref 2.6–4.7)
PLATELET # BLD AUTO: 151 K/UL (ref 150–400)
PMV BLD AUTO: 12.2 FL (ref 8.9–12.9)
POTASSIUM SERPL-SCNC: 3.5 MMOL/L (ref 3.5–5.1)
PROCALCITONIN SERPL-MCNC: 2.38 NG/ML
RBC # BLD AUTO: 2.73 M/UL (ref 4.1–5.7)
SODIUM SERPL-SCNC: 158 MMOL/L (ref 136–145)
SPECIAL REQUESTS,SREQ: ABNORMAL
SPECIAL REQUESTS,SREQ: ABNORMAL
WBC # BLD AUTO: 12.7 K/UL (ref 4.1–11.1)

## 2021-02-24 PROCEDURE — 74011000250 HC RX REV CODE- 250: Performed by: INTERNAL MEDICINE

## 2021-02-24 PROCEDURE — 65610000006 HC RM INTENSIVE CARE

## 2021-02-24 PROCEDURE — 84145 PROCALCITONIN (PCT): CPT

## 2021-02-24 PROCEDURE — 74011000258 HC RX REV CODE- 258: Performed by: INTERNAL MEDICINE

## 2021-02-24 PROCEDURE — 77010033678 HC OXYGEN DAILY

## 2021-02-24 PROCEDURE — 74011250636 HC RX REV CODE- 250/636: Performed by: INTERNAL MEDICINE

## 2021-02-24 PROCEDURE — 71045 X-RAY EXAM CHEST 1 VIEW: CPT

## 2021-02-24 PROCEDURE — 0D9670Z DRAINAGE OF STOMACH WITH DRAINAGE DEVICE, VIA NATURAL OR ARTIFICIAL OPENING: ICD-10-PCS | Performed by: INTERNAL MEDICINE

## 2021-02-24 PROCEDURE — U0003 INFECTIOUS AGENT DETECTION BY NUCLEIC ACID (DNA OR RNA); SEVERE ACUTE RESPIRATORY SYNDROME CORONAVIRUS 2 (SARS-COV-2) (CORONAVIRUS DISEASE [COVID-19]), AMPLIFIED PROBE TECHNIQUE, MAKING USE OF HIGH THROUGHPUT TECHNOLOGIES AS DESCRIBED BY CMS-2020-01-R: HCPCS

## 2021-02-24 PROCEDURE — 99232 SBSQ HOSP IP/OBS MODERATE 35: CPT | Performed by: INTERNAL MEDICINE

## 2021-02-24 PROCEDURE — 85025 COMPLETE CBC W/AUTO DIFF WBC: CPT

## 2021-02-24 PROCEDURE — 80069 RENAL FUNCTION PANEL: CPT

## 2021-02-24 PROCEDURE — 74011000250 HC RX REV CODE- 250: Performed by: NURSE PRACTITIONER

## 2021-02-24 PROCEDURE — 36415 COLL VENOUS BLD VENIPUNCTURE: CPT

## 2021-02-24 PROCEDURE — 86140 C-REACTIVE PROTEIN: CPT

## 2021-02-24 PROCEDURE — 82962 GLUCOSE BLOOD TEST: CPT

## 2021-02-24 RX ORDER — LORAZEPAM 2 MG/ML
1 INJECTION INTRAMUSCULAR ONCE
Status: COMPLETED | OUTPATIENT
Start: 2021-02-24 | End: 2021-02-24

## 2021-02-24 RX ORDER — INSULIN LISPRO 100 [IU]/ML
INJECTION, SOLUTION INTRAVENOUS; SUBCUTANEOUS EVERY 6 HOURS
Status: DISCONTINUED | OUTPATIENT
Start: 2021-02-24 | End: 2021-03-03 | Stop reason: HOSPADM

## 2021-02-24 RX ORDER — DILTIAZEM HCL/D5W 125 MG/125
5 PLASTIC BAG, INJECTION (ML) INTRAVENOUS
Status: DISCONTINUED | OUTPATIENT
Start: 2021-02-24 | End: 2021-02-24

## 2021-02-24 RX ORDER — DIGOXIN 125 MCG
0.12 TABLET ORAL DAILY
Status: DISCONTINUED | OUTPATIENT
Start: 2021-02-25 | End: 2021-02-24

## 2021-02-24 RX ORDER — DIGOXIN 125 MCG
0.12 TABLET ORAL ONCE
Status: DISCONTINUED | OUTPATIENT
Start: 2021-02-24 | End: 2021-02-24

## 2021-02-24 RX ORDER — POTASSIUM CHLORIDE 1.5 G/1.77G
20 POWDER, FOR SOLUTION ORAL DAILY
Status: DISCONTINUED | OUTPATIENT
Start: 2021-02-24 | End: 2021-02-24

## 2021-02-24 RX ORDER — DILTIAZEM HCL/D5W 125 MG/125
0-15 PLASTIC BAG, INJECTION (ML) INTRAVENOUS
Status: DISCONTINUED | OUTPATIENT
Start: 2021-02-24 | End: 2021-02-28

## 2021-02-24 RX ORDER — DEXAMETHASONE SODIUM PHOSPHATE 4 MG/ML
4 INJECTION, SOLUTION INTRA-ARTICULAR; INTRALESIONAL; INTRAMUSCULAR; INTRAVENOUS; SOFT TISSUE EVERY 12 HOURS
Status: DISCONTINUED | OUTPATIENT
Start: 2021-02-24 | End: 2021-03-03 | Stop reason: HOSPADM

## 2021-02-24 RX ADMIN — Medication 10 ML: at 22:00

## 2021-02-24 RX ADMIN — HEPARIN SODIUM 5000 UNITS: 5000 INJECTION INTRAVENOUS; SUBCUTANEOUS at 03:30

## 2021-02-24 RX ADMIN — Medication 5 MG/HR: at 11:45

## 2021-02-24 RX ADMIN — HEPARIN SODIUM 5000 UNITS: 5000 INJECTION INTRAVENOUS; SUBCUTANEOUS at 16:58

## 2021-02-24 RX ADMIN — DEXTROSE MONOHYDRATE 150 ML/HR: 50 INJECTION, SOLUTION INTRAVENOUS at 16:58

## 2021-02-24 RX ADMIN — DEXTROSE MONOHYDRATE 150 ML/HR: 50 INJECTION, SOLUTION INTRAVENOUS at 09:47

## 2021-02-24 RX ADMIN — DEXAMETHASONE SODIUM PHOSPHATE 4 MG: 4 INJECTION, SOLUTION INTRA-ARTICULAR; INTRALESIONAL; INTRAMUSCULAR; INTRAVENOUS; SOFT TISSUE at 21:00

## 2021-02-24 RX ADMIN — DEXAMETHASONE SODIUM PHOSPHATE 4 MG: 4 INJECTION, SOLUTION INTRA-ARTICULAR; INTRALESIONAL; INTRAMUSCULAR; INTRAVENOUS; SOFT TISSUE at 05:35

## 2021-02-24 RX ADMIN — Medication 10 ML: at 00:05

## 2021-02-24 RX ADMIN — DEXTROSE MONOHYDRATE 12.5 G: 25 INJECTION, SOLUTION INTRAVENOUS at 09:57

## 2021-02-24 RX ADMIN — FAMOTIDINE 20 MG: 10 INJECTION INTRAVENOUS at 09:45

## 2021-02-24 RX ADMIN — Medication 10 ML: at 16:59

## 2021-02-24 RX ADMIN — LORAZEPAM 1 MG: 2 INJECTION INTRAMUSCULAR at 11:45

## 2021-02-24 RX ADMIN — PIPERACILLIN AND TAZOBACTAM 3.38 G: 3; .375 INJECTION, POWDER, LYOPHILIZED, FOR SOLUTION INTRAVENOUS at 21:00

## 2021-02-24 RX ADMIN — VANCOMYCIN HYDROCHLORIDE 750 MG: 750 INJECTION, POWDER, LYOPHILIZED, FOR SOLUTION INTRAVENOUS at 00:05

## 2021-02-24 RX ADMIN — AZITHROMYCIN DIHYDRATE 500 MG: 500 INJECTION, POWDER, LYOPHILIZED, FOR SOLUTION INTRAVENOUS at 11:45

## 2021-02-24 RX ADMIN — PIPERACILLIN AND TAZOBACTAM 3.38 G: 3; .375 INJECTION, POWDER, LYOPHILIZED, FOR SOLUTION INTRAVENOUS at 09:45

## 2021-02-24 RX ADMIN — PIPERACILLIN AND TAZOBACTAM 3.38 G: 3; .375 INJECTION, POWDER, LYOPHILIZED, FOR SOLUTION INTRAVENOUS at 00:05

## 2021-02-24 RX ADMIN — Medication 10 ML: at 05:36

## 2021-02-24 RX ADMIN — Medication 2.5 MG/HR: at 17:48

## 2021-02-24 RX ADMIN — DEXAMETHASONE SODIUM PHOSPHATE 4 MG: 4 INJECTION, SOLUTION INTRA-ARTICULAR; INTRALESIONAL; INTRAMUSCULAR; INTRAVENOUS; SOFT TISSUE at 00:05

## 2021-02-24 NOTE — CONSULTS
Gastroenterology Consult     Referring Physician: Magali Caballero MD     Consult Date: 2/24/2021     Subjective:     Chief Complaint:     History of Present Illness: Robinson Weinstein is a 80 y.o. male who is seen in consultation for PEG tube placement   Patient was admitted to the hospital from [address] [place] [address]. Patient found to be hypotensive and altered, with systolic BP in the 45T. He is also positive for COVID-19 infection. He has severe sepsis. Report gathered from chart and staff. PMH of atrial fibrillation, diabetes mellitus type 2, hypertension, hypothyroidism, polycythemia vera, spinal stenosis, TIA   -Patient seen in ICU, somnolent. He is on 3 L oxygen. NGT in place.      Past Medical History:   Diagnosis Date    Atrial fibrillation ([facility] [address]     Diabetes mellitus type 2, uncomplicated (HCC)     Hyperlipidemia     Hypertension     Hypothyroidism     Polycythemia vera ([facility] [address]     Prostatic hyperplasia     Spinal stenosis     Transient ischemic attack      Past Surgical History:   Procedure Laterality Date    HX OTHER SURGICAL      None      Family History   Problem Relation Age of Onset    Hypertension Mother     Stroke Mother     Lung Cancer Father     Melanoma Father      Social History     Tobacco Use    Smoking status: Unknown If Ever Smoked   Substance Use Topics    Alcohol use: Never     Frequency: Never     Binge frequency: Never      No Known Allergies  Current Facility-Administered Medications   Medication Dose Route Frequency    insulin lispro (HUMALOG) injection   SubCUTAneous Q6H    digoxin (LANOXIN) tablet 0.125 mg  0.125 mg Oral ONCE    potassium chloride (KLOR-CON) packet for solution 20 mEq  20 mEq Oral DAILY    dexamethasone (DECADRON) 4 mg/mL injection 4 mg  4 mg IntraVENous Q12H    dextrose 5% infusion  150 mL/hr IntraVENous CONTINUOUS    dextrose (D50W) injection syrg 12.5 g  25 mL IntraVENous PRN    NOREPINephrine (LEVOPHED) 8 mg in 5% dextrose 250mL (32 mcg/mL) infusion  0.5-16 mcg/min IntraVENous TITRATE    famotidine (PF) (PEPCID) 20 mg in 0.9% sodium chloride 10 mL injection  20 mg IntraVENous DAILY    Vancomycin dose per pharmacy protocol   Other Rx Dosing/Monitoring    midodrine (PROAMATINE) tablet 5 mg  5 mg Oral TID PRN    sodium chloride (NS) flush 5-40 mL  5-40 mL IntraVENous Q8H    sodium chloride (NS) flush 5-40 mL  5-40 mL IntraVENous PRN    azithromycin (ZITHROMAX) 500 mg in 0.9% sodium chloride 250 mL (VIAL-MATE)  500 mg IntraVENous DAILY    heparin (porcine) injection 5,000 Units  5,000 Units SubCUTAneous Q12H    glucose chewable tablet 16 g  4 Tab Oral PRN    dextrose (D50W) injection syrg 12.5-25 g  25-50 mL IntraVENous PRN    glucagon (GLUCAGEN) injection 1 mg  1 mg IntraMUSCular PRN    piperacillin-tazobactam (ZOSYN) 3.375 g in 0.9% sodium chloride (MBP/ADV) 100 mL MBP  3.375 g IntraVENous Q12H        Review of Systems:  A detailed 10 organ review of systems is obtained with pertinent positives as listed in the History of Present Illness and Past Medical History. All others are negative. Objective:     Physical Exam:  Visit Vitals  /81   Pulse (!) 102   Temp 99 °F (37.2 °C)   Resp 23   Ht 6' (1.829 m)   Wt 76 kg (167 lb 8.8 oz)   SpO2 99%   BMI 22.72 kg/m²        Skin:  Extremities and face reveal no rashes. No vanegas erythema. No telangiectasias on the chest wall. HEENT: Sclerae anicteric. Extra-occular muscles are intact. No oral ulcers. No abnormal pigmentation of the lips. The neck is supple. Cardiovascular: Regular rate and rhythm. No murmurs, gallops, or rubs. PMI nondisplaced. Carotids without bruits. Respiratory:  Comfortable breathing with no accessory muscle use. Clear breath sounds with no wheezes, rales, or rhonchi. GI:  Abdomen nondistended, soft, and nontender. Normal active bowel sounds. No enlargement of the liver or spleen. No masses palpable.   Rectal:  Deferred  Musculoskeletal:  No pitting edema of the lower legs. Extremities have good range of motion. No costovertebral tenderness. Neurological:  Asleep during rounds. Psychiatric:  Unable to assess at this time. Patient has dementia. Lymphatic:  No cervical or supraclavicular adenopathy. Lab/Data Review:  Recent Results (from the past 24 hour(s))   VANCOMYCIN, RANDOM    Collection Time: 02/23/21  3:57 PM   Result Value Ref Range    Vancomycin, random 15.5 ug/mL    Reported dose date Not available      Reported dose: Not available Units   GLUCOSE, POC    Collection Time: 02/23/21  6:32 PM   Result Value Ref Range    Glucose (POC) 201 (H) 65 - 100 mg/dL    Performed by Anjana Howell    GLUCOSE, POC    Collection Time: 02/23/21  8:34 PM   Result Value Ref Range    Glucose (POC) 162 (H) 65 - 100 mg/dL    Performed by [address], POC    Collection Time: 02/23/21  9:27 PM   Result Value Ref Range    Glucose (POC) 254 (H) 65 - 100 mg/dL    Performed by [address], POC    Collection Time: 02/23/21 10:48 PM   Result Value Ref Range    Glucose (POC) 256 (H) 65 - 100 mg/dL    Performed by [address], POC    Collection Time: 02/23/21 11:45 PM   Result Value Ref Range    Glucose (POC) 197 (H) 65 - 100 mg/dL    Performed by [facility]    CBC WITH AUTOMATED DIFF    Collection Time: 02/24/21  3:00 AM   Result Value Ref Range    WBC 12.7 (H) 4.1 - 11.1 K/uL    RBC 2.73 (L) 4.10 - 5.70 M/uL    HGB 11.3 (L) 12.1 - 17.0 g/dL    HCT 34.6 (L) 36.6 - 50.3 %    .7 (H) 80.0 - 99.0 FL    MCH 41.4 (H) 26.0 - 34.0 PG    MCHC 32.7 30.0 - 36.5 g/dL    RDW 15.3 (H) 11.5 - 14.5 %    PLATELET 995 209 - 342 K/uL    MPV 12.2 8.9 - 12.9 FL    NRBC 0.0 0  WBC    ABSOLUTE NRBC 0.00 0.00 - 0.01 K/uL    NEUTROPHILS 94 (H) 32 - 75 %    LYMPHOCYTES 5 (L) 12 - 49 %    MONOCYTES 1 (L) 5 - 13 %    EOSINOPHILS 0 0 - 7 %    BASOPHILS 0 0 - 1 %    IMMATURE GRANULOCYTES 0 0.0 - 0.5 %    ABS.  NEUTROPHILS 12.0 (H) 1.8 - 8.0 K/UL ABS. LYMPHOCYTES 0.6 (L) 0.8 - 3.5 K/UL    ABS. MONOCYTES 0.2 0.0 - 1.0 K/UL    ABS. EOSINOPHILS 0.0 0.0 - 0.4 K/UL    ABS. BASOPHILS 0.0 0.0 - 0.1 K/UL    ABS. IMM. GRANS. 0.0 0.00 - 0.04 K/UL    DF AUTOMATED     RENAL FUNCTION PANEL    Collection Time: 02/24/21  3:30 AM   Result Value Ref Range    Sodium 158 (H) 136 - 145 mmol/L    Potassium 3.5 3.5 - 5.1 mmol/L    Chloride 132 (H) 97 - 108 mmol/L    CO2 19 (L) 21 - 32 mmol/L    Anion gap 7 5 - 15 mmol/L    Glucose 82 65 - 100 mg/dL     (H) 6 - 20 mg/dL    Creatinine 4.38 (H) 0.70 - 1.30 mg/dL    BUN/Creatinine ratio 31 (H) 12 - 20      GFR est AA 16 (L) >60 ml/min/1.73m2    GFR est non-AA 13 (L) >60 ml/min/1.73m2    Calcium 8.2 (L) 8.5 - 10.1 mg/dL    Phosphorus 3.3 2.6 - 4.7 mg/dL    Albumin 1.9 (L) 3.5 - 5.0 g/dL   C REACTIVE PROTEIN, QT    Collection Time: 02/24/21  3:30 AM   Result Value Ref Range    C-Reactive protein 4.33 (H) 0.00 - 0.60 mg/dL   PROCALCITONIN    Collection Time: 02/24/21  3:30 AM   Result Value Ref Range    Procalcitonin 2.38 (H) 0 ng/mL   GLUCOSE, POC    Collection Time: 02/24/21  7:25 AM   Result Value Ref Range    Glucose (POC) 79 65 - 100 mg/dL    Performed by Earnest Harada    GLUCOSE, POC    Collection Time: 02/24/21  8:59 AM   Result Value Ref Range    Glucose (POC) 86 65 - 100 mg/dL    Performed by Earnest Harada    GLUCOSE, POC    Collection Time: 02/24/21  9:46 AM   Result Value Ref Range    Glucose (POC) 63 (L) 65 - 100 mg/dL    Performed by Earnest Harada    GLUCOSE, POC    Collection Time: 02/24/21 10:22 AM   Result Value Ref Range    Glucose (POC) 110 (H) 65 - 100 mg/dL    Performed by Earnest Harada         XR CHEST PORT   Final Result   Feeding tube tip at the GE junction. This should be advanced into   the stomach. Airspace opacities in the lungs, left greater than right, not   significantly changed.       XR CHEST PORT   Final Result      US RETROPERITONEUM COMP   Final Result   Small right renal cyst. Mild left hydronephrosis of unclear   etiology. Gonzales catheter in the bladder. Please see full report. XR CHEST PORT   Final Result   New left lung opacities, nonspecific, but could represent an infectious process   in the appropriate clinical setting. Asymmetric edema could appear similar,   though is considered less likely. XR CHEST PORT    (Results Pending)          Assessment/Plan:   Poor oral intake and inadequate nutrition. Recommend tube feeding for now once NGT is in place. Speech and dietary following. Plan for PEG placement once acute illness is over. Active Problems:    Pneumonia (2/21/2021)      Renal failure (2/21/2021)      Sepsis ([facility] [address] (2/21/2021)         IP CONSULT TO HOSPITALIST  IP CONSULT TO CARDIOLOGY  IP CONSULT TO PULMONOLOGY  IP CONSULT TO INFECTIOUS DISEASES  IP CONSULT TO NEPHROLOGY  IP CONSULT TO GASTROENTEROLOGY  Patient discussed with Dr Gregorio Luna and agrees to above impression and plan.   Thank you for allowing me to participate in this patients care    Monet MENAP-C  Cc Referring Physician   Thu Radford MD

## 2021-02-24 NOTE — PROGRESS NOTES
Progress Note    Patient: Horacio Rizo MRN: [id number]  SSN: [id number]    YOB: 1934  Age: 80 y.o. Sex: male      Admit Date: 2/21/2021    LOS: 3 days     Subjective:     Patient was transferred from the floor to ICU for septic shock started on IVF pressors subsequently weaned off    Objective:     Vitals:    02/24/21 0500 02/24/21 0600 02/24/21 0700 02/24/21 1100   BP: (!) 144/98 124/81     Pulse: (!) 108 (!) 102     Resp: 26 23     Temp:   98.4 °F (36.9 °C) 98.7 °F (37.1 °C)   SpO2: 100% 99%     Weight:       Height:            Intake and Output:  Current Shift: No intake/output data recorded. Last three shifts: 02/22 1901 - 02/24 0700  In: 420.8 [I.V.:420.8]  Out: 2125 [Urine:2125]    Physical Exam:   Lethargic    Eyes:   Lethargic   Ears:  Normal TMs and external ear canals both ears. Nose: Nares normal. Septum midline. Mucosa normal. No drainage or sinus tenderness. Mouth/Throat: Lips, mucosa, and tongue normal. Teeth and gums normal.   Neck: Supple, symmetrical, trachea midline, no adenopathy, thyroid: no enlargment/tenderness/nodules, no carotid bruit and no JVD. Back:   Symmetric, no curvature. ROM normal. No CVA tenderness. Lungs:   Clear to auscultation bilaterally. Heart:  Regular rate and rhythm, S1, S2 normal, no murmur, click, rub or gallop. Abdomen:   Soft, non-tender. Bowel sounds normal. No masses,  No organomegaly. Extremities: lethargic atraumatic, no cyanosis or edema. Pulses: 2+ and symmetric all extremities. Skin: Skin color, texture, turgor normal. No rashes or lesions   Lymph nodes: Cervical, supraclavicular, and axillary nodes normal.   Neurologic: lethargic       Lab/Data Review: All lab results for the last 24 hours reviewed.      Recent Results (from the past 24 hour(s))   GLUCOSE, POC    Collection Time: 02/23/21  3:52 PM   Result Value Ref Range    Glucose (POC) 181 (H) 65 - 100 mg/dL    Performed by LUCILLE White Collection Time: 02/23/21  3:57 PM   Result Value Ref Range    Vancomycin, random 15.5 ug/mL    Reported dose date Not available      Reported dose: Not available Units   GLUCOSE, POC    Collection Time: 02/23/21  6:32 PM   Result Value Ref Range    Glucose (POC) 201 (H) 65 - 100 mg/dL    Performed by Connor De La Cruz    GLUCOSE, POC    Collection Time: 02/23/21  8:34 PM   Result Value Ref Range    Glucose (POC) 162 (H) 65 - 100 mg/dL    Performed by [address], POC    Collection Time: 02/23/21  9:27 PM   Result Value Ref Range    Glucose (POC) 254 (H) 65 - 100 mg/dL    Performed by [address], POC    Collection Time: 02/23/21 10:48 PM   Result Value Ref Range    Glucose (POC) 256 (H) 65 - 100 mg/dL    Performed by [address], POC    Collection Time: 02/23/21 11:45 PM   Result Value Ref Range    Glucose (POC) 197 (H) 65 - 100 mg/dL    Performed by [facility]    CBC WITH AUTOMATED DIFF    Collection Time: 02/24/21  3:00 AM   Result Value Ref Range    WBC 12.7 (H) 4.1 - 11.1 K/uL    RBC 2.73 (L) 4.10 - 5.70 M/uL    HGB 11.3 (L) 12.1 - 17.0 g/dL    HCT 34.6 (L) 36.6 - 50.3 %    .7 (H) 80.0 - 99.0 FL    MCH 41.4 (H) 26.0 - 34.0 PG    MCHC 32.7 30.0 - 36.5 g/dL    RDW 15.3 (H) 11.5 - 14.5 %    PLATELET 861 221 - 238 K/uL    MPV 12.2 8.9 - 12.9 FL    NRBC 0.0 0  WBC    ABSOLUTE NRBC 0.00 0.00 - 0.01 K/uL    NEUTROPHILS 94 (H) 32 - 75 %    LYMPHOCYTES 5 (L) 12 - 49 %    MONOCYTES 1 (L) 5 - 13 %    EOSINOPHILS 0 0 - 7 %    BASOPHILS 0 0 - 1 %    IMMATURE GRANULOCYTES 0 0.0 - 0.5 %    ABS. NEUTROPHILS 12.0 (H) 1.8 - 8.0 K/UL    ABS. LYMPHOCYTES 0.6 (L) 0.8 - 3.5 K/UL    ABS. MONOCYTES 0.2 0.0 - 1.0 K/UL    ABS. EOSINOPHILS 0.0 0.0 - 0.4 K/UL    ABS. BASOPHILS 0.0 0.0 - 0.1 K/UL    ABS. IMM.  GRANS. 0.0 0.00 - 0.04 K/UL    DF AUTOMATED     RENAL FUNCTION PANEL    Collection Time: 02/24/21  3:30 AM   Result Value Ref Range    Sodium 158 (H) 136 - 145 mmol/L    Potassium 3.5 3.5 - 5.1 mmol/L    Chloride 132 (H) 97 - 108 mmol/L    CO2 19 (L) 21 - 32 mmol/L    Anion gap 7 5 - 15 mmol/L    Glucose 82 65 - 100 mg/dL     (H) 6 - 20 mg/dL    Creatinine 4.38 (H) 0.70 - 1.30 mg/dL    BUN/Creatinine ratio 31 (H) 12 - 20      GFR est AA 16 (L) >60 ml/min/1.73m2    GFR est non-AA 13 (L) >60 ml/min/1.73m2    Calcium 8.2 (L) 8.5 - 10.1 mg/dL    Phosphorus 3.3 2.6 - 4.7 mg/dL    Albumin 1.9 (L) 3.5 - 5.0 g/dL   C REACTIVE PROTEIN, QT    Collection Time: 02/24/21  3:30 AM   Result Value Ref Range    C-Reactive protein 4.33 (H) 0.00 - 0.60 mg/dL   PROCALCITONIN    Collection Time: 02/24/21  3:30 AM   Result Value Ref Range    Procalcitonin 2.38 (H) 0 ng/mL   GLUCOSE, POC    Collection Time: 02/24/21  7:25 AM   Result Value Ref Range    Glucose (POC) 79 65 - 100 mg/dL    Performed by Aurora John    GLUCOSE, POC    Collection Time: 02/24/21  8:59 AM   Result Value Ref Range    Glucose (POC) 86 65 - 100 mg/dL    Performed by Aurora John    GLUCOSE, POC    Collection Time: 02/24/21  9:46 AM   Result Value Ref Range    Glucose (POC) 63 (L) 65 - 100 mg/dL    Performed by Aurora John    GLUCOSE, POC    Collection Time: 02/24/21 10:22 AM   Result Value Ref Range    Glucose (POC) 110 (H) 65 - 100 mg/dL    Performed by Aurora John          Assessment:     Active Problems:    Pneumonia (2/21/2021)      Renal failure (2/21/2021)      Sepsis ([address] (2/21/2021)    Aspiration pneumonia  Septic shock  Acute kidney injury  uti  DKA improved  Plan:     Gi for peg tube placement      Critical care time of 45 minutes  I discussed with the daughter and the spouse patient has an advanced directive with DNR  Signed By: Laurie Zapata MD     February 24, 2021

## 2021-02-24 NOTE — PROGRESS NOTES
Infectious Disease Progress Note           Subjective:   No change in clinical status, remains stable, and confused. No acute events since last seen   Objective:   Physical Exam:     Visit Vitals  /81   Pulse (!) 102   Temp 99 °F (37.2 °C)   Resp 23   Ht 6' (1.829 m)   Wt 167 lb 8.8 oz (76 kg)   SpO2 99%   BMI 22.72 kg/m²    O2 Flow Rate (L/min): 2 l/min O2 Device: Nasal cannula    Temp (24hrs), Av.2 °F (36.8 °C), Min:97.1 °F (36.2 °C), Max:99 °F (37.2 °C)    No intake/output data recorded.  1901 -  0700  In: 420.8 [I.V.:420.8]  Out:  [Urine:]    General: NAD, confused, not following commands   HEENT: JUAN CARLOS, Moist mucosa   Lungs: Decreased at the bases, no wheeze/rhonchi  Heart: S1S2+, RRR, no murmur  Abdo: Soft, NT, ND, +BS   : + indwelling benjamin cath   Exts: Trace edema, + pulses b/l   Skin: No wounds, No rashes or lesions    Data Review:       Recent Days:  Recent Labs     21  0300   WBC 12.7*   HGB 11.3*   HCT 34.6*        Recent Labs     21  0330 21  0925   * 141*   CREA 4.38* 5.04*       Lab Results   Component Value Date/Time    C-Reactive protein 4.33 (H) 2021 03:30 AM        Microbiology   - Urine Cx : Pending   - Blood Cx : GPC in all 4 bottles     Diagnostics   CXR Results  (Last 48 hours)               21 1229  XR CHEST PORT Final result    Impression:  Feeding tube tip at the GE junction. This should be advanced into   the stomach. Airspace opacities in the lungs, left greater than right, not   significantly changed. Narrative:  Chest, frontal view, 2021       History: NG tube placement. Comparison: Including chest 2021. Findings: Feeding tube side-port is at the distal thoracic esophagus and tip at   the GE junction. This should be advanced further into the stomach. Right-sided   PICC line tip is at the mid to distal SVC. The cardiac silhouette is stable.     The lungs are adequately expanded. No hydrostatic edema is seen. Airspace   opacities at the left mid to lower lung and right base are not significantly   changed. No pleural effusion or pneumothorax is identified. Calcified   granulomas are again noted. The osseous structures are stable. 02/23/21 0435  XR CHEST PORT Final result    Narrative:  Chest single view. Comparison single view chest February 21, 2021       Stable right PICC   Patchy lower lung reticular markings predominant left lower lobe lung, perhaps   minor improvement in aeration compared to prior imaging. Cardiac and mediastinal   structures unchanged. Thoracic aorta atherosclerosis. No pneumothorax or sizable   pleural effusion. Assessment/Plan     1. Sepsis due  UTI, suspected aspiration PNA/line infection        Afebrile today, moderate leukocytosis on routine labs        Currently on Vanc and Zosyn. Routine labs in the morning       2. Coag neg staph bacteremia in the setting of right arm PICC line      Isolated from 1/4 BCx btles collected in the ED, repeat  On 02/23 is neg       On day # 2/7 of Vanc     3. UTI, abnormal UA, K. Pneumoniae and E.faecalis isolated from Cx     + benjamin cath. On day # 4 of Zosyn     4. Acute renal failure: Cr trending down, will continue to renally dose Abx    5. AMS: Remains altered, not following commands     6.  Suspected aspiration PNA: W new LLL infiltrate on admission CXR       COVID 19 test has not resulted, should've, reordered, d/c droplet precaution if neg     Elisa Vang MD    2/24/2021

## 2021-02-24 NOTE — PROGRESS NOTES
FOUR EYES SKIN ASSESSMENT:  PT HAS MULTIPLE BRUISES ON BOTH ARMS; AN OLD SKIN TEAR ON RIGHT ELBOW. HE HAS REDNESS TO INNER THIGHS AND TESTICLES. REDNESS (BLANCHABLE) TO LOWER BACK AND SACRAL AREA. HE HAS A SKIN TEAR ON LEFT LOWER LEG. BOTH HEELS REDDENED AND LEFT IS SLIGHTLY BOGGY. BOOTS IN PLACE BILATERALLY. FOUR EYE ASSESSMENT COMPLETED WITH JAMEE GREEN RN; WHOM AGREES WITH ABOVE ASSESSMENT.

## 2021-02-24 NOTE — PROGRESS NOTES
Progress Note      2/25/21  9:50AM  NAME: Rebecca Baker   MRN:  [id number]   Admit Diagnosis: Sepsis ([facility] [address] [A41.9]  Pneumonia [J18.9]  Renal failure [N19]      Problem List:   -Atrial fibrillation  -Hypertension  -Dyslipidemia  -Hypothyroidism  -Sepsis  -History of TIA  -Spinal stenosis       Assessment/Plan:   2/25/21  - patient resting in bed with eyes opened. Disoriented x 3. No acute distress noted. - atrial fibrillation on telemetry 80-90 bpm. controlled  - no acute cardiovascular events overnight  - troponin continues to trend downward  - continue cardizem IV with goal heart rate less than 100bpm  - will continue conservative cardiovascular management at this time and monitor patient during hospitalization.  ==========================================================  2/24/21  - patient observed lying in bed with eyes closed. Arouses when name is called. - - non-verbal, no acute distress noted. - atrial fibrillation on telemetry 110-120bpm.  - no acute events overnight.   - troponin trending downward  - will start on digoxin with caution  with the goal to control heart rate less than 100bpm to minimize the risk of tachycardia induced cardiomyopathy.  - Considering renal insufficiency I will give him only one dose and replace K+  aggressively  - if the medical management fails we will consider SHELIA cardioversion.  ==========================================================  -Follow-up visit from cardiology secondary to borderline increased troponin  -Patient is lying comfortably in the bed but did not respond or engage with verbal communication.  -Monitor shows atrial fibrillation with controlled heart rate  -Normal ejection fraction of 79%.  -No acute cardiac issue based on my overall cardiac assessment.  -This was a limited examination this morning as patient has tenderness which could be consistent with COVID-19 infection.  -No further cardiovascular testing warranted at this time based on my overall cardiac assessment and we will continue to watch him while he is in the hospital along with the team with conservative approach         []       High complexity decision making was performed in this patient at high risk for decompensation with multiple organ involvement. Subjective:     Tanvir Kaur is an 59-year-old -American male brought to our facility following altered mental status and found to have COVID-19 infection. Neurology consult was invited secondary to history of atrial fibrillation which is currently under control. Also has a history of diabetes mellitus hypertension hypothyroidism and polycythemia vera. And is nonverbal unable to communicate or engage with verbal communication and did not respond to vocal command since I am seeing him. Monitor shows atrial fibrillation with controlled heart rate in the 80s and 90s and there is no acute cardiac issue for me to change my conservative medical management at this time. Discussed with RN events overnight. Review of Systems: Patient is non-verbal. No acute distress noted. Objective:      Physical Exam:    Last 24hrs VS reviewed since prior progress note. Most recent are:    Visit Vitals  /81   Pulse (!) 102   Temp 98.4 °F (36.9 °C)   Resp 23   Ht 6' (1.829 m)   Wt 76 kg (167 lb 8.8 oz)   SpO2 99%   BMI 22.72 kg/m²       Intake/Output Summary (Last 24 hours) at 2/24/2021 0950  Last data filed at 2/24/2021 0536  Gross per 24 hour   Intake 420.83 ml   Output 1525 ml   Net -1104.17 ml        General Appearance: ill-appearing, no acute distress noted. PMH/SH reviewed - no change compared to H&P    Data Review    Telemetry: atrial fibrillation    Result status: Final result   · LV: Calculated LVEF is 79%. Normal cavity size, wall thickness, systolic function (ejection fraction normal) and diastolic function. Wall motion: normal. Normal left ventricular strain. · LA: Moderately dilated left atrium.   · RV: Mildly dilated right ventricle. Mildly reduced systolic function. · RA: Mildly dilated right atrium. · AV: Aortic valve leaflet calcification present. Mild aortic valve regurgitation is present. · MV: Mild mitral valve regurgitation is present. · TV: Mild tricuspid valve regurgitation is present. RVSP 41.  · Pericardium: Trivial pericardial effusion. Echo Findings    Left Ventricle Normal cavity size, wall thickness, systolic function (ejection fraction normal) and diastolic function. The muscle mass is normal. The cavity shape is normal. Wall motion: normal. The calculated EF is 79%. End-systolic volume is normal. Normal left ventricular strain. Normal left ventricular diastolic pressure. End-diastolic volume is normal.   Left Atrium Moderately dilated left atrium. Right Ventricle Mildly dilated right ventricle. Mildly reduced systolic function. Right Atrium Mildly dilated right atrium. Interatrial Septum Interatrial septum not well visualized   Aortic Valve No stenosis. There is leaflet calcification. Mild aortic valve regurgitation. Mitral Valve Normal valve structure and no stenosis. Mild regurgitation. Tricuspid Valve Normal valve structure and no stenosis. Mild regurgitation. Pulmonic Valve Pulmonic valve not well visualized. Aorta The aorta was not well visualized. Pulmonary Artery Pulmonary arteries not well visualized. IVC/Hepatic Veins Normal structure. Normal central venous pressure (3 mmHg); IVC diameter is less than 21 mm and collapses more than 50% with respiration. Pericardium Trivial pericardial effusion noted. Carotid Duplex 2/5/21:    Right Carotid    There is mild stenosis in the right ICA (<50%). The right vertebral is antegrade. Left Carotid    There is mild stenosis in the left ICA (<50%). The left vertebral is antegrade. Results for Kirsten Troy (MRN [id number]) as of 2/25/2021 10:13   Ref.  Range 2/25/2021 04:30   Sodium Latest Ref Range: 136 - 145 mmol/L 150 (H)   Potassium Latest Ref Range: 3.5 - 5.1 mmol/L 3.5   Chloride Latest Ref Range: 97 - 108 mmol/L 123 (H)   CO2 Latest Ref Range: 21 - 32 mmol/L 20 (L)   Anion gap Latest Ref Range: 5 - 15 mmol/L 7   Glucose Latest Ref Range: 65 - 100 mg/dL 367 (H)   BUN Latest Ref Range: 6 - 20 mg/dL 120 (H)   Creatinine Latest Ref Range: 0.70 - 1.30 mg/dL 3.88 (H)   BUN/Creatinine ratio Latest Ref Range: 12 - 20   31 (H)   Calcium Latest Ref Range: 8.5 - 10.1 mg/dL 7.8 (L)   Phosphorus Latest Ref Range: 2.6 - 4.7 mg/dL 3.2   GFR est non-AA Latest Ref Range: >60 ml/min/1.73m2 15 (L)   GFR est AA Latest Ref Range: >60 ml/min/1.73m2 18 (L)   Albumin Latest Ref Range: 3.5 - 5.0 g/dL 1.8 (L)   Results for Bailey Lail (MRN [id number]) as of 2/25/2021 10:13   Ref. Range 2/25/2021 04:30   WBC Latest Ref Range: 4.1 - 11.1 K/uL 9.3   RBC Latest Ref Range: 4.10 - 5.70 M/uL 2.63 (L)   HGB Latest Ref Range: 12.1 - 17.0 g/dL 11.1 (L)   HCT Latest Ref Range: 36.6 - 50.3 % 33.4 (L)   MCV Latest Ref Range: 80.0 - 99.0 .0 (H)   MCH Latest Ref Range: 26.0 - 34.0 PG 42.2 (H)   MCHC Latest Ref Range: 30.0 - 36.5 g/dL 33.2   RDW Latest Ref Range: 11.5 - 14.5 % 15.6 (H)   PLATELET Latest Ref Range: 150 - 400 K/uL 126 (L)   MPV Latest Ref Range: 8.9 - 12.9 FL 12.6   NEUTROPHILS Latest Ref Range: 32 - 75 % 93 (H)   LYMPHOCYTES Latest Ref Range: 12 - 49 % 5 (L)   MONOCYTES Latest Ref Range: 5 - 13 % 2 (L)   EOSINOPHILS Latest Ref Range: 0 - 7 % 0   BASOPHILS Latest Ref Range: 0 - 1 % 0   IMMATURE GRANULOCYTES Latest Ref Range: 0.0 - 0.5 % 0   DF Latest Units:   AUTOMATED   ABS. NEUTROPHILS Latest Ref Range: 1.8 - 8.0 K/UL 8.7 (H)   ABS. IMM. GRANS. Latest Ref Range: 0.00 - 0.04 K/UL 0.0   ABS. LYMPHOCYTES Latest Ref Range: 0.8 - 3.5 K/UL 0.5 (L)   ABS. MONOCYTES Latest Ref Range: 0.0 - 1.0 K/UL 0.1   ABS. EOSINOPHILS Latest Ref Range: 0.0 - 0.4 K/UL 0.0   ABS.  BASOPHILS Latest Ref Range: 0.0 - 0.1 K/UL 0.0     Current IP Meds  Comment  Clarissa  (From admission, onward)     Last edited by  on  at    Start   Stop Status Route Frequency Ordered   02/24/21 2100  dexamethasone (DECADRON) 4 mg/mL injection 4 mg    Discontinue    -- Dispensed IV EVERY 12 HOURS 02/24/21 1219   02/24/21 1700  dilTIAZem (CARDIZEM) 125 mg/125 mL (1 mg/mL) dextrose 5% infusion    Discontinue    -- Verified IV TITRATE 02/24/21 1646   02/24/21 1200  LORazepam (ATIVAN) injection 1 mg      02/24 1145 Completed IV ONCE 02/24/21 1018   02/24/21 1200  insulin lispro (HUMALOG) injection (CORRECTIONAL SCALE LISPRO PANEL)    Discontinue    -- Dispensed SC EVERY 6 HOURS 02/24/21 1018   02/23/21 1737  dextrose (D50W) injection syrg 12.5 g    Discontinue    -- Verified IV AS NEEDED 02/23/21 1741   02/23/21 1500  dextrose 5% infusion    Discontinue    -- Dispensed IV CONTINUOUS 02/23/21 1458   02/22/21 1600  midodrine (PROAMATINE) tablet 5 mg    Discontinue    -- Verified PO 3 TIMES DAILY AS NEEDED 02/22/21 1523   02/22/21 1400  famotidine (PF) (PEPCID) 20 mg in 0.9% sodium chloride 10 mL injection    Discontinue    -- Dispensed IV DAILY 02/22/21 1307   02/22/21 1341  Vancomycin dose per pharmacy protocol    Discontinue    -- Dispensed OTHER RX DOSING/MONITORING 02/22/21 1341   02/22/21 0900  azithromycin (ZITHROMAX) 500 mg in 0.9% sodium chloride 250 mL (VIAL-MATE)    Discontinue    -- Dispensed IV DAILY 02/21/21 1457   02/22/21 0900  NOREPINephrine (LEVOPHED) 8 mg in 5% dextrose 250mL (32 mcg/mL) infusion    Discontinue    -- Verified IV TITRATE 02/22/21 0806   02/21/21 2100  piperacillin-tazobactam (ZOSYN) 3.375 g in 0.9% sodium chloride (MBP/ADV) 100 mL MBP    Discontinue    03/07 2059 Dispensed IV EVERY 12 HOURS 02/21/21 1516   02/21/21 1510  glucose chewable tablet 16 g (HYPOGLYCEMIC PROTOCOL)    Discontinue    -- Dispensed PO AS NEEDED 02/21/21 1512   02/21/21 1510  dextrose (D50W) injection syrg 12.5-25 g (HYPOGLYCEMIC PROTOCOL)    Discontinue    -- Dispensed IV AS NEEDED 02/21/21 1512   02/21/21 1510  glucagon (GLUCAGEN) injection 1 mg (HYPOGLYCEMIC PROTOCOL)    Discontinue    -- Verified IM AS NEEDED 02/21/21 1512   02/21/21 1500  sodium chloride (NS) flush 5-40 mL (SALINE LOCK FLUSH PANEL)    Discontinue    -- Dispensed IV EVERY 8 HOURS 02/21/21 1457   02/21/21 1500  heparin (porcine) injection 5,000 Units    Discontinue    -- Dispensed SC EVERY 12 HOURS 02/21/21 1457   02/21/21 1450  sodium chloride (NS) flush 5-40 mL (SALINE LOCK FLUSH PANEL)    Discontinue    -- Dispensed IV AS NEEDED 02/21/21 [address] Deonte Ardon NP

## 2021-02-24 NOTE — PROGRESS NOTES
FOUR EYES SKIN ASSESSMENT:  PT HAS MULTIPLE BRUISES ON BOTH ARMS; AN OLD SKIN TEAR ON RIGHT ELBOW. HE HAS REDNESS TO INNER THIGHS AND TESTICLES. REDNESS (BLANCHABLE) TO LOWER BACK AND SACRAL AREA. HE HAS A SKIN TEAR ON LEFT LOWER LEG. BOTH HEELS REDDENED AND LEFT IS SLIGHTLY BOGGY. BOOTS IN PLACE BILATERALLY. FOUR EYE ASSESSMENT COMPLETED WITH JAMEE GREEN RN.

## 2021-02-24 NOTE — PROGRESS NOTES
Patient is seen and examined  In Covid 23 isolation  Discussed with ICU RN    Past Medical History:   Diagnosis Date    Atrial fibrillation ([facility] [address]     Diabetes mellitus type 2, uncomplicated ([facility] [address]     Hyperlipidemia     Hypertension     Hypothyroidism     [facility]     Prostatic hyperplasia     Spinal stenosis     Transient ischemic attack       Past Surgical History:   Procedure Laterality Date    HX OTHER SURGICAL      None     Family History   Problem Relation Age of Onset    Hypertension Mother     Stroke Mother     Lung Cancer Father     Melanoma Father       Social History     Tobacco Use    Smoking status: Unknown If Ever Smoked   Substance Use Topics    Alcohol use: Never     Frequency: Never     Binge frequency: Never       Current Facility-Administered Medications   Medication Dose Route Frequency Provider Last Rate Last Admin    insulin lispro (HUMALOG) injection   SubCUTAneous Q6H Ritu LENZ MD   Stopped at 02/24/21 1200    digoxin (LANOXIN) tablet 0.125 mg  0.125 mg Oral ONCE Matthew He MD        potassium chloride (KLOR-CON) packet for solution 20 mEq  20 mEq Oral DAILY Matthew He MD        dexamethasone (DECADRON) 4 mg/mL injection 4 mg  4 mg IntraVENous Q12H Joselito Haile MD        dextrose 5% infusion  150 mL/hr IntraVENous CONTINUOUS Ritu LENZ  mL/hr at 02/24/21 0947 150 mL/hr at 02/24/21 0947    dextrose (D50W) injection syrg 12.5 g  25 mL IntraVENous PRN Ritu LENZ MD   12.5 g at 02/24/21 0957    NOREPINephrine (LEVOPHED) 8 mg in 5% dextrose 250mL (32 mcg/mL) infusion  0.5-16 mcg/min IntraVENous TITRATE Ritu LENZ MD   Stopped at 02/22/21 0800    famotidine (PF) (PEPCID) 20 mg in 0.9% sodium chloride 10 mL injection  20 mg IntraVENous DAILY Joselito Haile MD   20 mg at 02/24/21 0945    Vancomycin dose per pharmacy protocol   Other Rx Dosing/Monitoring Christian Little MD        midodrine (PROAMATINE) tablet 5 mg  5 mg Oral TID PRN Holly Bustamante MD        sodium chloride (NS) flush 5-40 mL  5-40 mL IntraVENous Q8H Candelario Alvarez MD   10 mL at 02/24/21 0536    sodium chloride (NS) flush 5-40 mL  5-40 mL IntraVENous PRN Yaw Ramos MD        azithromycin (ZITHROMAX) 500 mg in 0.9% sodium chloride 250 mL (VIAL-MATE)  500 mg IntraVENous DAILY Yaw Ramos MD   500 mg at 02/24/21 1145    heparin (porcine) injection 5,000 Units  5,000 Units SubCUTAneous Q12H Yaw Ramos MD   5,000 Units at 02/24/21 0330    glucose chewable tablet 16 g  4 Tab Oral PRN Yaw Ramos MD        dextrose (D50W) injection syrg 12.5-25 g  25-50 mL IntraVENous PRN Yaw Ramos MD        glucagon (GLUCAGEN) injection 1 mg  1 mg IntraMUSCular PRN Yaw Ramos MD        piperacillin-tazobactam (ZOSYN) 3.375 g in 0.9% sodium chloride (MBP/ADV) 100 mL MBP  3.375 g IntraVENous Q12H Holly Bustamante  mL/hr at 02/24/21 0945 3.375 g at 02/24/21 0945        Review of Systems   Unable to perform ROS: Patient nonverbal       Objective     Vital signs for last 24 hours:  Visit Vitals  /81   Pulse (!) 102   Temp 98.7 °F (37.1 °C)   Resp 23   Ht 6' (1.829 m)   Wt 76 kg (167 lb 8.8 oz)   SpO2 99%   BMI 22.72 kg/m²       Intake/Output this shift:  Current Shift: No intake/output data recorded. Last 3 Shifts: 02/22 1901 - 02/24 0700  In: 420.8 [I.V.:420.8]  Out: 2125 [Urine:2125]  Physical Exam   Constitutional: He appears well-developed and well-nourished. HENT:   Head: Normocephalic. Pulmonary/Chest: Effort normal and breath sounds normal.   Abdominal: Soft. Bowel sounds are normal.   Skin: Skin is warm and dry.    Nonverbal, lethargic  On oxygen by nasal cannula  D5W ongoing at 150 mL/h  Data Review:   Recent Results (from the past 24 hour(s))   GLUCOSE, POC    Collection Time: 02/23/21  3:52 PM   Result Value Ref Range    Glucose (POC) 181 (H) 65 - 100 mg/dL    Performed by Janis Noonan VANCOMYCIN, RANDOM    Collection Time: 02/23/21  3:57 PM   Result Value Ref Range    Vancomycin, random 15.5 ug/mL    Reported dose date Not available      Reported dose: Not available Units   GLUCOSE, POC    Collection Time: 02/23/21  6:32 PM   Result Value Ref Range    Glucose (POC) 201 (H) 65 - 100 mg/dL    Performed by Kim Garcia    GLUCOSE, POC    Collection Time: 02/23/21  8:34 PM   Result Value Ref Range    Glucose (POC) 162 (H) 65 - 100 mg/dL    Performed by [address], POC    Collection Time: 02/23/21  9:27 PM   Result Value Ref Range    Glucose (POC) 254 (H) 65 - 100 mg/dL    Performed by [address], POC    Collection Time: 02/23/21 10:48 PM   Result Value Ref Range    Glucose (POC) 256 (H) 65 - 100 mg/dL    Performed by [address], POC    Collection Time: 02/23/21 11:45 PM   Result Value Ref Range    Glucose (POC) 197 (H) 65 - 100 mg/dL    Performed by [facility]    CBC WITH AUTOMATED DIFF    Collection Time: 02/24/21  3:00 AM   Result Value Ref Range    WBC 12.7 (H) 4.1 - 11.1 K/uL    RBC 2.73 (L) 4.10 - 5.70 M/uL    HGB 11.3 (L) 12.1 - 17.0 g/dL    HCT 34.6 (L) 36.6 - 50.3 %    .7 (H) 80.0 - 99.0 FL    MCH 41.4 (H) 26.0 - 34.0 PG    MCHC 32.7 30.0 - 36.5 g/dL    RDW 15.3 (H) 11.5 - 14.5 %    PLATELET 614 569 - 881 K/uL    MPV 12.2 8.9 - 12.9 FL    NRBC 0.0 0  WBC    ABSOLUTE NRBC 0.00 0.00 - 0.01 K/uL    NEUTROPHILS 94 (H) 32 - 75 %    LYMPHOCYTES 5 (L) 12 - 49 %    MONOCYTES 1 (L) 5 - 13 %    EOSINOPHILS 0 0 - 7 %    BASOPHILS 0 0 - 1 %    IMMATURE GRANULOCYTES 0 0.0 - 0.5 %    ABS. NEUTROPHILS 12.0 (H) 1.8 - 8.0 K/UL    ABS. LYMPHOCYTES 0.6 (L) 0.8 - 3.5 K/UL    ABS. MONOCYTES 0.2 0.0 - 1.0 K/UL    ABS. EOSINOPHILS 0.0 0.0 - 0.4 K/UL    ABS. BASOPHILS 0.0 0.0 - 0.1 K/UL    ABS. IMM.  GRANS. 0.0 0.00 - 0.04 K/UL    DF AUTOMATED     RENAL FUNCTION PANEL    Collection Time: 02/24/21  3:30 AM   Result Value Ref Range    Sodium 158 (H) 136 - 145 mmol/L    Potassium 3.5 3.5 - 5.1 mmol/L    Chloride 132 (H) 97 - 108 mmol/L    CO2 19 (L) 21 - 32 mmol/L    Anion gap 7 5 - 15 mmol/L    Glucose 82 65 - 100 mg/dL     (H) 6 - 20 mg/dL    Creatinine 4.38 (H) 0.70 - 1.30 mg/dL    BUN/Creatinine ratio 31 (H) 12 - 20      GFR est AA 16 (L) >60 ml/min/1.73m2    GFR est non-AA 13 (L) >60 ml/min/1.73m2    Calcium 8.2 (L) 8.5 - 10.1 mg/dL    Phosphorus 3.3 2.6 - 4.7 mg/dL    Albumin 1.9 (L) 3.5 - 5.0 g/dL   C REACTIVE PROTEIN, QT    Collection Time: 02/24/21  3:30 AM   Result Value Ref Range    C-Reactive protein 4.33 (H) 0.00 - 0.60 mg/dL   PROCALCITONIN    Collection Time: 02/24/21  3:30 AM   Result Value Ref Range    Procalcitonin 2.38 (H) 0 ng/mL   GLUCOSE, POC    Collection Time: 02/24/21  7:25 AM   Result Value Ref Range    Glucose (POC) 79 65 - 100 mg/dL    Performed by Pepe Gray    GLUCOSE, POC    Collection Time: 02/24/21  8:59 AM   Result Value Ref Range    Glucose (POC) 86 65 - 100 mg/dL    Performed by Pepe Gray    GLUCOSE, POC    Collection Time: 02/24/21  9:46 AM   Result Value Ref Range    Glucose (POC) 63 (L) 65 - 100 mg/dL    Performed by Pepe Gray    GLUCOSE, POC    Collection Time: 02/24/21 10:22 AM   Result Value Ref Range    Glucose (POC) 110 (H) 65 - 100 mg/dL    Performed by Pepe Gray          XR CHEST PORT   Final Result   Feeding tube tip at the GE junction. This should be advanced into   the stomach. Airspace opacities in the lungs, left greater than right, not   significantly changed. XR CHEST PORT   Final Result      US RETROPERITONEUM COMP   Final Result   Small right renal cyst. Mild left hydronephrosis of unclear   etiology. Gonzales catheter in the bladder. Please see full report. XR CHEST PORT   Final Result   New left lung opacities, nonspecific, but could represent an infectious process   in the appropriate clinical setting. Asymmetric edema could appear similar,   though is considered less likely. Patient Active Problem List   Diagnosis Code    Fall W19. XXXA    Weakness R53.1    Pneumonia J18.9    Renal failure N19    Sepsis ([facility] [address] A41.9        DIAGNOSES:  1. Acute kidney injury, grade 3 ( severe) with improvement in urine output  2. COVID-19 pneumonia  3. Severe hypernatremiasodium is still 158  4. Hyperchloremic metabolic acidosisbicarb better at 19 with anion gap of 7  5. Hypotension on pressors  6.  Encephalopathy      PLAN:  · BUN and creatinine shows mild improvement  · Acidosis mildly improved  · Hyponatremia remains but we are continuing IV fluids at similar rate  · Nutrition still concerning  · Potassium low and may need to replenish; check magnesium  · No indication for dialysis  · Thank you for consult

## 2021-02-24 NOTE — PROGRESS NOTES
Pulmonary Progress Note      NAME: Ramirez Barton   :  1934  MRM:  [id number]    Date/Time: 2021  11:56 AM         Subjective:     Patient seen and examined in the ICU. Discussed with the RN. He is on 3 L oxygen. He remains confused. Speech assessed him. Alternative means of nutrition is recommended. NG tube has been placed after multiple tries. Chest x-ray is pending. He has a Gonzales catheter and Flexi-Seal.    Past Medical History reviewed and unchanged from Admission History and Physical       Objective:     Physical Exam     Vitals:      Last 24hrs VS reviewed since prior progress note. Most recent are:    Visit Vitals  /81   Pulse (!) 102   Temp 98.7 °F (37.1 °C)   Resp 23   Ht 6' (1.829 m)   Wt 76 kg (167 lb 8.8 oz)   SpO2 99%   BMI 22.72 kg/m²     SpO2 Readings from Last 6 Encounters:   21 99%   21 96%    O2 Flow Rate (L/min): 2 l/min       Intake/Output Summary (Last 24 hours) at 2021 1212  Last data filed at 2021 0536  Gross per 24 hour   Intake 420.83 ml   Output 1525 ml   Net -1104.17 ml      GENERAL:  The patient is an elderly white male who is awake and answers simple questions. No distress. On 3 L oxygen. HEENT:  Shows pupils are equal and reactive to light. No pallor or icterus. Nasal passages are apparently patent. Oropharynx is difficult to evaluate. He will not cooperate with opening his mouth. NECK:  Supple. Trachea is central.  No JVD or lymphadenopathy. He is not using any accessory muscles of respirations. CHEST:  Symmetrical with equal and fair air entry bilaterally. However, he does not take a good inspiratory effort. Difficult to auscultate properly, a few scattered rhonchi are noted. HEART:  Rhythm is irregular with monitor showing sinus rhythm. No loud murmur or gallop. ABDOMEN:  Soft and appears to be benign. Bowel sounds are audible. He has a Gonzales catheter and a Flexi-Seal in place.   EXTREMITIES:  Do not show any cyanosis or clubbing. Trace pitting edema. Pulses are palpable. NEUROLOGIC:  Shows that the patient is moving his extremities but appears to be confused. He has mittens in place. SKIN:  Warm and dry. XR CHEST PORT   Final Result      US RETROPERITONEUM COMP   Final Result   Small right renal cyst. Mild left hydronephrosis of unclear   etiology. Gonzales catheter in the bladder. Please see full report. XR CHEST PORT   Final Result   New left lung opacities, nonspecific, but could represent an infectious process   in the appropriate clinical setting. Asymmetric edema could appear similar,   though is considered less likely.          XR CHEST PORT    (Results Pending)       Lab Data Reviewed: (see below)      Medications:  Current Facility-Administered Medications   Medication Dose Route Frequency    dilTIAZem (CARDIZEM) 125 mg/125 mL (1 mg/mL) dextrose 5% infusion  5 mg/hr IntraVENous TITRATE    insulin lispro (HUMALOG) injection   SubCUTAneous Q6H    digoxin (LANOXIN) tablet 0.125 mg  0.125 mg Oral ONCE    potassium chloride (KLOR-CON) packet for solution 20 mEq  20 mEq Oral DAILY    dextrose 5% infusion  150 mL/hr IntraVENous CONTINUOUS    dextrose (D50W) injection syrg 12.5 g  25 mL IntraVENous PRN    NOREPINephrine (LEVOPHED) 8 mg in 5% dextrose 250mL (32 mcg/mL) infusion  0.5-16 mcg/min IntraVENous TITRATE    famotidine (PF) (PEPCID) 20 mg in 0.9% sodium chloride 10 mL injection  20 mg IntraVENous DAILY    Vancomycin dose per pharmacy protocol   Other Rx Dosing/Monitoring    midodrine (PROAMATINE) tablet 5 mg  5 mg Oral TID PRN    sodium chloride (NS) flush 5-40 mL  5-40 mL IntraVENous Q8H    sodium chloride (NS) flush 5-40 mL  5-40 mL IntraVENous PRN    azithromycin (ZITHROMAX) 500 mg in 0.9% sodium chloride 250 mL (VIAL-MATE)  500 mg IntraVENous DAILY    heparin (porcine) injection 5,000 Units  5,000 Units SubCUTAneous Q12H    dexamethasone (DECADRON) 4 mg/mL injection 4 mg  4 mg IntraVENous Q8H    glucose chewable tablet 16 g  4 Tab Oral PRN    dextrose (D50W) injection syrg 12.5-25 g  25-50 mL IntraVENous PRN    glucagon (GLUCAGEN) injection 1 mg  1 mg IntraMUSCular PRN    piperacillin-tazobactam (ZOSYN) 3.375 g in 0.9% sodium chloride (MBP/ADV) 100 mL MBP  3.375 g IntraVENous Q12H       ______________________________________________________________________      Lab Review:     Recent Labs     02/24/21  0300   WBC 12.7*   HGB 11.3*   HCT 34.6*        Recent Labs     02/24/21  0330 02/23/21  0925   * 157*   K 3.5 4.2   * 128*   CO2 19* 16*   GLU 82 230*   * 141*   CREA 4.38* 5.04*   CA 8.2* 8.3*   PHOS 3.3 6.1*   ALB 1.9* 2.0*     No components found for: GLPOC  No results for input(s): PH, PCO2, PO2, HCO3, FIO2 in the last 72 hours. No results for input(s): INR, INREXT, INREXT, INREXT in the last 72 hours. Other pertinent lab:          Assessment & Plan:        1. Severe sepsis:  He was weaned off Levophed 2/22. Etiology appears to be multifactorial.  Probably urinary tract infection. However, aspiration pneumonia needs to be considered as well. Additionally, Line infection cannot be ruled out. He has right upper extremity PICC (peripherally inserted central catheter) line in place. Infectious Disease is also on the case. Cultures are pending. Urine cultures are showing gram-negative rods. He is empirically started on Zosyn and azithromycin which will be continued. Further adjustment can be made after blood cultures have resulted or urine cultures have resulted. 2.  Altered mental status: This appears to be secondary to underlying sepsis. He is at risk for aspiration. Speech has evaluated the patient. Recommending alternative means of nutrition. NG tube has been passed after multiple attempts by RN. Will check x-ray for placement.   However not unreasonable to consider PEG tube in this elderly man with dementia and aspiration pneumonia.    3.  Hypoxic respiratory failure:  He has mainly left-sided infiltrate.  On 3 liters of oxygen.  Wean down as appropriate.  He is on droplet precautions because of history of COVID-19 infection.  We will avoid nebulizations at this time.  Chest x-ray done 2/23 shows no significant change.  4.  Acute renal failure:  Creatinine is 5.04.  Continue with IV fluids.  Nephrology has been consulted as well.  5.  History of atrial fibrillation:   Cardiology is also consulted as well.  6.  Multiple other medical problems including diabetes mellitus, hypertension, hypothyroidism, polycythemia vera, spinal stenosis and a history of transient ischemic attack.  7.  For deep venous thrombosis prophylaxis, he is started on heparin subcutaneously.  8.  For gastrointestinal stress prophylaxis, on IV Pepcid.     Thank you for allowing me to participate in the care of this patient.  I will follow the patient closely with you.  The patient is apparently a full code.  He is also on dexamethasone which can be continued at this time.  However I will decrease the dose. Prognosis is guarded. Discussed with the nursing team in the ICU.  More than 30 minutes spent with patient care.     LORETO MORALES MD

## 2021-02-25 LAB
ALBUMIN SERPL-MCNC: 1.8 G/DL (ref 3.5–5)
ANION GAP SERPL CALC-SCNC: 7 MMOL/L (ref 5–15)
BASOPHILS # BLD: 0 K/UL (ref 0–0.1)
BASOPHILS NFR BLD: 0 % (ref 0–1)
BUN SERPL-MCNC: 120 MG/DL (ref 6–20)
BUN/CREAT SERPL: 31 (ref 12–20)
CA-I BLD-MCNC: 7.8 MG/DL (ref 8.5–10.1)
CHLORIDE SERPL-SCNC: 123 MMOL/L (ref 97–108)
CO2 SERPL-SCNC: 20 MMOL/L (ref 21–32)
CREAT SERPL-MCNC: 3.88 MG/DL (ref 0.7–1.3)
DIFFERENTIAL METHOD BLD: ABNORMAL
EOSINOPHIL # BLD: 0 K/UL (ref 0–0.4)
EOSINOPHIL NFR BLD: 0 % (ref 0–7)
ERYTHROCYTE [DISTWIDTH] IN BLOOD BY AUTOMATED COUNT: 15.6 % (ref 11.5–14.5)
GLUCOSE BLD STRIP.AUTO-MCNC: 175 MG/DL (ref 65–100)
GLUCOSE BLD STRIP.AUTO-MCNC: 290 MG/DL (ref 65–100)
GLUCOSE BLD STRIP.AUTO-MCNC: 327 MG/DL (ref 65–100)
GLUCOSE BLD STRIP.AUTO-MCNC: 348 MG/DL (ref 65–100)
GLUCOSE BLD STRIP.AUTO-MCNC: 364 MG/DL (ref 65–100)
GLUCOSE SERPL-MCNC: 367 MG/DL (ref 65–100)
HCT VFR BLD AUTO: 33.4 % (ref 36.6–50.3)
HGB BLD-MCNC: 11.1 G/DL (ref 12.1–17)
IMM GRANULOCYTES # BLD AUTO: 0 K/UL (ref 0–0.04)
IMM GRANULOCYTES NFR BLD AUTO: 0 % (ref 0–0.5)
LYMPHOCYTES # BLD: 0.5 K/UL (ref 0.8–3.5)
LYMPHOCYTES NFR BLD: 5 % (ref 12–49)
MCH RBC QN AUTO: 42.2 PG (ref 26–34)
MCHC RBC AUTO-ENTMCNC: 33.2 G/DL (ref 30–36.5)
MCV RBC AUTO: 127 FL (ref 80–99)
MONOCYTES # BLD: 0.1 K/UL (ref 0–1)
MONOCYTES NFR BLD: 2 % (ref 5–13)
NEUTS SEG # BLD: 8.7 K/UL (ref 1.8–8)
NEUTS SEG NFR BLD: 93 % (ref 32–75)
PERFORMED BY, TECHID: ABNORMAL
PHOSPHATE SERPL-MCNC: 3.2 MG/DL (ref 2.6–4.7)
PLATELET # BLD AUTO: 126 K/UL (ref 150–400)
PMV BLD AUTO: 12.6 FL (ref 8.9–12.9)
POTASSIUM SERPL-SCNC: 3.5 MMOL/L (ref 3.5–5.1)
RBC # BLD AUTO: 2.63 M/UL (ref 4.1–5.7)
SARS-COV-2, COV2: NORMAL
SODIUM SERPL-SCNC: 150 MMOL/L (ref 136–145)
VANCOMYCIN SERPL-MCNC: 15.2 UG/ML
WBC # BLD AUTO: 9.3 K/UL (ref 4.1–11.1)

## 2021-02-25 PROCEDURE — 74011000258 HC RX REV CODE- 258: Performed by: INTERNAL MEDICINE

## 2021-02-25 PROCEDURE — 74011250636 HC RX REV CODE- 250/636: Performed by: INTERNAL MEDICINE

## 2021-02-25 PROCEDURE — 36415 COLL VENOUS BLD VENIPUNCTURE: CPT

## 2021-02-25 PROCEDURE — 99232 SBSQ HOSP IP/OBS MODERATE 35: CPT | Performed by: INTERNAL MEDICINE

## 2021-02-25 PROCEDURE — 80069 RENAL FUNCTION PANEL: CPT

## 2021-02-25 PROCEDURE — 74011636637 HC RX REV CODE- 636/637: Performed by: INTERNAL MEDICINE

## 2021-02-25 PROCEDURE — 80202 ASSAY OF VANCOMYCIN: CPT

## 2021-02-25 PROCEDURE — 82962 GLUCOSE BLOOD TEST: CPT

## 2021-02-25 PROCEDURE — 74011000250 HC RX REV CODE- 250: Performed by: INTERNAL MEDICINE

## 2021-02-25 PROCEDURE — 65610000006 HC RM INTENSIVE CARE

## 2021-02-25 PROCEDURE — 85025 COMPLETE CBC W/AUTO DIFF WBC: CPT

## 2021-02-25 RX ORDER — DEXTROSE MONOHYDRATE 100 MG/ML
40 INJECTION, SOLUTION INTRAVENOUS CONTINUOUS
Status: DISPENSED | OUTPATIENT
Start: 2021-02-25 | End: 2021-02-26

## 2021-02-25 RX ADMIN — VANCOMYCIN HYDROCHLORIDE 750 MG: 750 INJECTION, POWDER, LYOPHILIZED, FOR SOLUTION INTRAVENOUS at 12:05

## 2021-02-25 RX ADMIN — FAMOTIDINE 20 MG: 10 INJECTION INTRAVENOUS at 08:19

## 2021-02-25 RX ADMIN — Medication 10 ML: at 22:00

## 2021-02-25 RX ADMIN — DEXAMETHASONE SODIUM PHOSPHATE 4 MG: 4 INJECTION, SOLUTION INTRA-ARTICULAR; INTRALESIONAL; INTRAMUSCULAR; INTRAVENOUS; SOFT TISSUE at 08:19

## 2021-02-25 RX ADMIN — Medication 10 ML: at 15:08

## 2021-02-25 RX ADMIN — DEXTROSE MONOHYDRATE 150 ML/HR: 50 INJECTION, SOLUTION INTRAVENOUS at 17:37

## 2021-02-25 RX ADMIN — INSULIN LISPRO 8 UNITS: 100 INJECTION, SOLUTION INTRAVENOUS; SUBCUTANEOUS at 01:38

## 2021-02-25 RX ADMIN — Medication 5 MG/HR: at 12:48

## 2021-02-25 RX ADMIN — DEXTROSE MONOHYDRATE 150 ML/HR: 50 INJECTION, SOLUTION INTRAVENOUS at 01:33

## 2021-02-25 RX ADMIN — PIPERACILLIN AND TAZOBACTAM 3.38 G: 3; .375 INJECTION, POWDER, LYOPHILIZED, FOR SOLUTION INTRAVENOUS at 20:51

## 2021-02-25 RX ADMIN — PIPERACILLIN AND TAZOBACTAM 3.38 G: 3; .375 INJECTION, POWDER, LYOPHILIZED, FOR SOLUTION INTRAVENOUS at 08:18

## 2021-02-25 RX ADMIN — AZITHROMYCIN DIHYDRATE 500 MG: 500 INJECTION, POWDER, LYOPHILIZED, FOR SOLUTION INTRAVENOUS at 09:10

## 2021-02-25 RX ADMIN — DEXTROSE MONOHYDRATE 40 ML/HR: 100 INJECTION, SOLUTION INTRAVENOUS at 17:47

## 2021-02-25 RX ADMIN — HEPARIN SODIUM 5000 UNITS: 5000 INJECTION INTRAVENOUS; SUBCUTANEOUS at 03:00

## 2021-02-25 RX ADMIN — DEXTROSE MONOHYDRATE 150 ML/HR: 50 INJECTION, SOLUTION INTRAVENOUS at 08:18

## 2021-02-25 RX ADMIN — HEPARIN SODIUM 5000 UNITS: 5000 INJECTION INTRAVENOUS; SUBCUTANEOUS at 15:06

## 2021-02-25 RX ADMIN — INSULIN LISPRO 10 UNITS: 100 INJECTION, SOLUTION INTRAVENOUS; SUBCUTANEOUS at 08:18

## 2021-02-25 RX ADMIN — INSULIN LISPRO 2 UNITS: 100 INJECTION, SOLUTION INTRAVENOUS; SUBCUTANEOUS at 17:37

## 2021-02-25 RX ADMIN — Medication 10 ML: at 06:00

## 2021-02-25 RX ADMIN — INSULIN LISPRO 8 UNITS: 100 INJECTION, SOLUTION INTRAVENOUS; SUBCUTANEOUS at 12:04

## 2021-02-25 RX ADMIN — DEXAMETHASONE SODIUM PHOSPHATE 4 MG: 4 INJECTION, SOLUTION INTRA-ARTICULAR; INTRALESIONAL; INTRAMUSCULAR; INTRAVENOUS; SOFT TISSUE at 20:51

## 2021-02-25 NOTE — PROGRESS NOTES
Progress Note    Patient: Khanh Gray MRN: [id number]  SSN: [id number]    YOB: 1934  Age: 80 y.o. Sex: male      Admit Date: 2/21/2021    LOS: 4 days     Subjective:     Patient was transferred from the floor to ICU for septic shock started on IVF pressors subsequently weaned off    Objective:     Vitals:    02/25/21 1200 02/25/21 1300 02/25/21 1500 02/25/21 1700   BP: (!) 131/95 122/69 124/76 125/72   Pulse: 85 92 79 82   Resp: 29 (!) 31 30 (!) 33   Temp:   (!) 96.7 °F (35.9 °C)    SpO2: 97% 96% 95% 94%   Weight:       Height:            Intake and Output:  Current Shift: 02/25 0701 - 02/25 1900  In: -   Out: 1500 [Urine:1500]  Last three shifts: 02/23 1901 - 02/25 0700  In: 420.8 [I.V.:420.8]  Out: 1575 [Urine:1575]    Physical Exam:   Lethargic    Eyes:   Lethargic   Ears:  Normal TMs and external ear canals both ears. Nose: Nares normal. Septum midline. Mucosa normal. No drainage or sinus tenderness. Mouth/Throat: Lips, mucosa, and tongue normal. Teeth and gums normal.   Neck: Supple, symmetrical, trachea midline, no adenopathy, thyroid: no enlargment/tenderness/nodules, no carotid bruit and no JVD. Back:   Symmetric, no curvature. ROM normal. No CVA tenderness. Lungs:   Clear to auscultation bilaterally. Heart:  Regular rate and rhythm, S1, S2 normal, no murmur, click, rub or gallop. Abdomen:   Soft, non-tender. Bowel sounds normal. No masses,  No organomegaly. Extremities: lethargic atraumatic, no cyanosis or edema. Pulses: 2+ and symmetric all extremities. Skin: Skin color, texture, turgor normal. No rashes or lesions   Lymph nodes: Cervical, supraclavicular, and axillary nodes normal.   Neurologic: lethargic       Lab/Data Review: All lab results for the last 24 hours reviewed.      Recent Results (from the past 24 hour(s))   GLUCOSE, POC    Collection Time: 02/25/21 12:00 AM   Result Value Ref Range    Glucose (POC) 327 (H) 65 - 100 mg/dL    Performed by [facility] Dom Duran, LUCILLE    Collection Time: 02/25/21  4:30 AM   Result Value Ref Range    Vancomycin, random 15.2 ug/mL   RENAL FUNCTION PANEL    Collection Time: 02/25/21  4:30 AM   Result Value Ref Range    Sodium 150 (H) 136 - 145 mmol/L    Potassium 3.5 3.5 - 5.1 mmol/L    Chloride 123 (H) 97 - 108 mmol/L    CO2 20 (L) 21 - 32 mmol/L    Anion gap 7 5 - 15 mmol/L    Glucose 367 (H) 65 - 100 mg/dL     (H) 6 - 20 mg/dL    Creatinine 3.88 (H) 0.70 - 1.30 mg/dL    BUN/Creatinine ratio 31 (H) 12 - 20      GFR est AA 18 (L) >60 ml/min/1.73m2    GFR est non-AA 15 (L) >60 ml/min/1.73m2    Calcium 7.8 (L) 8.5 - 10.1 mg/dL    Phosphorus 3.2 2.6 - 4.7 mg/dL    Albumin 1.8 (L) 3.5 - 5.0 g/dL   CBC WITH AUTOMATED DIFF    Collection Time: 02/25/21  4:30 AM   Result Value Ref Range    WBC 9.3 4.1 - 11.1 K/uL    RBC 2.63 (L) 4.10 - 5.70 M/uL    HGB 11.1 (L) 12.1 - 17.0 g/dL    HCT 33.4 (L) 36.6 - 50.3 %    .0 (H) 80.0 - 99.0 FL    MCH 42.2 (H) 26.0 - 34.0 PG    MCHC 33.2 30.0 - 36.5 g/dL    RDW 15.6 (H) 11.5 - 14.5 %    PLATELET 181 (L) 646 - 400 K/uL    MPV 12.6 8.9 - 12.9 FL    NEUTROPHILS 93 (H) 32 - 75 %    LYMPHOCYTES 5 (L) 12 - 49 %    MONOCYTES 2 (L) 5 - 13 %    EOSINOPHILS 0 0 - 7 %    BASOPHILS 0 0 - 1 %    IMMATURE GRANULOCYTES 0 0.0 - 0.5 %    ABS. NEUTROPHILS 8.7 (H) 1.8 - 8.0 K/UL    ABS. LYMPHOCYTES 0.5 (L) 0.8 - 3.5 K/UL    ABS. MONOCYTES 0.1 0.0 - 1.0 K/UL    ABS. EOSINOPHILS 0.0 0.0 - 0.4 K/UL    ABS. BASOPHILS 0.0 0.0 - 0.1 K/UL    ABS. IMM.  GRANS. 0.0 0.00 - 0.04 K/UL    DF AUTOMATED     GLUCOSE, POC    Collection Time: 02/25/21  5:41 AM   Result Value Ref Range    Glucose (POC) 364 (H) 65 - 100 mg/dL    Performed by Abbey Carpenter, POC    Collection Time: 02/25/21 12:04 PM   Result Value Ref Range    Glucose (POC) 348 (H) 65 - 100 mg/dL    Performed by Surendra Guzman, POC    Collection Time: 02/25/21  5:36 PM   Result Value Ref Range    Glucose (POC) 175 (H) 65 - 100 mg/dL    Performed by Maria E Gonzalez          Assessment:     Active Problems:    Pneumonia (2/21/2021)      Renal failure (2/21/2021)      Sepsis ([address] (2/21/2021)    Aspiration pneumonia  Septic shock  Acute kidney injury  uti  DKA improved  Plan:     Gi for peg tube placement      Continue present treatment    Signed By: Avery Owens MD     February 25, 2021

## 2021-02-25 NOTE — PROGRESS NOTES
Pulmonary Progress Note      NAME: Verla Eisenmenger   :  1934  MRM:  [id number]    Date/Time: 2021  11:56 AM         Subjective:     Patient seen and examined in the ICU. Discussed with the RN. He is now on room air. He remains confused. Speech evaluated him. Alternative means of nutrition was recommended. Unable to pass NG tube. GI is consulted for possible PEG tube. He is to be started on PPN. Nephrology on board as well. Started on diltiazem infusion. He has a Gonzales catheter and Flexi-Seal.    Past Medical History reviewed and unchanged from Admission History and Physical       Objective:     Physical Exam     Vitals:      Last 24hrs VS reviewed since prior progress note. Most recent are:    Visit Vitals  /76 (BP 1 Location: Left upper arm, BP Patient Position: At rest)   Pulse 89   Temp (!) 96.6 °F (35.9 °C)   Resp 25   Ht 6' (1.829 m)   Wt 74.5 kg (164 lb 3.9 oz)   SpO2 95%   BMI 22.28 kg/m²     SpO2 Readings from Last 6 Encounters:   21 95%   21 96%    O2 Flow Rate (L/min): 1 l/min(placed patient on room air)       Intake/Output Summary (Last 24 hours) at 2021 1056  Last data filed at 2021 1700  Gross per 24 hour   Intake    Output 450 ml   Net -450 ml      GENERAL:  The patient is an elderly white male who is somnolent. On room air. No distress. HEENT:  Shows pupils are equal and reactive to light. No pallor or icterus. Nasal passages are apparently patent. Oropharynx is difficult to evaluate. He will not cooperate with opening his mouth. NECK:  Supple. Trachea is central.  No JVD or lymphadenopathy. He is not using any accessory muscles of respirations. CHEST:  Symmetrical with equal and fair air entry bilaterally. However, he does not take a good inspiratory effort. Difficult to auscultate properly, scattered rhonchi are noted. HEART:  Rhythm is irregular with monitor showing sinus rhythm. No loud murmur or gallop.   ABDOMEN:  Soft and appears to be benign. Bowel sounds are audible. He has a Gonzales catheter and a Flexi-Seal in place. EXTREMITIES:  Do not show any cyanosis or clubbing. Trace pitting edema. Pulses are palpable. NEUROLOGIC:  Shows that the patient is moving his extremities but appears to be confused. He has mittens in place. SKIN:  Warm and dry. XR CHEST PORT   Final Result   Gastric tube unchanged from the prior exam and should be advanced 8   cm for optimal positioning. Persistent interstitial infiltrates, unchanged. Please see full report. I called the CCU with this result and recommendation at   4:31 PM. I spoke with Cuco Gifford. She states the NG tube appears to be at its   distal aspect outside the patient. I suspect it is coiled within the throat as   the NG tube position appears unchanged from the earlier exam.      XR CHEST PORT   Final Result   Feeding tube tip at the GE junction. This should be advanced into   the stomach. Airspace opacities in the lungs, left greater than right, not   significantly changed. XR CHEST PORT   Final Result      US RETROPERITONEUM COMP   Final Result   Small right renal cyst. Mild left hydronephrosis of unclear   etiology. Gonzales catheter in the bladder. Please see full report. XR CHEST PORT   Final Result   New left lung opacities, nonspecific, but could represent an infectious process   in the appropriate clinical setting. Asymmetric edema could appear similar,   though is considered less likely.              Lab Data Reviewed: (see below)      Medications:  Current Facility-Administered Medications   Medication Dose Route Frequency    vancomycin (VANCOCIN) 750 mg in 0.9% sodium chloride 250 mL (VIAL-MATE)  750 mg IntraVENous ONCE    [START ON 2/26/2021] VANCOMYCIN RANDOM LAB REMINDER   Other ONCE    insulin lispro (HUMALOG) injection   SubCUTAneous Q6H    dexamethasone (DECADRON) 4 mg/mL injection 4 mg  4 mg IntraVENous Q12H    dilTIAZem (CARDIZEM) 125 mg/125 mL (1 mg/mL) dextrose 5% infusion  0-15 mg/hr IntraVENous TITRATE    dextrose 5% infusion  150 mL/hr IntraVENous CONTINUOUS    dextrose (D50W) injection syrg 12.5 g  25 mL IntraVENous PRN    NOREPINephrine (LEVOPHED) 8 mg in 5% dextrose 250mL (32 mcg/mL) infusion  0.5-16 mcg/min IntraVENous TITRATE    famotidine (PF) (PEPCID) 20 mg in 0.9% sodium chloride 10 mL injection  20 mg IntraVENous DAILY    Vancomycin dose per pharmacy protocol   Other Rx Dosing/Monitoring    midodrine (PROAMATINE) tablet 5 mg  5 mg Oral TID PRN    sodium chloride (NS) flush 5-40 mL  5-40 mL IntraVENous Q8H    sodium chloride (NS) flush 5-40 mL  5-40 mL IntraVENous PRN    azithromycin (ZITHROMAX) 500 mg in 0.9% sodium chloride 250 mL (VIAL-MATE)  500 mg IntraVENous DAILY    heparin (porcine) injection 5,000 Units  5,000 Units SubCUTAneous Q12H    glucose chewable tablet 16 g  4 Tab Oral PRN    dextrose (D50W) injection syrg 12.5-25 g  25-50 mL IntraVENous PRN    glucagon (GLUCAGEN) injection 1 mg  1 mg IntraMUSCular PRN    piperacillin-tazobactam (ZOSYN) 3.375 g in 0.9% sodium chloride (MBP/ADV) 100 mL MBP  3.375 g IntraVENous Q12H       ______________________________________________________________________      Lab Review:     Recent Labs     02/25/21  0430 02/24/21  0300   WBC 9.3 12.7*   HGB 11.1* 11.3*   HCT 33.4* 34.6*   * 151     Recent Labs     02/25/21  0430 02/24/21  0330 02/23/21  0925   * 158* 157*   K 3.5 3.5 4.2   * 132* 128*   CO2 20* 19* 16*   * 82 230*   * 136* 141*   CREA 3.88* 4.38* 5.04*   CA 7.8* 8.2* 8.3*   PHOS 3.2 3.3 6.1*   ALB 1.8* 1.9* 2.0*     No components found for: GLPOC  No results for input(s): PH, PCO2, PO2, HCO3, FIO2 in the last 72 hours. No results for input(s): INR, INREXT, INREXT, INREXT in the last 72 hours. Other pertinent lab:          Assessment & Plan:        1. Severe sepsis:  He was weaned off Levophed 2/22.   Etiology appears to be multifactorial. Probably urinary tract infection. However, he does appear to have aspiration pneumonia. Additionally, Line infection cannot be ruled out. He has right upper extremity PICC (peripherally inserted central catheter) line in place. Infectious Disease is also on the case. Cultures are pending. Urine cultures are showing gram-negative rods. He is empirically started on Zosyn and azithromycin which will be continued. Further adjustment can be made after blood cultures have resulted or urine cultures have resulted. 2.  Altered mental status: This appears to be secondary to underlying sepsis. He is at risk for aspiration. Speech has evaluated the patient. Recommending alternative means of nutrition. NG tube could not be passed. GI is consulted for possible PEG tube placement. He is to be started on PPN. It is not unreasonable to consider PEG tube in this elderly man with dementia and aspiration pneumonia. 3.  Hypoxic respiratory failure:  He has mainly left-sided infiltrate. He is now on room air. He is on droplet precautions because of history of COVID-19 infection. We will avoid nebulizations at this time. Chest x-ray done 2/23 shows no significant change. We will repeat chest x-ray in the morning. 4.  Acute renal failure:  Creatinine is 4.38, was 5.04. Nephrology has been consulted as well. 5.  History of atrial fibrillation:   Cardiology is also consulted as well. 6.  Multiple other medical problems including diabetes mellitus, hypertension, hypothyroidism, polycythemia vera, spinal stenosis and a history of transient ischemic attack. 7.  For deep venous thrombosis prophylaxis, he is started on heparin subcutaneously. 8.  For gastrointestinal stress prophylaxis, on IV Pepcid.     Thank you for allowing me to participate in the care of this patient. I will follow the patient closely with you. The patient is apparently a full code.   He is also on dexamethasone which can be continued at this time. However decreasing the dose. Prognosis is guarded. Discussed with the nursing team in the ICU.   More than 30 minutes spent with patient care.  Camron Chapa MD

## 2021-02-25 NOTE — PROGRESS NOTES
Progress Note    Patient: Rbeecca Baker MRN: [id number]  SSN: [id number]    YOB: 1934  Age: 80 y.o. Sex: male      Admit Date: 2/21/2021    LOS: 4 days     Subjective:     Patient seen in ICU appears comfortable on room air. Remains somnolent and confused. -GI is following for a possible PEG tube placement.  -Patient is admitted to the hospital from [address] [place] [address]. Patient found to be hypotensive and altered, with systolic BP in the 94T. He is also positive for COVID-19 infection. He has severe sepsis. Objective:     Vitals:    02/25/21 1100 02/25/21 1200 02/25/21 1300 02/25/21 1500   BP: 112/68 (!) 131/95 122/69 124/76   Pulse: 85 85 92 79   Resp: 28 29 (!) 31 30   Temp: 97.1 °F (36.2 °C)   (!) 96.7 °F (35.9 °C)   SpO2: 96% 97% 96% 95%   Weight:       Height:            Intake and Output:  Current Shift: 02/25 0701 - 02/25 1900  In: -   Out: 625 [Urine:625]  Last three shifts: 02/23 1901 - 02/25 0700  In: 420.8 [I.V.:420.8]  Out: 2918 [Urine:1575]    Physical Exam:   Skin:  Extremities and face reveal no rashes. No vanegas erythema. HEENT: Sclerae anicteric. Extra-occular muscles are intact. No abnormal pigmentation of the lips. The neck is supple. Cardiovascular: Regular rate and rhythm. Respiratory:  Comfortable breathing with no accessory muscle use. GI:  Abdomen nondistended, soft, and nontender. No enlargement of the liver or spleen. No masses palpable. Rectal:  Deferred  Musculoskeletal: Extremities have good range of motion. Neurological:  Somnolent  Psychiatric:  Confused.   Lymphatic:  No visible adenopathy      Lab/Data Review:  Recent Results (from the past 24 hour(s))   GLUCOSE, POC    Collection Time: 02/25/21 12:00 AM   Result Value Ref Range    Glucose (POC) 327 (H) 65 - 100 mg/dL    Performed by LUCILLE Greenwood    Collection Time: 02/25/21  4:30 AM   Result Value Ref Range    Vancomycin, random 15.2 ug/mL   RENAL FUNCTION PANEL    Collection Time: 02/25/21  4:30 AM   Result Value Ref Range    Sodium 150 (H) 136 - 145 mmol/L    Potassium 3.5 3.5 - 5.1 mmol/L    Chloride 123 (H) 97 - 108 mmol/L    CO2 20 (L) 21 - 32 mmol/L    Anion gap 7 5 - 15 mmol/L    Glucose 367 (H) 65 - 100 mg/dL     (H) 6 - 20 mg/dL    Creatinine 3.88 (H) 0.70 - 1.30 mg/dL    BUN/Creatinine ratio 31 (H) 12 - 20      GFR est AA 18 (L) >60 ml/min/1.73m2    GFR est non-AA 15 (L) >60 ml/min/1.73m2    Calcium 7.8 (L) 8.5 - 10.1 mg/dL    Phosphorus 3.2 2.6 - 4.7 mg/dL    Albumin 1.8 (L) 3.5 - 5.0 g/dL   CBC WITH AUTOMATED DIFF    Collection Time: 02/25/21  4:30 AM   Result Value Ref Range    WBC 9.3 4.1 - 11.1 K/uL    RBC 2.63 (L) 4.10 - 5.70 M/uL    HGB 11.1 (L) 12.1 - 17.0 g/dL    HCT 33.4 (L) 36.6 - 50.3 %    .0 (H) 80.0 - 99.0 FL    MCH 42.2 (H) 26.0 - 34.0 PG    MCHC 33.2 30.0 - 36.5 g/dL    RDW 15.6 (H) 11.5 - 14.5 %    PLATELET 030 (L) 823 - 400 K/uL    MPV 12.6 8.9 - 12.9 FL    NEUTROPHILS 93 (H) 32 - 75 %    LYMPHOCYTES 5 (L) 12 - 49 %    MONOCYTES 2 (L) 5 - 13 %    EOSINOPHILS 0 0 - 7 %    BASOPHILS 0 0 - 1 %    IMMATURE GRANULOCYTES 0 0.0 - 0.5 %    ABS. NEUTROPHILS 8.7 (H) 1.8 - 8.0 K/UL    ABS. LYMPHOCYTES 0.5 (L) 0.8 - 3.5 K/UL    ABS. MONOCYTES 0.1 0.0 - 1.0 K/UL    ABS. EOSINOPHILS 0.0 0.0 - 0.4 K/UL    ABS. BASOPHILS 0.0 0.0 - 0.1 K/UL    ABS. IMM. GRANS. 0.0 0.00 - 0.04 K/UL    DF AUTOMATED     GLUCOSE, POC    Collection Time: 02/25/21  5:41 AM   Result Value Ref Range    Glucose (POC) 364 (H) 65 - 100 mg/dL    Performed by Ishan Quiroga, POC    Collection Time: 02/25/21 12:04 PM   Result Value Ref Range    Glucose (POC) 348 (H) 65 - 100 mg/dL    Performed by Fina Phillip               XR CHEST PORT   Final Result   Gastric tube unchanged from the prior exam and should be advanced 8   cm for optimal positioning. Persistent interstitial infiltrates, unchanged. Please see full report.  I called the CCU with this result and recommendation at   4:31 PM. I spoke with Asia Urias. She states the NG tube appears to be at its   distal aspect outside the patient. I suspect it is coiled within the throat as   the NG tube position appears unchanged from the earlier exam.      XR CHEST PORT   Final Result   Feeding tube tip at the GE junction. This should be advanced into   the stomach. Airspace opacities in the lungs, left greater than right, not   significantly changed. XR CHEST PORT   Final Result      US RETROPERITONEUM COMP   Final Result   Small right renal cyst. Mild left hydronephrosis of unclear   etiology. Gonzales catheter in the bladder. Please see full report. XR CHEST PORT   Final Result   New left lung opacities, nonspecific, but could represent an infectious process   in the appropriate clinical setting. Asymmetric edema could appear similar,   though is considered less likely. XR CHEST PORT    (Results Pending)       Assessment:     Active Problems:    Pneumonia (2/21/2021)      Renal failure (2/21/2021)      Sepsis ([address] (2/21/2021)        Plan:     Hold off on PEG placement for now. Start PPN. Patient discussed with Dr Bear Guerra and agrees to above impression and plan. Thank you for allowing me to participate in this patients care    Signed By: Ronit Arriaga.  SUZI Pillai     February 25, 2021

## 2021-02-25 NOTE — PROGRESS NOTES
Patient is seen and examined  In Covid 23 isolation  Discussed with ICU RN    Past Medical History:   Diagnosis Date    Atrial fibrillation [facility]     Diabetes mellitus type 2, uncomplicated ([facility] [facility]     Hyperlipidemia     Hypertension     Hypothyroidism     Polycythemia vera ([facility] [facility]     Prostatic hyperplasia     Spinal stenosis     Transient ischemic attack       Past Surgical History:   Procedure Laterality Date    HX OTHER SURGICAL      None     Family History   Problem Relation Age of Onset    Hypertension Mother     Stroke Mother     Lung Cancer Father     Melanoma Father       Social History     Tobacco Use    Smoking status: Unknown If Ever Smoked   Substance Use Topics    Alcohol use: Never     Frequency: Never     Binge frequency: Never       Current Facility-Administered Medications   Medication Dose Route Frequency Provider Last Rate Last Admin    vancomycin (VANCOCIN) 750 mg in 0.9% sodium chloride 250 mL (VIAL-MATE)  750 mg IntraVENous ONCE Doreen LENZ MD   750 mg at 02/25/21 1205    [START ON 2/26/2021] VANCOMYCIN RANDOM LAB REMINDER   Other ONCE Doreen LENZ MD        insulin lispro (HUMALOG) injection   SubCUTAneous Q6H Doreen LENZ MD   8 Units at 02/25/21 1204    dexamethasone (DECADRON) 4 mg/mL injection 4 mg  4 mg IntraVENous Q12H Joselito Haile MD   4 mg at 02/25/21 0819    dilTIAZem (CARDIZEM) 125 mg/125 mL (1 mg/mL) dextrose 5% infusion  0-15 mg/hr IntraVENous TITRATE Matthew He MD 5 mL/hr at 02/25/21 1248 5 mg/hr at 02/25/21 1248    dextrose 5% infusion  150 mL/hr IntraVENous CONTINUOUS Doreen LENZ  mL/hr at 02/25/21 0818 150 mL/hr at 02/25/21 0818    dextrose (D50W) injection syrg 12.5 g  25 mL IntraVENous PRN Doreen LENZ MD   12.5 g at 02/24/21 0957    NOREPINephrine (LEVOPHED) 8 mg in 5% dextrose 250mL (32 mcg/mL) infusion  0.5-16 mcg/min IntraVENous TITRATE Jyothi Kelly MD   Stopped at 02/22/21 0800    famotidine (PF) (PEPCID) 20 mg in 0.9% sodium chloride 10 mL injection  20 mg IntraVENous DAILY Joselito Blanco MD   20 mg at 02/25/21 0819    Vancomycin dose per pharmacy protocol   Other Rx Dosing/Monitoring Caridad Bazan MD        midodrine (PROAMATINE) tablet 5 mg  5 mg Oral TID PRN Caridad Bazan MD        sodium chloride (NS) flush 5-40 mL  5-40 mL IntraVENous Q8H Candelario Alvarez MD   10 mL at 02/25/21 0600    sodium chloride (NS) flush 5-40 mL  5-40 mL IntraVENous PRN Lorena Negro MD        azithromycin (ZITHROMAX) 500 mg in 0.9% sodium chloride 250 mL (VIAL-MATE)  500 mg IntraVENous DAILY Sushant LENZ MD   500 mg at 02/25/21 0910    heparin (porcine) injection 5,000 Units  5,000 Units SubCUTAneous Q12H Lorena Negro MD   5,000 Units at 02/25/21 0300    glucose chewable tablet 16 g  4 Tab Oral PRN Lorena Negro MD        dextrose (D50W) injection syrg 12.5-25 g  25-50 mL IntraVENous PRN Lorena Negro MD        glucagon (GLUCAGEN) injection 1 mg  1 mg IntraMUSCular PRN Lorena Negro MD        piperacillin-tazobactam (ZOSYN) 3.375 g in 0.9% sodium chloride (MBP/ADV) 100 mL MBP  3.375 g IntraVENous Q12H Caridad Bazan  mL/hr at 02/25/21 0818 3.375 g at 02/25/21 0818        Review of Systems   Unable to perform ROS: Patient nonverbal       Objective     Vital signs for last 24 hours:  Visit Vitals  /68 (BP 1 Location: Left upper arm, BP Patient Position: At rest)   Pulse 85   Temp 97.1 °F (36.2 °C)   Resp 28   Ht 6' (1.829 m)   Wt 74.5 kg (164 lb 3.9 oz)   SpO2 96%   BMI 22.28 kg/m²       Intake/Output this shift:  Current Shift: 02/25 0701 - 02/25 1900  In: -   Out: 625 [Urine:625]  Last 3 Shifts: 02/23 1901 - 02/25 0700  In: 420.8 [I.V.:420.8]  Out: 2902 [Urine:1575]  Physical Exam   Constitutional: He appears well-developed and well-nourished. HENT:   Head: Normocephalic. Pulmonary/Chest: Effort normal and breath sounds normal.   Abdominal: Soft.  Bowel sounds are normal.   Skin: Skin is warm and dry. Nonverbal, lethargic  Not on oxygen anymore  Rectal tube in place with dark green liquid stool  Gonzales catheter with clear urine  Data Review:   Recent Results (from the past 24 hour(s))   GLUCOSE, POC    Collection Time: 02/25/21 12:00 AM   Result Value Ref Range    Glucose (POC) 327 (H) 65 - 100 mg/dL    Performed by Lindsay Cordero, RANDOM    Collection Time: 02/25/21  4:30 AM   Result Value Ref Range    Vancomycin, random 15.2 ug/mL   RENAL FUNCTION PANEL    Collection Time: 02/25/21  4:30 AM   Result Value Ref Range    Sodium 150 (H) 136 - 145 mmol/L    Potassium 3.5 3.5 - 5.1 mmol/L    Chloride 123 (H) 97 - 108 mmol/L    CO2 20 (L) 21 - 32 mmol/L    Anion gap 7 5 - 15 mmol/L    Glucose 367 (H) 65 - 100 mg/dL     (H) 6 - 20 mg/dL    Creatinine 3.88 (H) 0.70 - 1.30 mg/dL    BUN/Creatinine ratio 31 (H) 12 - 20      GFR est AA 18 (L) >60 ml/min/1.73m2    GFR est non-AA 15 (L) >60 ml/min/1.73m2    Calcium 7.8 (L) 8.5 - 10.1 mg/dL    Phosphorus 3.2 2.6 - 4.7 mg/dL    Albumin 1.8 (L) 3.5 - 5.0 g/dL   CBC WITH AUTOMATED DIFF    Collection Time: 02/25/21  4:30 AM   Result Value Ref Range    WBC 9.3 4.1 - 11.1 K/uL    RBC 2.63 (L) 4.10 - 5.70 M/uL    HGB 11.1 (L) 12.1 - 17.0 g/dL    HCT 33.4 (L) 36.6 - 50.3 %    .0 (H) 80.0 - 99.0 FL    MCH 42.2 (H) 26.0 - 34.0 PG    MCHC 33.2 30.0 - 36.5 g/dL    RDW 15.6 (H) 11.5 - 14.5 %    PLATELET 865 (L) 282 - 400 K/uL    MPV 12.6 8.9 - 12.9 FL    NEUTROPHILS 93 (H) 32 - 75 %    LYMPHOCYTES 5 (L) 12 - 49 %    MONOCYTES 2 (L) 5 - 13 %    EOSINOPHILS 0 0 - 7 %    BASOPHILS 0 0 - 1 %    IMMATURE GRANULOCYTES 0 0.0 - 0.5 %    ABS. NEUTROPHILS 8.7 (H) 1.8 - 8.0 K/UL    ABS. LYMPHOCYTES 0.5 (L) 0.8 - 3.5 K/UL    ABS. MONOCYTES 0.1 0.0 - 1.0 K/UL    ABS. EOSINOPHILS 0.0 0.0 - 0.4 K/UL    ABS. BASOPHILS 0.0 0.0 - 0.1 K/UL    ABS. IMM.  GRANS. 0.0 0.00 - 0.04 K/UL    DF AUTOMATED     GLUCOSE, POC    Collection Time: 02/25/21  5:41 AM   Result Value Ref Range    Glucose (POC) 364 (H) 65 - 100 mg/dL    Performed by Sharron Friday, POC    Collection Time: 02/25/21 12:04 PM   Result Value Ref Range    Glucose (POC) 348 (H) 65 - 100 mg/dL    Performed by Amalia Hoffmann          XR CHEST PORT   Final Result   Gastric tube unchanged from the prior exam and should be advanced 8   cm for optimal positioning. Persistent interstitial infiltrates, unchanged. Please see full report. I called the CCU with this result and recommendation at   4:31 PM. I spoke with Lorelei Caballero. She states the NG tube appears to be at its   distal aspect outside the patient. I suspect it is coiled within the throat as   the NG tube position appears unchanged from the earlier exam.      XR CHEST PORT   Final Result   Feeding tube tip at the GE junction. This should be advanced into   the stomach. Airspace opacities in the lungs, left greater than right, not   significantly changed. XR CHEST PORT   Final Result      US RETROPERITONEUM COMP   Final Result   Small right renal cyst. Mild left hydronephrosis of unclear   etiology. Gonzales catheter in the bladder. Please see full report. XR CHEST PORT   Final Result   New left lung opacities, nonspecific, but could represent an infectious process   in the appropriate clinical setting. Asymmetric edema could appear similar,   though is considered less likely. XR CHEST PORT    (Results Pending)        Patient Active Problem List   Diagnosis Code    Fall W19. XXXA    Weakness R53.1    Pneumonia J18.9    Renal failure N19    Sepsis ([facility] [address] A41.9        DIAGNOSES:  1. Acute kidney injury, grade 3 ( severe) with improvement in urine output  2. COVID-19 pneumonia  3. Moderate hypernatremia  4. Hyperchloremic metabolic acidosisbicarb better at 19 with anion gap of 7  5. Hypotension on pressors  6.  Encephalopathy      PLAN:  Urine output of 1.5 L  BUN has come down from 136 yesterday to 1.20 today  Creatinine has come down from 4.38 yesterday to 3.88 today. Hyperchloremic metabolic acidosis is improved to some extent. Albumin is very low at 1.8  Continue with current plan  I think patient is recovering from NATALI.

## 2021-02-25 NOTE — PROGRESS NOTES
Chart reviewed. Patient's wife does not want him returning to [facility] is possible, she would like him to return home. Patient should work with PT/OT once appropriate to determine safest discharge disposition. CM will continue to follow.

## 2021-02-25 NOTE — PROGRESS NOTES
Infectious Disease Progress Note           Subjective:   Remains clinically stable, confused, has been made a DNR by family. Not requiring pressor support. Maintaining O2 sats on RA  Objective:   Physical Exam:     Visit Vitals  /68 (BP 1 Location: Left upper arm, BP Patient Position: At rest)   Pulse 85   Temp 97.1 °F (36.2 °C)   Resp 28   Ht 6' (1.829 m)   Wt 164 lb 3.9 oz (74.5 kg)   SpO2 96%   BMI 22.28 kg/m²    O2 Flow Rate (L/min): 1 l/min(placed patient on room air) O2 Device: Room air    Temp (24hrs), Av.2 °F (36.2 °C), Min:96.6 °F (35.9 °C), Max:98 °F (36.7 °C)    701 - 1900  In: -   Out: 625 [Urine:625]   1901 -  07  In: 420.8 [I.V.:420.8]  Out: 0814 [Urine:1575]    General: NAD, decreased responsiveness, not following commands  HEENT: JUAN CARLOS, Moist mucosa   Lungs: Decreased at the bases, no wheeze/rhonchi  Heart: S1S2+, RRR, no murmur  Abdo: Soft, NT, ND, +BS   : + indwelling benjamin cath   Exts: Trace edema, + pulses b/l   Skin: No wounds, No rashes or lesions    Data Review:       Recent Days:  Recent Labs     21  0430 21  0300   WBC 9.3 12.7*   HGB 11.1* 11.3*   HCT 33.4* 34.6*   * 151     Recent Labs     21  0430 21  0330 21  0925   * 136* 141*   CREA 3.88* 4.38* 5.04*       Lab Results   Component Value Date/Time    C-Reactive protein 4.33 (H) 2021 03:30 AM        Microbiology   - Urine Cx : Pending   - Blood Cx : GPC in all 4 bottles     Diagnostics   CXR Results  (Last 48 hours)               21 1621  XR CHEST PORT Final result    Impression:  Gastric tube unchanged from the prior exam and should be advanced 8   cm for optimal positioning. Persistent interstitial infiltrates, unchanged. Please see full report. I called the CCU with this result and recommendation at   4:31 PM. I spoke with Amy Edge.  She states the NG tube appears to be at its   distal aspect outside the patient. I suspect it is coiled within the throat as   the NG tube position appears unchanged from the earlier exam.       Narrative:  Comparison is with the chest x-ray of earlier the same day at 12:25 PM. The   current study is timed 1614 hours. History is NG tube placement. The NG tube side port remains in the distal esophagus and should be inserted at   least 8 cm for optimal positioning. There is a right upper extremity PICC line   with the tip overlying the superior vena cava. Cardiac monitor leads are   present. The heart is mildly enlarged but this is a portable film. The aorta is   calcified and mildly tortuous. There is patchy interstitial infiltration of the   left mid to lower lung zone and medial right lower lobe, unchanged from the   earlier exam. There is no effusion or pneumothorax. Calcified granulomas are   noted. There is mild degenerative change of the thoracic spine. 02/24/21 1229  XR CHEST PORT Final result    Impression:  Feeding tube tip at the GE junction. This should be advanced into   the stomach. Airspace opacities in the lungs, left greater than right, not   significantly changed. Narrative:  Chest, frontal view, 2/24/2021       History: NG tube placement. Comparison: Including chest 2/23/2021. Findings: Feeding tube side-port is at the distal thoracic esophagus and tip at   the GE junction. This should be advanced further into the stomach. Right-sided   PICC line tip is at the mid to distal SVC. The cardiac silhouette is stable. The lungs are adequately expanded. No hydrostatic edema is seen. Airspace   opacities at the left mid to lower lung and right base are not significantly   changed. No pleural effusion or pneumothorax is identified. Calcified   granulomas are again noted. The osseous structures are stable. Assessment/Plan     1.   Sepsis on admission due  UTI, suspected aspiration PNA/line infection        Was hypotensive, briefly required pressor support which has been weaned off       Currently afebrile, WBC normalized on today's labs       On day # 5 of Zosyn and day # 3 of Vanc        Routine labs in the morning       2. Coag neg staph bacteremia in the setting of right arm PICC line      Isolated from 1/4 BCx btles collected in the ED, repeat  On 02/23 is neg       Continue on Vanc Day # 3/7 due to underlying PICC line, though GPC may jt be a skin contaminant      3. UTI, abnormal UA, K. Pneumoniae and E.faecalis isolated from Cx. + benjamin cath. On day # 5 of Zosyn     4. Acute renal failure: Cr trending down w fluids     5. AMS: Remains altered, minimally responsive, not following commands     6. Suspected aspiration PNA: new LLL infiltrate on admission CXR       Low suspicion for COVID-19, sample collected today (02/25). On RA. 7. Dysphagia:  Not safe for oral intake, feeding recommended by speech      Unable to insert NGT per RN, to be started on PPN/TPN.        Peg placement is being considered     Antonietta Johnson MD    2/25/2021

## 2021-02-26 ENCOUNTER — APPOINTMENT (OUTPATIENT)
Dept: GENERAL RADIOLOGY | Age: 86
DRG: 871 | End: 2021-02-26
Attending: INTERNAL MEDICINE
Payer: MEDICARE

## 2021-02-26 ENCOUNTER — APPOINTMENT (OUTPATIENT)
Dept: NON INVASIVE DIAGNOSTICS | Age: 86
DRG: 871 | End: 2021-02-26
Attending: INTERNAL MEDICINE
Payer: MEDICARE

## 2021-02-26 LAB
CREAT SERPL-MCNC: 2.5 MG/DL (ref 0.7–1.3)
DATE LAST DOSE: 0
GLUCOSE BLD STRIP.AUTO-MCNC: 218 MG/DL (ref 65–100)
GLUCOSE BLD STRIP.AUTO-MCNC: 279 MG/DL (ref 65–100)
GLUCOSE BLD STRIP.AUTO-MCNC: 323 MG/DL (ref 65–100)
GLUCOSE BLD STRIP.AUTO-MCNC: 346 MG/DL (ref 65–100)
PERFORMED BY, TECHID: ABNORMAL
REPORTED DOSE,DOSE: 0 UNITS
SARS-COV-2, COV2NT: DETECTED
VANCOMYCIN SERPL-MCNC: 15.5 UG/ML

## 2021-02-26 PROCEDURE — 65610000006 HC RM INTENSIVE CARE

## 2021-02-26 PROCEDURE — 74011250636 HC RX REV CODE- 250/636: Performed by: INTERNAL MEDICINE

## 2021-02-26 PROCEDURE — 74011000258 HC RX REV CODE- 258: Performed by: INTERNAL MEDICINE

## 2021-02-26 PROCEDURE — 74011000258 HC RX REV CODE- 258: Performed by: NURSE PRACTITIONER

## 2021-02-26 PROCEDURE — 80202 ASSAY OF VANCOMYCIN: CPT

## 2021-02-26 PROCEDURE — 82962 GLUCOSE BLOOD TEST: CPT

## 2021-02-26 PROCEDURE — 71045 X-RAY EXAM CHEST 1 VIEW: CPT

## 2021-02-26 PROCEDURE — 74011636637 HC RX REV CODE- 636/637: Performed by: INTERNAL MEDICINE

## 2021-02-26 PROCEDURE — 93971 EXTREMITY STUDY: CPT

## 2021-02-26 PROCEDURE — 99232 SBSQ HOSP IP/OBS MODERATE 35: CPT | Performed by: INTERNAL MEDICINE

## 2021-02-26 PROCEDURE — 74011000250 HC RX REV CODE- 250: Performed by: NURSE PRACTITIONER

## 2021-02-26 PROCEDURE — 82565 ASSAY OF CREATININE: CPT

## 2021-02-26 PROCEDURE — 36415 COLL VENOUS BLD VENIPUNCTURE: CPT

## 2021-02-26 PROCEDURE — 74011000250 HC RX REV CODE- 250: Performed by: INTERNAL MEDICINE

## 2021-02-26 RX ADMIN — Medication 10 ML: at 15:01

## 2021-02-26 RX ADMIN — Medication 5 MG/HR: at 10:48

## 2021-02-26 RX ADMIN — DEXAMETHASONE SODIUM PHOSPHATE 4 MG: 4 INJECTION, SOLUTION INTRA-ARTICULAR; INTRALESIONAL; INTRAMUSCULAR; INTRAVENOUS; SOFT TISSUE at 23:32

## 2021-02-26 RX ADMIN — DEXAMETHASONE SODIUM PHOSPHATE 4 MG: 4 INJECTION, SOLUTION INTRA-ARTICULAR; INTRALESIONAL; INTRAMUSCULAR; INTRAVENOUS; SOFT TISSUE at 09:30

## 2021-02-26 RX ADMIN — INSULIN LISPRO 6 UNITS: 100 INJECTION, SOLUTION INTRAVENOUS; SUBCUTANEOUS at 00:43

## 2021-02-26 RX ADMIN — FAMOTIDINE 20 MG: 10 INJECTION INTRAVENOUS at 09:30

## 2021-02-26 RX ADMIN — ASCORBIC ACID, VITAMIN A PALMITATE, CHOLECALCIFEROL, THIAMINE HYDROCHLORIDE, RIBOFLAVIN-5 PHOSPHATE SODIUM, PYRIDOXINE HYDROCHLORIDE, NIACINAMIDE, DEXPANTHENOL, ALPHA-TOCOPHEROL ACETATE, VITAMIN K1, FOLIC ACID, BIOTIN, CYANOCOBALAMIN: 200; 3300; 200; 6; 3.6; 6; 40; 15; 10; 150; 600; 60; 5 INJECTION, SOLUTION INTRAVENOUS at 23:49

## 2021-02-26 RX ADMIN — DEXTROSE MONOHYDRATE 150 ML/HR: 50 INJECTION, SOLUTION INTRAVENOUS at 09:35

## 2021-02-26 RX ADMIN — DEXTROSE MONOHYDRATE 150 ML/HR: 50 INJECTION, SOLUTION INTRAVENOUS at 09:39

## 2021-02-26 RX ADMIN — DEXTROSE MONOHYDRATE 40 ML/HR: 100 INJECTION, SOLUTION INTRAVENOUS at 12:59

## 2021-02-26 RX ADMIN — HEPARIN SODIUM 5000 UNITS: 5000 INJECTION INTRAVENOUS; SUBCUTANEOUS at 15:13

## 2021-02-26 RX ADMIN — AZITHROMYCIN DIHYDRATE 500 MG: 500 INJECTION, POWDER, LYOPHILIZED, FOR SOLUTION INTRAVENOUS at 09:44

## 2021-02-26 RX ADMIN — VANCOMYCIN HYDROCHLORIDE 1000 MG: 1 INJECTION, POWDER, LYOPHILIZED, FOR SOLUTION INTRAVENOUS at 15:00

## 2021-02-26 RX ADMIN — I.V. FAT EMULSION 250 ML: 20 EMULSION INTRAVENOUS at 23:49

## 2021-02-26 RX ADMIN — HEPARIN SODIUM 5000 UNITS: 5000 INJECTION INTRAVENOUS; SUBCUTANEOUS at 04:19

## 2021-02-26 RX ADMIN — PIPERACILLIN AND TAZOBACTAM 3.38 G: 3; .375 INJECTION, POWDER, LYOPHILIZED, FOR SOLUTION INTRAVENOUS at 23:32

## 2021-02-26 RX ADMIN — PIPERACILLIN AND TAZOBACTAM 3.38 G: 3; .375 INJECTION, POWDER, LYOPHILIZED, FOR SOLUTION INTRAVENOUS at 09:35

## 2021-02-26 RX ADMIN — INSULIN LISPRO 8 UNITS: 100 INJECTION, SOLUTION INTRAVENOUS; SUBCUTANEOUS at 12:59

## 2021-02-26 RX ADMIN — INSULIN LISPRO 4 UNITS: 100 INJECTION, SOLUTION INTRAVENOUS; SUBCUTANEOUS at 23:46

## 2021-02-26 RX ADMIN — INSULIN LISPRO 6 UNITS: 100 INJECTION, SOLUTION INTRAVENOUS; SUBCUTANEOUS at 18:05

## 2021-02-26 RX ADMIN — INSULIN LISPRO 8 UNITS: 100 INJECTION, SOLUTION INTRAVENOUS; SUBCUTANEOUS at 06:30

## 2021-02-26 NOTE — PROGRESS NOTES
Progress Note      2/25/21  9:50AM  NAME: Ramirez Barton   MRN:  [id number]   Admit Diagnosis: Sepsis ([facility] [address] [A41.9]  Pneumonia [J18.9]  Renal failure [N19]      Problem List:   -Atrial fibrillation  -Hypertension  -Dyslipidemia  -Hypothyroidism  -Sepsis  -History of TIA  -Spinal stenosis       Assessment/Plan:   Note dictated February 26, 2021.  -Patient is lying comfortably in the bed and is still in atrial fibrillation with controlled heart rate.  -Respond to vocal command or minimum painful stimuli.  -No acute cardiac issue at this point for me to be aggressive in his cardiac care so we will continue current conservative medical management and follow him on as needed basis from now onward while he is in the hospital.  ------------------------------------------------------------------------------------------------------------  2/25/21  - patient resting in bed with eyes opened. Disoriented x 3. No acute distress noted. - atrial fibrillation on telemetry 80-90 bpm. controlled  - no acute cardiovascular events overnight  - troponin continues to trend downward  - continue cardizem IV with goal heart rate less than 100bpm  - will continue conservative cardiovascular management at this time and monitor patient during hospitalization.  ==========================================================  2/24/21  - patient observed lying in bed with eyes closed. Arouses when name is called. - - non-verbal, no acute distress noted. - atrial fibrillation on telemetry 110-120bpm.  - no acute events overnight.   - troponin trending downward  - will start on digoxin with caution  with the goal to control heart rate less than 100bpm to minimize the risk of tachycardia induced cardiomyopathy.  - Considering renal insufficiency I will give him only one dose and replace K+  aggressively  - if the medical management fails we will consider SHELIA cardioversion.  ==========================================================  -Follow-up visit from cardiology secondary to borderline increased troponin  -Patient is lying comfortably in the bed but did not respond or engage with verbal communication.  -Monitor shows atrial fibrillation with controlled heart rate  -Normal ejection fraction of 79%.  -No acute cardiac issue based on my overall cardiac assessment.  -This was a limited examination this morning as patient has tenderness which could be consistent with COVID-19 infection.  -No further cardiovascular testing warranted at this time based on my overall cardiac assessment and we will continue to watch him while he is in the hospital along with the team with conservative approach         []       High complexity decision making was performed in this patient at high risk for decompensation with multiple organ involvement. Subjective:     Governor Gottron denies chest pain, dyspnea. Discussed with RN events overnight. Review of Systems: Patient is non-verbal. No acute distress noted. Objective:      Physical Exam:    Last 24hrs VS reviewed since prior progress note. Most recent are:    Visit Vitals  BP (!) 117/55 (BP 1 Location: Left upper arm, BP Patient Position: At rest)   Pulse 74   Temp 97.1 °F (36.2 °C)   Resp (!) 31   Ht 6' (1.829 m)   Wt 74.5 kg (164 lb 3.9 oz)   SpO2 96%   BMI 22.28 kg/m²       Intake/Output Summary (Last 24 hours) at 2/26/2021 1129  Last data filed at 2/26/2021 0610  Gross per 24 hour   Intake 1540.25 ml   Output 1650 ml   Net -109.75 ml        General Appearance: ill-appearing, no acute distress noted. PMH/SH reviewed - no change compared to H&P    Data Review    Telemetry: atrial fibrillation    Result status: Final result   · LV: Calculated LVEF is 79%. Normal cavity size, wall thickness, systolic function (ejection fraction normal) and diastolic function. Wall motion: normal. Normal left ventricular strain. · LA: Moderately dilated left atrium. · RV: Mildly dilated right ventricle.  Mildly reduced systolic function. · RA: Mildly dilated right atrium. · AV: Aortic valve leaflet calcification present. Mild aortic valve regurgitation is present. · MV: Mild mitral valve regurgitation is present. · TV: Mild tricuspid valve regurgitation is present. RVSP 41.  · Pericardium: Trivial pericardial effusion. Echo Findings    Left Ventricle Normal cavity size, wall thickness, systolic function (ejection fraction normal) and diastolic function. The muscle mass is normal. The cavity shape is normal. Wall motion: normal. The calculated EF is 79%. End-systolic volume is normal. Normal left ventricular strain. Normal left ventricular diastolic pressure. End-diastolic volume is normal.   Left Atrium Moderately dilated left atrium. Right Ventricle Mildly dilated right ventricle. Mildly reduced systolic function. Right Atrium Mildly dilated right atrium. Interatrial Septum Interatrial septum not well visualized   Aortic Valve No stenosis. There is leaflet calcification. Mild aortic valve regurgitation. Mitral Valve Normal valve structure and no stenosis. Mild regurgitation. Tricuspid Valve Normal valve structure and no stenosis. Mild regurgitation. Pulmonic Valve Pulmonic valve not well visualized. Aorta The aorta was not well visualized. Pulmonary Artery Pulmonary arteries not well visualized. IVC/Hepatic Veins Normal structure. Normal central venous pressure (3 mmHg); IVC diameter is less than 21 mm and collapses more than 50% with respiration. Pericardium Trivial pericardial effusion noted. Carotid Duplex 2/5/21:    Right Carotid    There is mild stenosis in the right ICA (<50%). The right vertebral is antegrade. Left Carotid    There is mild stenosis in the left ICA (<50%). The left vertebral is antegrade. Results for Alverta Laming (MRN [id number]) as of 2/25/2021 10:13   Ref.  Range 2/25/2021 04:30   Sodium Latest Ref Range: 136 - 145 mmol/L 150 (H)   Potassium Latest Ref Range: 3.5 - 5.1 mmol/L 3.5   Chloride Latest Ref Range: 97 - 108 mmol/L 123 (H)   CO2 Latest Ref Range: 21 - 32 mmol/L 20 (L)   Anion gap Latest Ref Range: 5 - 15 mmol/L 7   Glucose Latest Ref Range: 65 - 100 mg/dL 367 (H)   BUN Latest Ref Range: 6 - 20 mg/dL 120 (H)   Creatinine Latest Ref Range: 0.70 - 1.30 mg/dL 3.88 (H)   BUN/Creatinine ratio Latest Ref Range: 12 - 20   31 (H)   Calcium Latest Ref Range: 8.5 - 10.1 mg/dL 7.8 (L)   Phosphorus Latest Ref Range: 2.6 - 4.7 mg/dL 3.2   GFR est non-AA Latest Ref Range: >60 ml/min/1.73m2 15 (L)   GFR est AA Latest Ref Range: >60 ml/min/1.73m2 18 (L)   Albumin Latest Ref Range: 3.5 - 5.0 g/dL 1.8 (L)   Results for Diony Borjas (MRN [id number]) as of 2/25/2021 10:13   Ref. Range 2/25/2021 04:30   WBC Latest Ref Range: 4.1 - 11.1 K/uL 9.3   RBC Latest Ref Range: 4.10 - 5.70 M/uL 2.63 (L)   HGB Latest Ref Range: 12.1 - 17.0 g/dL 11.1 (L)   HCT Latest Ref Range: 36.6 - 50.3 % 33.4 (L)   MCV Latest Ref Range: 80.0 - 99.0 .0 (H)   MCH Latest Ref Range: 26.0 - 34.0 PG 42.2 (H)   MCHC Latest Ref Range: 30.0 - 36.5 g/dL 33.2   RDW Latest Ref Range: 11.5 - 14.5 % 15.6 (H)   PLATELET Latest Ref Range: 150 - 400 K/uL 126 (L)   MPV Latest Ref Range: 8.9 - 12.9 FL 12.6   NEUTROPHILS Latest Ref Range: 32 - 75 % 93 (H)   LYMPHOCYTES Latest Ref Range: 12 - 49 % 5 (L)   MONOCYTES Latest Ref Range: 5 - 13 % 2 (L)   EOSINOPHILS Latest Ref Range: 0 - 7 % 0   BASOPHILS Latest Ref Range: 0 - 1 % 0   IMMATURE GRANULOCYTES Latest Ref Range: 0.0 - 0.5 % 0   DF Latest Units:   AUTOMATED   ABS. NEUTROPHILS Latest Ref Range: 1.8 - 8.0 K/UL 8.7 (H)   ABS. IMM. GRANS. Latest Ref Range: 0.00 - 0.04 K/UL 0.0   ABS. LYMPHOCYTES Latest Ref Range: 0.8 - 3.5 K/UL 0.5 (L)   ABS. MONOCYTES Latest Ref Range: 0.0 - 1.0 K/UL 0.1   ABS. EOSINOPHILS Latest Ref Range: 0.0 - 0.4 K/UL 0.0   ABS.  BASOPHILS Latest Ref Range: 0.0 - 0.1 K/UL 0.0     Current IP Meds  Comment  Hide  (From admission, onward) Last edited by  on  at    Start   Stop Status Route Frequency Ordered   02/24/21 2100  dexamethasone (DECADRON) 4 mg/mL injection 4 mg    Discontinue    -- Dispensed IV EVERY 12 HOURS 02/24/21 1219   02/24/21 1700  dilTIAZem (CARDIZEM) 125 mg/125 mL (1 mg/mL) dextrose 5% infusion    Discontinue    -- Verified IV TITRATE 02/24/21 1646   02/24/21 1200  LORazepam (ATIVAN) injection 1 mg      02/24 1145 Completed IV ONCE 02/24/21 1018   02/24/21 1200  insulin lispro (HUMALOG) injection (CORRECTIONAL SCALE LISPRO PANEL)    Discontinue    -- Dispensed SC EVERY 6 HOURS 02/24/21 1018   02/23/21 1737  dextrose (D50W) injection syrg 12.5 g    Discontinue    -- Verified IV AS NEEDED 02/23/21 1741   02/23/21 1500  dextrose 5% infusion    Discontinue    -- Dispensed IV CONTINUOUS 02/23/21 1458   02/22/21 1600  midodrine (PROAMATINE) tablet 5 mg    Discontinue    -- Verified PO 3 TIMES DAILY AS NEEDED 02/22/21 1523   02/22/21 1400  famotidine (PF) (PEPCID) 20 mg in 0.9% sodium chloride 10 mL injection    Discontinue    -- Dispensed IV DAILY 02/22/21 1307   02/22/21 1341  Vancomycin dose per pharmacy protocol    Discontinue    -- Dispensed OTHER RX DOSING/MONITORING 02/22/21 1341   02/22/21 0900  azithromycin (ZITHROMAX) 500 mg in 0.9% sodium chloride 250 mL (VIAL-MATE)    Discontinue    -- Dispensed IV DAILY 02/21/21 1457   02/22/21 0900  NOREPINephrine (LEVOPHED) 8 mg in 5% dextrose 250mL (32 mcg/mL) infusion    Discontinue    -- Verified IV TITRATE 02/22/21 0806   02/21/21 2100  piperacillin-tazobactam (ZOSYN) 3.375 g in 0.9% sodium chloride (MBP/ADV) 100 mL MBP    Discontinue    03/07 2059 Dispensed IV EVERY 12 HOURS 02/21/21 1516   02/21/21 1510  glucose chewable tablet 16 g (HYPOGLYCEMIC PROTOCOL)    Discontinue    -- Dispensed PO AS NEEDED 02/21/21 1512   02/21/21 1510  dextrose (D50W) injection syrg 12.5-25 g (HYPOGLYCEMIC PROTOCOL)    Discontinue    -- Dispensed IV AS NEEDED 02/21/21 1512 02/21/21 1510  glucagon (GLUCAGEN) injection 1 mg (HYPOGLYCEMIC PROTOCOL)    Discontinue    -- Verified IM AS NEEDED 02/21/21 1512   02/21/21 1500  sodium chloride (NS) flush 5-40 mL (SALINE LOCK FLUSH PANEL)    Discontinue    -- Dispensed IV EVERY 8 HOURS 02/21/21 1457   02/21/21 1500  heparin (porcine) injection 5,000 Units    Discontinue    -- Dispensed SC EVERY 12 HOURS 02/21/21 1457   02/21/21 1450  sodium chloride (NS) flush 5-40 mL (SALINE LOCK FLUSH PANEL)    Discontinue    -- Dispensed IV AS NEEDED 02/21/21 1457       Aury Mcknight MD

## 2021-02-26 NOTE — PROGRESS NOTES
Patient is seen and examined  Lethargic, withdrawn and not verbal  Discussed with ICU RN    Past Medical History:   Diagnosis Date    Atrial fibrillation ([facility] [address]     Diabetes mellitus type 2, uncomplicated ([facility] [address]     Hyperlipidemia     Hypertension     Hypothyroidism     Polycythemia vera ([facility] [address]     Prostatic hyperplasia     Spinal stenosis     Transient ischemic attack       Past Surgical History:   Procedure Laterality Date    HX OTHER SURGICAL      None     Family History   Problem Relation Age of Onset    Hypertension Mother     Stroke Mother     Lung Cancer Father     Melanoma Father       Social History     Tobacco Use    Smoking status: Unknown If Ever Smoked   Substance Use Topics    Alcohol use: Never     Frequency: Never     Binge frequency: Never       Current Facility-Administered Medications   Medication Dose Route Frequency Provider Last Rate Last Admin    vancomycin (VANCOCIN) 1,000 mg in 0.9% sodium chloride 250 mL (VIAL-MATE)  1,000 mg IntraVENous ONCE Milly Stuart MD   1,000 mg at 02/26/21 1500    amino acid 4.25 % dextrose 5 % with electrolytes (CLINIMIX E) infusion   IntraVENous CONTINUOUS Matulin, Laura R., NP        fat emulsion 20% (LIPOSYN, INTRAlipid) infusion 250 mL  250 mL IntraVENous CONTINUOUS Matulin, Laura R., NP        dextrose 10% infusion  40 mL/hr IntraVENous CONTINUOUS Matulin, Laura R., NP 40 mL/hr at 02/26/21 1259 40 mL/hr at 02/26/21 1259    insulin lispro (HUMALOG) injection   SubCUTAneous Q6H Sushant LENZ MD   8 Units at 02/26/21 1259    dexamethasone (DECADRON) 4 mg/mL injection 4 mg  4 mg IntraVENous Q12H Joselito Haile MD   4 mg at 02/26/21 0930    dilTIAZem (CARDIZEM) 125 mg/125 mL (1 mg/mL) dextrose 5% infusion  0-15 mg/hr IntraVENous TITRATE Matthew He MD 5 mL/hr at 02/26/21 1048 5 mg/hr at 02/26/21 1048    dextrose 5% infusion  150 mL/hr IntraVENous CONTINUOUS Sushant LENZ  mL/hr at 02/26/21 0939 150 mL/hr at 02/26/21 0939    dextrose (D50W) injection syrg 12.5 g  25 mL IntraVENous PRN Kristine LENZ MD   12.5 g at 02/24/21 0957    NOREPINephrine (LEVOPHED) 8 mg in 5% dextrose 250mL (32 mcg/mL) infusion  0.5-16 mcg/min IntraVENous TITRATE Kristine LENZ MD   Stopped at 02/22/21 0800    famotidine (PF) (PEPCID) 20 mg in 0.9% sodium chloride 10 mL injection  20 mg IntraVENous DAILY Magali Galdamez MD   20 mg at 02/26/21 0930    Vancomycin dose per pharmacy protocol   Other Rx Dosing/Monitoring Delaney Flynn MD        midodrine (PROAMATINE) tablet 5 mg  5 mg Oral TID PRN Delaney Flynn MD        sodium chloride (NS) flush 5-40 mL  5-40 mL IntraVENous Q8H Joi Jolly MD   10 mL at 02/26/21 1501    sodium chloride (NS) flush 5-40 mL  5-40 mL IntraVENous PRN Joi Jolly MD        azithromycin (ZITHROMAX) 500 mg in 0.9% sodium chloride 250 mL (VIAL-MATE)  500 mg IntraVENous DAILY Joi Jolly MD   500 mg at 02/26/21 0944    heparin (porcine) injection 5,000 Units  5,000 Units SubCUTAneous Q12H Joi Jolly MD   5,000 Units at 02/26/21 0419    glucose chewable tablet 16 g  4 Tab Oral PRN Joi Jolly MD        dextrose (D50W) injection syrg 12.5-25 g  25-50 mL IntraVENous PRN Joi Jolly MD        glucagon (GLUCAGEN) injection 1 mg  1 mg IntraMUSCular PRN Joi Jolly MD        piperacillin-tazobactam (ZOSYN) 3.375 g in 0.9% sodium chloride (MBP/ADV) 100 mL MBP  3.375 g IntraVENous Q12H Delaney Flynn  mL/hr at 02/26/21 0935 3.375 g at 02/26/21 0935        Review of Systems   Unable to perform ROS: Patient nonverbal       Objective     Vital signs for last 24 hours:  Visit Vitals  /84 (BP 1 Location: Left upper arm, BP Patient Position: At rest)   Pulse 86   Temp 97.4 °F (36.3 °C)   Resp 29   Ht 6' (1.829 m)   Wt 74.5 kg (164 lb 3.9 oz)   SpO2 96%   BMI 22.28 kg/m²       Intake/Output this shift:  Current Shift: 02/26 0701 - 02/26 1900  In: - Out: 750 [Urine:750]  Last 3 Shifts: 02/24 1901 - 02/26 0700  In: 1540.3 [I.V.:1540.3]  Out: 8016 [Urine:2275]  Physical Exam   Constitutional: He appears well-developed and well-nourished. HENT:   Head: Normocephalic. Pulmonary/Chest: Effort normal and breath sounds normal.   Abdominal: Soft. Bowel sounds are normal.   Skin: Skin is warm and dry. Nonverbal, lethargic  Not on oxygen anymore  Rectal tube in place with dark green liquid stool  Gonzales catheter with clear urine  Data Review:   Recent Results (from the past 24 hour(s))   GLUCOSE, POC    Collection Time: 02/25/21  5:36 PM   Result Value Ref Range    Glucose (POC) 175 (H) 65 - 100 mg/dL    Performed by Cleophus Severance, POC    Collection Time: 02/25/21 11:32 PM   Result Value Ref Range    Glucose (POC) 290 (H) 65 - 100 mg/dL    Performed by Kim Saldaña, RANDOM    Collection Time: 02/26/21  4:50 AM   Result Value Ref Range    Vancomycin, random 15.5 ug/mL    Reported dose date 0      Reported dose: 0 Units   CREATININE    Collection Time: 02/26/21  4:50 AM   Result Value Ref Range    Creatinine 2.50 (H) 0.70 - 1.30 mg/dL   GLUCOSE, POC    Collection Time: 02/26/21  6:19 AM   Result Value Ref Range    Glucose (POC) 323 (H) 65 - 100 mg/dL    Performed by Marc Renner    GLUCOSE, POC    Collection Time: 02/26/21 11:20 AM   Result Value Ref Range    Glucose (POC) 346 (H) 65 - 100 mg/dL    Performed by Nayeli Alicea          XR CHEST PORT   Final Result      XR CHEST PORT   Final Result   Gastric tube unchanged from the prior exam and should be advanced 8   cm for optimal positioning. Persistent interstitial infiltrates, unchanged. Please see full report. I called the CCU with this result and recommendation at   4:31 PM. I spoke with Francois Stapleton. She states the NG tube appears to be at its   distal aspect outside the patient.  I suspect it is coiled within the throat as   the NG tube position appears unchanged from the earlier exam.      XR CHEST PORT   Final Result   Feeding tube tip at the GE junction. This should be advanced into   the stomach. Airspace opacities in the lungs, left greater than right, not   significantly changed. XR CHEST PORT   Final Result      US RETROPERITONEUM COMP   Final Result   Small right renal cyst. Mild left hydronephrosis of unclear   etiology. Gonzales catheter in the bladder. Please see full report. XR CHEST PORT   Final Result   New left lung opacities, nonspecific, but could represent an infectious process   in the appropriate clinical setting. Asymmetric edema could appear similar,   though is considered less likely. Patient Active Problem List   Diagnosis Code    Fall W19. XXXA    Weakness R53.1    Pneumonia J18.9    Renal failure N19    Sepsis ([facility] [address] A41.9        DIAGNOSES:  1. Acute kidney injury, grade 3 ( severe) with improvement in urine output  2. COVID-19 pneumonia  3. Moderate hypernatremia  4. Hyperchloremic metabolic acidosisbicarb better at 19 with anion gap of 7  5. Hypotension on pressors  6.  Encephalopathy      PLAN:  Continued improvement in renal function noted with good urine output  Urine output of 2.2 L noted  We also keeping the fluid patient in negative fluid balance  Yesterday sodium was 150 and today sodium is not available for some reason  Please track daily labs  Dr. Vladimir Fair covering for me tomorrow

## 2021-02-26 NOTE — PROGRESS NOTES
Progress Note    Patient: Khanh Gray MRN: [id number]  SSN: [id number]    YOB: 1934  Age: 80 y.o. Sex: male      Admit Date: 2/21/2021    LOS: 5 days     Subjective:     Patient seen in ICU, lethargic but responsive to voice. He did not talk much. -PEG tube placement is pending. PPN to be started today  -Patient is admitted to the hospital for hypotension and altered mental status. He is Covid positive with severe sepsis. DNR status.  -Edema to his right arm, possible DVT. Pending duplex ultrasound result. Objective:     Vitals:    02/26/21 1000 02/26/21 1100 02/26/21 1200 02/26/21 1300   BP: 136/87 (!) 137/95 136/86 137/84   Pulse: 81 83 81 86   Resp: 28 28 29 29   Temp:  97.4 °F (36.3 °C)     SpO2: 95% 96%     Weight:       Height:            Intake and Output:  Current Shift: 02/26 0701 - 02/26 1900  In: -   Out: 750 [Urine:750]  Last three shifts: 02/24 1901 - 02/26 0700  In: 1540.3 [I.V.:1540.3]  Out: 2275 [Urine:2275]    Physical Exam:   Skin:  Extremities and face reveal no rashes. No vanegas erythema. HEENT: Sclerae anicteric. Extra-occular muscles are intact. No abnormal pigmentation of the lips. The neck is supple. Cardiovascular: Regular rate and rhythm. Respiratory:  Comfortable breathing with no accessory muscle use. GI:  Abdomen nondistended, soft, and nontender. No enlargement of the liver or spleen. No masses palpable. Rectal: flexiseal  Musculoskeletal: Extremities have good range of motion. Neurological: lethargic, responsive to voice. Psychiatric:  Unable to assess.    Lymphatic:  No visible adenopathy      Lab/Data Review:  Recent Results (from the past 24 hour(s))   GLUCOSE, POC    Collection Time: 02/25/21  5:36 PM   Result Value Ref Range    Glucose (POC) 175 (H) 65 - 100 mg/dL    Performed by [address] [place] [address], POC    Collection Time: 02/25/21 11:32 PM   Result Value Ref Range    Glucose (POC) 290 (H) 65 - 100 mg/dL    Performed by Hua Lugo VANCOMYCIN, RANDOM    Collection Time: 02/26/21  4:50 AM   Result Value Ref Range    Vancomycin, random 15.5 ug/mL    Reported dose date 0      Reported dose: 0 Units   CREATININE    Collection Time: 02/26/21  4:50 AM   Result Value Ref Range    Creatinine 2.50 (H) 0.70 - 1.30 mg/dL   GLUCOSE, POC    Collection Time: 02/26/21  6:19 AM   Result Value Ref Range    Glucose (POC) 323 (H) 65 - 100 mg/dL    Performed by Melissa Cid    GLUCOSE, POC    Collection Time: 02/26/21 11:20 AM   Result Value Ref Range    Glucose (POC) 346 (H) 65 - 100 mg/dL    Performed by Willy Cedeno               XR CHEST PORT   Final Result      XR CHEST PORT   Final Result   Gastric tube unchanged from the prior exam and should be advanced 8   cm for optimal positioning. Persistent interstitial infiltrates, unchanged. Please see full report. I called the CCU with this result and recommendation at   4:31 PM. I spoke with Garth Caputo. She states the NG tube appears to be at its   distal aspect outside the patient. I suspect it is coiled within the throat as   the NG tube position appears unchanged from the earlier exam.      XR CHEST PORT   Final Result   Feeding tube tip at the GE junction. This should be advanced into   the stomach. Airspace opacities in the lungs, left greater than right, not   significantly changed. XR CHEST PORT   Final Result      US RETROPERITONEUM COMP   Final Result   Small right renal cyst. Mild left hydronephrosis of unclear   etiology. Gonzales catheter in the bladder. Please see full report. XR CHEST PORT   Final Result   New left lung opacities, nonspecific, but could represent an infectious process   in the appropriate clinical setting. Asymmetric edema could appear similar,   though is considered less likely. Assessment:     Active Problems:    Pneumonia (2/21/2021)      Renal failure (2/21/2021)      Sepsis ([address] (2/21/2021)        Plan:   PPN to be started.   Continue current care plan. Patient discussed with Dr Nolvia Bellamy and agrees to above impression and plan. Thank you for allowing me to participate in this patients care    Signed By: Mickey Arias.  SUZI Pillai     February 26, 2021

## 2021-02-26 NOTE — PROGRESS NOTES
PT eval order received and acknowledged. PT eval attempted at 11:59 am however pt currently undergoing ultrasound in room. Will continue to follow patient and attempt PT eval at a later time. Thank you.

## 2021-02-26 NOTE — PROGRESS NOTES
Progress Note    Patient: Keon Palmer MRN: [id number]  SSN: [id number]    YOB: 1934  Age: 80 y.o. Sex: male      Admit Date: 2/21/2021    LOS: 5 days     Subjective:     Patient is nonverbal.  Onroom air    Objective:     Vitals:    02/26/21 1100 02/26/21 1200 02/26/21 1300 02/26/21 1500   BP: (!) 137/95 136/86 137/84    Pulse: 83 81 86    Resp: 28 29 29    Temp: 97.4 °F (36.3 °C)   98 °F (36.7 °C)   SpO2: 96%      Weight:       Height:            Intake and Output:  Current Shift: 02/26 0701 - 02/26 1900  In: -   Out: 1250 [Urine:1250]  Last three shifts: 02/24 1901 - 02/26 0700  In: 1540.3 [I.V.:1540.3]  Out: 2275 [Urine:2275]    Physical Exam:   Lethargic    Eyes:   Lethargic   Ears:  Normal TMs and external ear canals both ears. Nose: Nares normal. Septum midline. Mucosa normal. No drainage or sinus tenderness. Mouth/Throat: Lips, mucosa, and tongue normal. Teeth and gums normal.   Neck: Supple, symmetrical, trachea midline, no adenopathy, thyroid: no enlargment/tenderness/nodules, no carotid bruit and no JVD. Back:   Symmetric, no curvature. ROM normal. No CVA tenderness. Lungs:   Clear to auscultation bilaterally. Heart:  Regular rate and rhythm, S1, S2 normal, no murmur, click, rub or gallop. Abdomen:   Soft, non-tender. Bowel sounds normal. No masses,  No organomegaly. Extremities: lethargic atraumatic, no cyanosis or edema. Pulses: 2+ and symmetric all extremities. Skin: Skin color, texture, turgor normal. No rashes or lesions   Lymph nodes: Cervical, supraclavicular, and axillary nodes normal.   Neurologic: lethargic       Lab/Data Review: All lab results for the last 24 hours reviewed.      Recent Results (from the past 24 hour(s))   GLUCOSE, POC    Collection Time: 02/25/21 11:32 PM   Result Value Ref Range    Glucose (POC) 290 (H) 65 - 100 mg/dL    Performed by Louie Castaneda, RANDOM    Collection Time: 02/26/21  4:50 AM   Result Value Ref Range    Vancomycin, random 15.5 ug/mL    Reported dose date 0      Reported dose: 0 Units   CREATININE    Collection Time: 02/26/21  4:50 AM   Result Value Ref Range    Creatinine 2.50 (H) 0.70 - 1.30 mg/dL   GLUCOSE, POC    Collection Time: 02/26/21  6:19 AM   Result Value Ref Range    Glucose (POC) 323 (H) 65 - 100 mg/dL    Performed by Ryan Cavazos    GLUCOSE, POC    Collection Time: 02/26/21 11:20 AM   Result Value Ref Range    Glucose (POC) 346 (H) 65 - 100 mg/dL    Performed by [address] [place] [address] Avenue, POC    Collection Time: 02/26/21  5:39 PM   Result Value Ref Range    Glucose (POC) 279 (H) 65 - 100 mg/dL    Performed by Joi Hare          Assessment:     Active Problems:    Pneumonia (2/21/2021)      Renal failure (2/21/2021)      Sepsis ([address] (2/21/2021)    Aspiration pneumonia  Septic shock  Acute kidney injury  uti  DKA improved  covid 19 pneumonitis    Plan:     Gi for peg tube placement      Continue present treatment  D/w daughter  Signed By: Edelmira Garrison MD     February 26, 2021

## 2021-02-26 NOTE — PROGRESS NOTES
Infectious Disease Progress Note           Subjective:   Pt seen and examined at bedside. Decreased responsiveness, no acute events since last seen   Objective:   Physical Exam:     Visit Vitals  BP (!) 117/55 (BP 1 Location: Left upper arm, BP Patient Position: At rest)   Pulse 74   Temp 97.4 °F (36.3 °C)   Resp (!) 31   Ht 6' (1.829 m)   Wt 164 lb 3.9 oz (74.5 kg)   SpO2 96%   BMI 22.28 kg/m²    O2 Flow Rate (L/min): 1 l/min(placed patient on room air) O2 Device: Room air    Temp (24hrs), Av.5 °F (36.4 °C), Min:96.2 °F (35.7 °C), Max:98.5 °F (36.9 °C)    No intake/output data recorded.  1901 -  0700  In: 1540.3 [I.V.:1540.3]  Out: 3303 [Urine:2275]    General: NAD, generalized weakness   HEENT: JUAN CARLOS, Moist mucosa   Lungs: Decreased at the bases, no wheeze/rhonchi  Heart: S1S2+, RRR, no murmur  Abdo: Soft, NT, ND, +BS   : + indwelling benjamin cath   Exts: Trace edema, + pulses b/l   Skin: No wounds, No rashes or lesions    Data Review:       Recent Days:  Recent Labs     21  0430 21  0300   WBC 9.3 12.7*   HGB 11.1* 11.3*   HCT 33.4* 34.6*   * 151     Recent Labs     21  0450 21  0430 21  0330   BUN  --  120* 136*   CREA 2.50* 3.88* 4.38*       Lab Results   Component Value Date/Time    C-Reactive protein 4.33 (H) 2021 03:30 AM        Microbiology   - Urine Cx : K. Pneumoniae and E.faecalis  - Blood Cx : Coag neg staff     Diagnostics   CXR Results  (Last 48 hours)               21 0410  XR CHEST PORT Final result    Narrative:  Chest single view. Comparison single view chest 2021       Right PICC. NG tube has been removed. Unchanged appearance for the lungs with hazy reticular   markings, mid and lower lung predominance. Cardiac and mediastinal structures   unchanged noting thoracic aorta atherosclerosis. No pneumothorax or sizable   pleural effusion.        21 1621  XR CHEST PORT Final result Impression:  Gastric tube unchanged from the prior exam and should be advanced 8   cm for optimal positioning. Persistent interstitial infiltrates, unchanged. Please see full report. I called the CCU with this result and recommendation at   4:31 PM. I spoke with Albert Chavez. She states the NG tube appears to be at its   distal aspect outside the patient. I suspect it is coiled within the throat as   the NG tube position appears unchanged from the earlier exam.       Narrative:  Comparison is with the chest x-ray of earlier the same day at 12:25 PM. The   current study is timed 1614 hours. History is NG tube placement. The NG tube side port remains in the distal esophagus and should be inserted at   least 8 cm for optimal positioning. There is a right upper extremity PICC line   with the tip overlying the superior vena cava. Cardiac monitor leads are   present. The heart is mildly enlarged but this is a portable film. The aorta is   calcified and mildly tortuous. There is patchy interstitial infiltration of the   left mid to lower lung zone and medial right lower lobe, unchanged from the   earlier exam. There is no effusion or pneumothorax. Calcified granulomas are   noted. There is mild degenerative change of the thoracic spine. Assessment/Plan     1. Coag neg staph bacteremia in the setting of right arm PICC line      Isolated from 1/4 BCx btles collected in the ED, repeat on 02/23 is neg       Continue on Vanc Day # 4/7. Routine labs in the morning      2. UTI, abnormal UA, K. Pneumoniae and E.faecalis isolated from Cx.      + benjamin cath. On day # 6 of Zosyn     3. Acute renal failure: Cr continues to improve     4. Suspected aspiration PNA: new LLL infiltrate on admission CXR       Low suspicion for COVID-19, sample collected today (02/25). On RA.         5. Dysphagia: Being evaluated by GI for NGT placement     Eleno Johnson MD    2/26/2021

## 2021-02-26 NOTE — PROGRESS NOTES
Patient receiving line placement and not appropriate for swallow tx at this time s/t decreased responsiveness. Per nsg, on 2/25 plans for PPN.

## 2021-02-26 NOTE — PROGRESS NOTES
1020hrs- Dr. Bob maldonado d/t patient has 1-2+ pitting edema lane lower extremities. Awaiting call back. 1113hrs-Dr. Bob maldonado in regards to patient's arm is cold and edematous. 1538hrs-Dr. Tom Mcnulty called, no answer awaiting call back. Family would like to speak with GI in regards to tube placement.

## 2021-02-26 NOTE — PROGRESS NOTES
Pulmonary Progress Note      NAME: Horacio Rizo   :  1934  MRM:  [id number]    Date/Time: 2021  11:56 AM         Subjective:     Patient seen and examined in the ICU. Discussed with the RN. He is now on room air. He remains confused and obtunded. Speech evaluated him. Alternative means of nutrition was recommended. Unable to pass NG tube. GI is consulted for possible PEG tube. He is to be started on PPN. Nephrology on board as well. Started on diltiazem infusion. He has a Gonzales catheter and Flexi-Seal.    Past Medical History reviewed and unchanged from Admission History and Physical       Objective:     Physical Exam     Vitals:      Last 24hrs VS reviewed since prior progress note. Most recent are:    Visit Vitals  BP (!) 117/55 (BP 1 Location: Left upper arm, BP Patient Position: At rest)   Pulse 74   Temp 97.4 °F (36.3 °C)   Resp (!) 31   Ht 6' (1.829 m)   Wt 74.5 kg (164 lb 3.9 oz)   SpO2 96%   BMI 22.28 kg/m²     SpO2 Readings from Last 6 Encounters:   21 96%   21 96%    O2 Flow Rate (L/min): 1 l/min(placed patient on room air)       Intake/Output Summary (Last 24 hours) at 2021 1208  Last data filed at 2021 2505  Gross per 24 hour   Intake 1540.25 ml   Output 1650 ml   Net -109.75 ml      GENERAL:  The patient is an elderly white male who is somnolent. On room air. No distress. HEENT:  Shows pupils are equal and reactive to light. No pallor or icterus. Nasal passages are apparently patent. Oropharynx is difficult to evaluate. He will not cooperate with opening his mouth. NECK:  Supple. Trachea is central.  No JVD or lymphadenopathy. He is not using any accessory muscles of respirations. CHEST:  Symmetrical with equal and fair air entry bilaterally. However, he does not take a good inspiratory effort. Difficult to auscultate properly, scattered rhonchi are noted. HEART:  Rhythm is irregular with monitor showing sinus rhythm.   No loud murmur or gallop. ABDOMEN:  Soft and appears to be benign. Bowel sounds are audible. He has a Gonzales catheter and a Flexi-Seal in place. EXTREMITIES:  Do not show any cyanosis or clubbing. Trace pitting edema. Pulses are palpable. However he has more edema of the right upper extremity. NEUROLOGIC:  Shows that the patient is moving his extremities but appears to be confused. He has mittens in place. SKIN:  Warm and dry. XR CHEST PORT   Final Result      XR CHEST PORT   Final Result   Gastric tube unchanged from the prior exam and should be advanced 8   cm for optimal positioning. Persistent interstitial infiltrates, unchanged. Please see full report. I called the CCU with this result and recommendation at   4:31 PM. I spoke with Renae Tadeo. She states the NG tube appears to be at its   distal aspect outside the patient. I suspect it is coiled within the throat as   the NG tube position appears unchanged from the earlier exam.      XR CHEST PORT   Final Result   Feeding tube tip at the GE junction. This should be advanced into   the stomach. Airspace opacities in the lungs, left greater than right, not   significantly changed. XR CHEST PORT   Final Result      US RETROPERITONEUM COMP   Final Result   Small right renal cyst. Mild left hydronephrosis of unclear   etiology. Gonzales catheter in the bladder. Please see full report. XR CHEST PORT   Final Result   New left lung opacities, nonspecific, but could represent an infectious process   in the appropriate clinical setting. Asymmetric edema could appear similar,   though is considered less likely.              Lab Data Reviewed: (see below)      Medications:  Current Facility-Administered Medications   Medication Dose Route Frequency    vancomycin (VANCOCIN) 1,000 mg in 0.9% sodium chloride 250 mL (VIAL-MATE)  1,000 mg IntraVENous ONCE    dextrose 10% infusion  40 mL/hr IntraVENous CONTINUOUS    insulin lispro (HUMALOG) injection   SubCUTAneous Q6H  dexamethasone (DECADRON) 4 mg/mL injection 4 mg  4 mg IntraVENous Q12H    dilTIAZem (CARDIZEM) 125 mg/125 mL (1 mg/mL) dextrose 5% infusion  0-15 mg/hr IntraVENous TITRATE    dextrose 5% infusion  150 mL/hr IntraVENous CONTINUOUS    dextrose (D50W) injection syrg 12.5 g  25 mL IntraVENous PRN    NOREPINephrine (LEVOPHED) 8 mg in 5% dextrose 250mL (32 mcg/mL) infusion  0.5-16 mcg/min IntraVENous TITRATE    famotidine (PF) (PEPCID) 20 mg in 0.9% sodium chloride 10 mL injection  20 mg IntraVENous DAILY    Vancomycin dose per pharmacy protocol   Other Rx Dosing/Monitoring    midodrine (PROAMATINE) tablet 5 mg  5 mg Oral TID PRN    sodium chloride (NS) flush 5-40 mL  5-40 mL IntraVENous Q8H    sodium chloride (NS) flush 5-40 mL  5-40 mL IntraVENous PRN    azithromycin (ZITHROMAX) 500 mg in 0.9% sodium chloride 250 mL (VIAL-MATE)  500 mg IntraVENous DAILY    heparin (porcine) injection 5,000 Units  5,000 Units SubCUTAneous Q12H    glucose chewable tablet 16 g  4 Tab Oral PRN    dextrose (D50W) injection syrg 12.5-25 g  25-50 mL IntraVENous PRN    glucagon (GLUCAGEN) injection 1 mg  1 mg IntraMUSCular PRN    piperacillin-tazobactam (ZOSYN) 3.375 g in 0.9% sodium chloride (MBP/ADV) 100 mL MBP  3.375 g IntraVENous Q12H       ______________________________________________________________________      Lab Review:     Recent Labs     02/25/21  0430 02/24/21  0300   WBC 9.3 12.7*   HGB 11.1* 11.3*   HCT 33.4* 34.6*   * 151     Recent Labs     02/26/21  0450 02/25/21  0430 02/24/21  0330   NA  --  150* 158*   K  --  3.5 3.5   CL  --  123* 132*   CO2  --  20* 19*   GLU  --  367* 82   BUN  --  120* 136*   CREA 2.50* 3.88* 4.38*   CA  --  7.8* 8.2*   PHOS  --  3.2 3.3   ALB  --  1.8* 1.9*     No components found for: GLPOC  No results for input(s): PH, PCO2, PO2, HCO3, FIO2 in the last 72 hours. No results for input(s): INR, INREXT, INREXT, INREXT in the last 72 hours.     Other pertinent lab: Assessment & Plan:        1. Severe sepsis:  He was weaned off Levophed 2/22. Etiology appears to be multifactorial.  Probably urinary tract infection. However, he does appear to have aspiration pneumonia. Additionally, Line infection cannot be ruled out. He has right upper extremity PICC (peripherally inserted central catheter) line in place. Infectious Disease is also on the case. Urine cultures are showing gram-negative rods, Klebsiella pneumonia identified. He is empirically started on Zosyn, vancomycin and azithromycin which will be continued. Further adjustment per ID. 2.  Altered mental status: This appears to be secondary to underlying sepsis. He is at risk for aspiration. Speech has evaluated the patient. Recommending alternative means of nutrition. NG tube could not be passed. GI is consulted for possible PEG tube placement. He is to be started on PPN. It is not unreasonable to consider PEG tube in this elderly man with dementia and aspiration pneumonia. 3.  Hypoxic respiratory failure:  He has mainly left-sided infiltrate. He is now on room air. He is on droplet precautions because of history of COVID-19 infection. We will avoid nebulizations at this time. Chest x-ray done 2/23 shows no significant change. Repeat chest x-ray shows no significant change in predominantly basilar interstitial infiltrates. 4.  Acute renal failure:  Creatinine has improved to 2.5, was 5.04. Nephrology has been consulted as well. 5.  Atrial fibrillation:   Cardiology is also consulted as well. On diltiazem infusion. 6.  Multiple other medical problems including diabetes mellitus, hypertension, hypothyroidism, polycythemia vera, spinal stenosis and a history of transient ischemic attack. 7.  For deep venous thrombosis prophylaxis, he is started on heparin subcutaneously.   8.  For gastrointestinal stress prophylaxis, on IV Pepcid.     Thank you for allowing me to participate in the care of this patient. I will follow the patient closely with you. The patient is apparently a full code. He is also on dexamethasone which can be continued at this time. However decreasing the dose. Prognosis is guarded. Discussed with the nursing team in the ICU.   More than 30 minutes spent with patient care.  Shimon Cottrell MD

## 2021-02-26 NOTE — PROGRESS NOTES
OT eval order received and acknowledged. OT eval attempted at 11:59 am however pt currently undergoing ultrasound in room. Will continue to follow patient and attempt OT eval at a later time. Thank you.

## 2021-02-26 NOTE — PROGRESS NOTES
Comprehensive Nutrition Assessment    Type and Reason for Visit: Reassess(interim)    Nutrition Recommendations/Plan:  Continue PPN (4.25%AA,5%D), increasing to 83mL/hr  Provide 250mL 20% lipids daily  Goal PPN provides 1178kcal, 85g protein (50%kcal, 86%protein needs)    If/when G placed and available for use, initaite TF Glucerna 1.5 at 30mL/hr  Increase by 20mL q4hr to goal rate 60mL/hr, continuous  Flush with 150mL free water q4hr  Providing 2160kcal, 119g protein, 1993mL fluid (~100% est needs)    Document TF rate, water flushes, and GRVs in EMR    Nutrition Assessment:  Unresponsive pta. COVID+. Dx sepsis. Admitted to floor, transferred to ICU same night d/t concern for hypotension. Pt on pressor x1 day. Worsening R arm edema- assessing for DVT. Remains altered, obtunded. SLP attempted eval x2, rec'd NPO with alternate means of nutrition. RD provided TF recs, however Multiple attempts at NG placement unsuccessful. D5 initiated, GI consulted for PEG placement. PPN initiated (2/25). GI consulted for possible PEG, no further interventions noted. Pt remains on PPN (4.25%AA, 5%AA) with lipids daily- noted no lipids ordered since last week. RD to update recs. Labs: Na 145, BUN 53, Cr 1.67, , BG , K 3.3, Mg 1.4. Meds: Dexamethasone, azithromycin, D5 125mL/hr, heparin, zosyn, furosemide, insulin, KCl. Malnutrition Assessment:  Malnutrition Status:  Mild malnutrition    Context:  Acute illness       Estimated Daily Nutrient Needs:  Energy (kcal): 2125kcal (28kcal/kg); Weight Used for Energy Requirements: Current  Protein (g): 99g (1.3g/kg); Weight Used for Protein Requirements: Current  Fluid (ml/day): 2125mL; Method Used for Fluid Requirements: 1 ml/kcal    Nutrition Related Findings:  Unable to perform NFPE 2/2 COVID precautions. Pt appears nourished on visual assessment. No documented hx dysphagia, n/v, or c/d. FMS (2/22) with liquid OP daily- 550mL OP x24 hours.  SLP following for dysphagia- appears to be 2/2 mentation vs physical.        Wounds:    None       Current Nutrition Therapies:  DIET NPO  DIET TUBE FEEDING Glucerna 1.5 Goal 60mL/hr.  amino acid 4.25 % dextrose 5 % with electrolytes (CLINIMIX E) infusion    Anthropometric Measures:  · Height:  6' (182.9 cm)  · Current Body Wt:  76 kg (167 lb 8.8 oz)(2/23)    · Ideal Body Wt:  178 lbs:  94.1 %     · BMI Category:  Normal weight (BMI 22.0-24.9) age over 72       Nutrition Diagnosis:   · Inadequate oral intake related to cognitive or neurological impairment as evidenced by NPO or clear liquid status due to medical condition      Nutrition Interventions:   Food and/or Nutrient Delivery: Continue NPO, Continue parenteral nutrition  Nutrition Education and Counseling: No recommendations at this time  Coordination of Nutrition Care: Continue to monitor while inpatient    Goals:  Meet >75% EENs via PO, Wt maintenance +/- 0.5kg per week, Lytes wnl       Nutrition Monitoring and Evaluation:   Behavioral-Environmental Outcomes: None identified  Food/Nutrient Intake Outcomes: Enteral nutrition intake/tolerance  Physical Signs/Symptoms Outcomes: Weight, Biochemical data    Discharge Planning:     Too soon to determine     Electronically signed by Samantha Baird on 3/1/2021 at 4:02 PM    Contact:

## 2021-02-27 ENCOUNTER — APPOINTMENT (OUTPATIENT)
Dept: GENERAL RADIOLOGY | Age: 86
DRG: 871 | End: 2021-02-27
Attending: INTERNAL MEDICINE
Payer: MEDICARE

## 2021-02-27 LAB
ALBUMIN SERPL-MCNC: 1.8 G/DL (ref 3.5–5)
ALBUMIN/GLOB SERPL: 0.5 {RATIO} (ref 1.1–2.2)
ALP SERPL-CCNC: 115 U/L (ref 45–117)
ALT SERPL-CCNC: 121 U/L (ref 12–78)
ANION GAP SERPL CALC-SCNC: 7 MMOL/L (ref 5–15)
AST SERPL W P-5'-P-CCNC: 45 U/L (ref 15–37)
BILIRUB SERPL-MCNC: 0.4 MG/DL (ref 0.2–1)
BUN SERPL-MCNC: 59 MG/DL (ref 6–20)
BUN/CREAT SERPL: 30 (ref 12–20)
CA-I BLD-MCNC: 8 MG/DL (ref 8.5–10.1)
CHLORIDE SERPL-SCNC: 118 MMOL/L (ref 97–108)
CO2 SERPL-SCNC: 22 MMOL/L (ref 21–32)
CREAT SERPL-MCNC: 1.94 MG/DL (ref 0.7–1.3)
GLOBULIN SER CALC-MCNC: 3.8 G/DL (ref 2–4)
GLUCOSE BLD STRIP.AUTO-MCNC: 256 MG/DL (ref 65–100)
GLUCOSE BLD STRIP.AUTO-MCNC: 283 MG/DL (ref 65–100)
GLUCOSE BLD STRIP.AUTO-MCNC: 328 MG/DL (ref 65–100)
GLUCOSE SERPL-MCNC: 222 MG/DL (ref 65–100)
MAGNESIUM SERPL-MCNC: 1.4 MG/DL (ref 1.6–2.4)
PERFORMED BY, TECHID: ABNORMAL
POTASSIUM SERPL-SCNC: 3.4 MMOL/L (ref 3.5–5.1)
PROT SERPL-MCNC: 5.6 G/DL (ref 6.4–8.2)
SODIUM SERPL-SCNC: 147 MMOL/L (ref 136–145)
TROPONIN I SERPL-MCNC: <0.05 NG/ML
TROPONIN I SERPL-MCNC: <0.05 NG/ML
VANCOMYCIN SERPL-MCNC: 13.2 UG/ML

## 2021-02-27 PROCEDURE — 74011250636 HC RX REV CODE- 250/636: Performed by: INTERNAL MEDICINE

## 2021-02-27 PROCEDURE — 82962 GLUCOSE BLOOD TEST: CPT

## 2021-02-27 PROCEDURE — 74011000250 HC RX REV CODE- 250: Performed by: FAMILY MEDICINE

## 2021-02-27 PROCEDURE — 74011000258 HC RX REV CODE- 258: Performed by: INTERNAL MEDICINE

## 2021-02-27 PROCEDURE — 80053 COMPREHEN METABOLIC PANEL: CPT

## 2021-02-27 PROCEDURE — 71045 X-RAY EXAM CHEST 1 VIEW: CPT

## 2021-02-27 PROCEDURE — 65610000006 HC RM INTENSIVE CARE

## 2021-02-27 PROCEDURE — 36415 COLL VENOUS BLD VENIPUNCTURE: CPT

## 2021-02-27 PROCEDURE — 83735 ASSAY OF MAGNESIUM: CPT

## 2021-02-27 PROCEDURE — 74011250636 HC RX REV CODE- 250/636: Performed by: LEGAL MEDICINE

## 2021-02-27 PROCEDURE — 74011636637 HC RX REV CODE- 636/637: Performed by: INTERNAL MEDICINE

## 2021-02-27 PROCEDURE — 74011000258 HC RX REV CODE- 258: Performed by: FAMILY MEDICINE

## 2021-02-27 PROCEDURE — 74011000250 HC RX REV CODE- 250: Performed by: LEGAL MEDICINE

## 2021-02-27 PROCEDURE — 80202 ASSAY OF VANCOMYCIN: CPT

## 2021-02-27 PROCEDURE — 74011000250 HC RX REV CODE- 250: Performed by: INTERNAL MEDICINE

## 2021-02-27 PROCEDURE — 84484 ASSAY OF TROPONIN QUANT: CPT

## 2021-02-27 RX ORDER — HYDROXYZINE 50 MG/ML
25 INJECTION, SOLUTION INTRAMUSCULAR
Status: DISCONTINUED | OUTPATIENT
Start: 2021-02-27 | End: 2021-03-03 | Stop reason: HOSPADM

## 2021-02-27 RX ORDER — DEXTROSE MONOHYDRATE 50 MG/ML
50 INJECTION, SOLUTION INTRAVENOUS CONTINUOUS
Status: DISCONTINUED | OUTPATIENT
Start: 2021-02-27 | End: 2021-02-27 | Stop reason: SDUPTHER

## 2021-02-27 RX ADMIN — INSULIN LISPRO 8 UNITS: 100 INJECTION, SOLUTION INTRAVENOUS; SUBCUTANEOUS at 18:39

## 2021-02-27 RX ADMIN — TRACE ELEMENTS INJECTION 4: 7.4; .75; 98; 151 INJECTION, SOLUTION INTRAVENOUS at 22:07

## 2021-02-27 RX ADMIN — HEPARIN SODIUM 5000 UNITS: 5000 INJECTION INTRAVENOUS; SUBCUTANEOUS at 04:22

## 2021-02-27 RX ADMIN — VANCOMYCIN HYDROCHLORIDE 1000 MG: 1 INJECTION, POWDER, LYOPHILIZED, FOR SOLUTION INTRAVENOUS at 19:00

## 2021-02-27 RX ADMIN — HEPARIN SODIUM 5000 UNITS: 5000 INJECTION INTRAVENOUS; SUBCUTANEOUS at 15:44

## 2021-02-27 RX ADMIN — INSULIN LISPRO 6 UNITS: 100 INJECTION, SOLUTION INTRAVENOUS; SUBCUTANEOUS at 06:00

## 2021-02-27 RX ADMIN — Medication 20 ML: at 14:00

## 2021-02-27 RX ADMIN — DEXAMETHASONE SODIUM PHOSPHATE 4 MG: 4 INJECTION, SOLUTION INTRA-ARTICULAR; INTRALESIONAL; INTRAMUSCULAR; INTRAVENOUS; SOFT TISSUE at 22:10

## 2021-02-27 RX ADMIN — DEXTROSE MONOHYDRATE 150 ML/HR: 50 INJECTION, SOLUTION INTRAVENOUS at 06:52

## 2021-02-27 RX ADMIN — I.V. FAT EMULSION 250 ML: 20 EMULSION INTRAVENOUS at 22:09

## 2021-02-27 RX ADMIN — PIPERACILLIN AND TAZOBACTAM 3.38 G: 3; .375 INJECTION, POWDER, LYOPHILIZED, FOR SOLUTION INTRAVENOUS at 13:02

## 2021-02-27 RX ADMIN — AZITHROMYCIN DIHYDRATE 500 MG: 500 INJECTION, POWDER, LYOPHILIZED, FOR SOLUTION INTRAVENOUS at 08:57

## 2021-02-27 RX ADMIN — HYDROXYZINE HYDROCHLORIDE 25 MG: 50 INJECTION, SOLUTION INTRAMUSCULAR at 13:06

## 2021-02-27 RX ADMIN — POTASSIUM CHLORIDE: 149 INJECTION, SOLUTION, CONCENTRATE INTRAVENOUS at 17:29

## 2021-02-27 RX ADMIN — INSULIN LISPRO 6 UNITS: 100 INJECTION, SOLUTION INTRAVENOUS; SUBCUTANEOUS at 12:58

## 2021-02-27 RX ADMIN — Medication 5 MG/HR: at 15:42

## 2021-02-27 RX ADMIN — DEXTROSE MONOHYDRATE 150 ML/HR: 50 INJECTION, SOLUTION INTRAVENOUS at 12:59

## 2021-02-27 RX ADMIN — PIPERACILLIN AND TAZOBACTAM 3.38 G: 3; .375 INJECTION, POWDER, LYOPHILIZED, FOR SOLUTION INTRAVENOUS at 22:10

## 2021-02-27 RX ADMIN — DEXAMETHASONE SODIUM PHOSPHATE 4 MG: 4 INJECTION, SOLUTION INTRA-ARTICULAR; INTRALESIONAL; INTRAMUSCULAR; INTRAVENOUS; SOFT TISSUE at 08:55

## 2021-02-27 RX ADMIN — FAMOTIDINE 20 MG: 10 INJECTION INTRAVENOUS at 08:55

## 2021-02-27 RX ADMIN — Medication 10 ML: at 22:00

## 2021-02-27 NOTE — PROGRESS NOTES
Progress Note      2/27/2021 10:30 AM  NAME: Juhi Nance   MRN:  [id number]   Admit Diagnosis: Sepsis ([address] [A41.9]  Pneumonia [J18.9]  Renal failure [N19]      Problem List:   Cardiology cross cover progress note-[facility] (Matthew arreola MD)    1. Incomplete database secondary to the patient being obtunded/altered mental status  2. COVID-19 disease  3. Covid pneumonitis  4. Aspiration pneumonia  5. Urinary tract infection  6. Severe sepsis  7. Septic shock initially requiring inotropic support  8. Poor nutrition  9. Consideration for percutaneous endoscopic gastrostomy  (PEG) tube placement  10. Previous transient ischemic attack (TIA)    11. Cardiac dysrhythmia        11a. Atrial fibrillation        11b. Nonsustained ventricular tachycardia (NSVT)  12. Hypertension  13. Dyslipidemia  14. Hypothyroidism  15. Acute kidney injury  16. Spinal stenosis  17. Polycythemia vera  18. Thrombocytopenia       Subjective: The patient is seen and examined in room 280. There were no acute cardiovascular events reported overnight. The patient remains nonverbal.  Earlier today, the patient was noted to have an episode of nonsustained ventricular tachycardia.     Medications Personally Reviewed:    Current Facility-Administered Medications   Medication Dose Route Frequency    hydrOXYzine (VISTARIL) injection 25 mg  25 mg IntraMUSCular Q6H PRN    amino acid 4.25 % dextrose 5 % with electrolytes (CLINIMIX E) infusion   IntraVENous CONTINUOUS    fat emulsion 20% (LIPOSYN, INTRAlipid) infusion 250 mL  250 mL IntraVENous CONTINUOUS    insulin lispro (HUMALOG) injection   SubCUTAneous Q6H    dexamethasone (DECADRON) 4 mg/mL injection 4 mg  4 mg IntraVENous Q12H    dilTIAZem (CARDIZEM) 125 mg/125 mL (1 mg/mL) dextrose 5% infusion  0-15 mg/hr IntraVENous TITRATE    dextrose 5% infusion  150 mL/hr IntraVENous CONTINUOUS    dextrose (D50W) injection syrg 12.5 g  25 mL IntraVENous PRN    NOREPINephrine (LEVOPHED) 8 mg in 5% dextrose 250mL (32 mcg/mL) infusion  0.5-16 mcg/min IntraVENous TITRATE    famotidine (PF) (PEPCID) 20 mg in 0.9% sodium chloride 10 mL injection  20 mg IntraVENous DAILY    Vancomycin dose per pharmacy protocol   Other Rx Dosing/Monitoring    midodrine (PROAMATINE) tablet 5 mg  5 mg Oral TID PRN    sodium chloride (NS) flush 5-40 mL  5-40 mL IntraVENous Q8H    sodium chloride (NS) flush 5-40 mL  5-40 mL IntraVENous PRN    azithromycin (ZITHROMAX) 500 mg in 0.9% sodium chloride 250 mL (VIAL-MATE)  500 mg IntraVENous DAILY    heparin (porcine) injection 5,000 Units  5,000 Units SubCUTAneous Q12H    glucose chewable tablet 16 g  4 Tab Oral PRN    dextrose (D50W) injection syrg 12.5-25 g  25-50 mL IntraVENous PRN    glucagon (GLUCAGEN) injection 1 mg  1 mg IntraMUSCular PRN    piperacillin-tazobactam (ZOSYN) 3.375 g in 0.9% sodium chloride (MBP/ADV) 100 mL MBP  3.375 g IntraVENous Q12H           Objective:      Physical Exam:  Last 24hrs VS reviewed since prior progress note. Most recent are:    Visit Vitals  BP (!) 144/79 (BP 1 Location: Left lower arm, BP Patient Position: At rest)   Pulse 74   Temp 97.8 °F (36.6 °C)   Resp 28   Ht 6' (1.829 m)   Wt 73.5 kg (162 lb)   SpO2 95%   BMI 21.97 kg/m²       Intake/Output Summary (Last 24 hours) at 2/27/2021 1030  Last data filed at 2/27/2021 0908  Gross per 24 hour   Intake    Output 1350 ml   Net -1350 ml        General Appearance: Well developed, in no acute respiratory distress. Chest: Lungs clear to auscultation bilaterally. Cardiovascular: JVP is not elevated, PMI is palpable, normal intensity S1 and S2, without S3. Abdomen: Soft, non-tender, bowel sounds are active. Extremities: No edema bilaterally.     Data Review    Telemetry: Sinus rhythm without ventricular ectopy    EKG:   []  No new EKG for review    Lab Data Personally Reviewed:    Recent Labs     02/25/21  0430   WBC 9.3   HGB 11.1*   HCT 33.4*   PLT 126*     No results for input(s): INR, PTP, APTT, INREXT in the last 72 hours. Recent Labs     02/27/21 0440 02/26/21 0450 02/25/21 0430   *  --  150*   K 3.4*  --  3.5   *  --  123*   CO2 22  --  20*   BUN 59*  --  120*   CREA 1.94* 2.50* 3.88*   *  --  367*   CA 8.0*  --  7.8*     No results for input(s): CPK, CKNDX, TROIQ in the last 72 hours. No lab exists for component: CPKMB  Lab Results   Component Value Date/Time    Cholesterol, total 185 02/05/2021 01:37 PM    HDL Cholesterol 40 02/05/2021 01:37 PM    LDL, calculated 126.2 (H) 02/05/2021 01:37 PM    Triglyceride 94 02/05/2021 01:37 PM    CHOL/HDL Ratio 4.6 02/05/2021 01:37 PM       Recent Labs     02/27/21 0440 02/25/21 0430     --    TP 5.6*  --    ALB 1.8* 1.8*   GLOB 3.8  --      No results for input(s): PH, PCO2, PO2 in the last 72 hours. Assessment/Plan:   1. Continue ICU care  2. Obtain serial cardiac enzymes  3. Check serum electrolytes, especially magnesium, and renal function  4. Continue current cardiovascular medications including heparin, and insulin  5.   Further recommendations depending on above results, and clinical course     Ely Hernandez MD

## 2021-02-27 NOTE — PROGRESS NOTES
Consult for Vancomycin Dosing by Pharmacy by Dr. Adama Chavez provided for this 80y.o. year old male , for indication of sepsis. Day of Therapy: 6  Goal of Level(s): 15-20mcg/dL    Other Current Antibiotics: Zosyn    Significant Cultures:       Date                             Culture                                       Organism      2/21                              Blood x2                                    GPC      2/21                              Urine                                          Klebsiella, Enterococcus faecalis    Serum Creatinine Creatinine   Date Value Ref Range Status   02/27/2021 1.94 (H) 0.70 - 1.30 mg/dL Final     Comment:     DELTA   02/26/2021 2.50 (H) 0.70 - 1.30 mg/dL Final   02/25/2021 3.88 (H) 0.70 - 1.30 mg/dL Final      Creatinine Clearance Estimated Creatinine Clearance: 28.4 mL/min (A) (based on SCr of 1.94 mg/dL (H)). BUN Lab Results   Component Value Date/Time    BUN 59 (H) 02/27/2021 04:40 AM      WBC Lab Results   Component Value Date/Time    WBC 9.3 02/25/2021 04:30 AM      Temp 97.7 °F (36.5 °C)     Last Level:    Vancomycin, random   Date Value Ref Range Status   02/27/2021 13.2 ug/mL Final     Comment:     No reference range has been established. Ht Readings from Last 1 Encounters:   02/24/21 182.9 cm (72\")        Wt Readings from Last 1 Encounters:   02/27/21 73.5 kg (162 lb)     Ideal body weight: 77.6 kg (171 lb 1.2 oz)     Previous Regimen: 1000mg IV x1 (2/26)    New Regimen:   Patient still has NATALI. Give Vancomycin 1000mg IV today. Pharmacy to follow daily and will make changes to dose and/or frequency based on clinical status.   _________________________________     Pharmacist Jennifer Ferreira

## 2021-02-27 NOTE — PROGRESS NOTES
Patient is not seen or examined  Dr. Yeni Mcfarland note-reviewed  Patient seems to be more tachypneic, however he is saturating well at 97%  Renal function continues to improve  Remains very poor  And output is about 600 mL  Calcium remains low at 3.4  Past Medical History:   Diagnosis Date    Atrial fibrillation ([facility] [address]     Diabetes mellitus type 2, uncomplicated ([facility] [address]     Hyperlipidemia     Hypertension     Hypothyroidism     Polycythemia vera ([facility] [address]     Prostatic hyperplasia     Spinal stenosis     Transient ischemic attack       Past Surgical History:   Procedure Laterality Date    HX OTHER SURGICAL      None     Family History   Problem Relation Age of Onset    Hypertension Mother     Stroke Mother     Lung Cancer Father     Melanoma Father       Social History     Tobacco Use    Smoking status: Unknown If Ever Smoked   Substance Use Topics    Alcohol use: Never     Frequency: Never     Binge frequency: Never       Current Facility-Administered Medications   Medication Dose Route Frequency Provider Last Rate Last Admin    hydrOXYzine (VISTARIL) injection 25 mg  25 mg IntraMUSCular Q6H PRN Dayne Aquino DO        amino acid 4.25 % dextrose 5 % with electrolytes (CLINIMIX E) infusion   IntraVENous CONTINUOUS MatulinTazve RPablo, NP 42 mL/hr at 02/26/21 2349 New Bag at 02/26/21 2349    fat emulsion 20% (LIPOSYN, INTRAlipid) infusion 250 mL  250 mL IntraVENous CONTINUOUS Matulin, Laura RPablo, NP 20.83 mL/hr at 02/26/21 2349 250 mL at 02/26/21 2349    insulin lispro (HUMALOG) injection   SubCUTAneous Q6H Kylie LENZ MD   6 Units at 02/27/21 0600    dexamethasone (DECADRON) 4 mg/mL injection 4 mg  4 mg IntraVENous Q12H Joselito Haile MD   4 mg at 02/27/21 0855    dilTIAZem (CARDIZEM) 125 mg/125 mL (1 mg/mL) dextrose 5% infusion  0-15 mg/hr IntraVENous TITRATE Matthew He MD 5 mL/hr at 02/26/21 1048 5 mg/hr at 02/26/21 1048    dextrose 5% infusion  150 mL/hr IntraVENous CONTINUOUS Antonio Karlos Pratt  mL/hr at 02/27/21 0652 150 mL/hr at 02/27/21 7622    dextrose (D50W) injection syrg 12.5 g  25 mL IntraVENous PRN Christie LENZ MD   12.5 g at 02/24/21 0957    NOREPINephrine (LEVOPHED) 8 mg in 5% dextrose 250mL (32 mcg/mL) infusion  0.5-16 mcg/min IntraVENous TITRATE Hermelinda Ruth MD   Stopped at 02/22/21 0800    famotidine (PF) (PEPCID) 20 mg in 0.9% sodium chloride 10 mL injection  20 mg IntraVENous DAILY Susana Montoya MD   20 mg at 02/27/21 0855    Vancomycin dose per pharmacy protocol   Other Rx Dosing/Monitoring Javier Molina MD        midodrine (PROAMATINE) tablet 5 mg  5 mg Oral TID PRN Javier Molina MD        sodium chloride (NS) flush 5-40 mL  5-40 mL IntraVENous Q8H Candelario Alvarez MD   10 mL at 02/26/21 1501    sodium chloride (NS) flush 5-40 mL  5-40 mL IntraVENous PRN Hermelinda Ruth MD        azithromycin (ZITHROMAX) 500 mg in 0.9% sodium chloride 250 mL (VIAL-MATE)  500 mg IntraVENous DAILY Hermelinda Ruth MD   500 mg at 02/27/21 0857    heparin (porcine) injection 5,000 Units  5,000 Units SubCUTAneous Q12H Hermelinda Ruth MD   5,000 Units at 02/27/21 0422    glucose chewable tablet 16 g  4 Tab Oral PRN Hermelinda Ruth MD        dextrose (D50W) injection syrg 12.5-25 g  25-50 mL IntraVENous PRN Hermelinda Ruth MD        glucagon (GLUCAGEN) injection 1 mg  1 mg IntraMUSCular PRN Hermelinda Ruth MD        piperacillin-tazobactam (ZOSYN) 3.375 g in 0.9% sodium chloride (MBP/ADV) 100 mL MBP  3.375 g IntraVENous Q12H Javier Molina  mL/hr at 02/26/21 2332 3.375 g at 02/26/21 2332          Objective     Vital signs for last 24 hours:  Visit Vitals  BP (!) 144/79 (BP 1 Location: Left lower arm, BP Patient Position: At rest)   Pulse 74   Temp 97.8 °F (36.6 °C)   Resp 28   Ht 6' (1.829 m)   Wt 73.5 kg (162 lb)   SpO2 95%   BMI 21.97 kg/m²       Intake/Output this shift:  Current Shift: 02/27 0701 - 02/27 1900  In: -   Out: 100 [Urine:100]  Last 3 Shifts: 02/25 1901 - 02/27 0700  In: -   Out: 2075 [Urine:2075]    Rectal tube in place with dark green liquid stool  Gonzales catheter with clear urine  Data Review:   Recent Results (from the past 24 hour(s))   GLUCOSE, POC    Collection Time: 02/26/21  5:39 PM   Result Value Ref Range    Glucose (POC) 279 (H) 65 - 100 mg/dL    Performed by Xu Mason, POC    Collection Time: 02/26/21 11:42 PM   Result Value Ref Range    Glucose (POC) 218 (H) 65 - 100 mg/dL    Performed by [facility] MELISSA    METABOLIC PANEL, COMPREHENSIVE    Collection Time: 02/27/21  4:40 AM   Result Value Ref Range    Sodium 147 (H) 136 - 145 mmol/L    Potassium 3.4 (L) 3.5 - 5.1 mmol/L    Chloride 118 (H) 97 - 108 mmol/L    CO2 22 21 - 32 mmol/L    Anion gap 7 5 - 15 mmol/L    Glucose 222 (H) 65 - 100 mg/dL    BUN 59 (H) 6 - 20 mg/dL    Creatinine 1.94 (H) 0.70 - 1.30 mg/dL    BUN/Creatinine ratio 30 (H) 12 - 20      GFR est AA 40 (L) >60 ml/min/1.73m2    GFR est non-AA 33 (L) >60 ml/min/1.73m2    Calcium 8.0 (L) 8.5 - 10.1 mg/dL    Bilirubin, total 0.4 0.2 - 1.0 mg/dL    AST (SGOT) 45 (H) 15 - 37 U/L    ALT (SGPT) 121 (H) 12 - 78 U/L    Alk. phosphatase 115 45 - 117 U/L    Protein, total 5.6 (L) 6.4 - 8.2 g/dL    Albumin 1.8 (L) 3.5 - 5.0 g/dL    Globulin 3.8 2.0 - 4.0 g/dL    A-G Ratio 0.5 (L) 1.1 - 2.2     GLUCOSE, POC    Collection Time: 02/27/21 11:48 AM   Result Value Ref Range    Glucose (POC) 256 (H) 65 - 100 mg/dL    Performed by Purnima Subramanian          XR CHEST PORT   Final Result      XR CHEST PORT   Final Result   Gastric tube unchanged from the prior exam and should be advanced 8   cm for optimal positioning. Persistent interstitial infiltrates, unchanged. Please see full report. I called the CCU with this result and recommendation at   4:31 PM. I spoke with John Hurley. She states the NG tube appears to be at its   distal aspect outside the patient.  I suspect it is coiled within the throat as the NG tube position appears unchanged from the earlier exam.      XR CHEST PORT   Final Result   Feeding tube tip at the GE junction. This should be advanced into   the stomach. Airspace opacities in the lungs, left greater than right, not   significantly changed. XR CHEST PORT   Final Result      US RETROPERITONEUM COMP   Final Result   Small right renal cyst. Mild left hydronephrosis of unclear   etiology. Gonzales catheter in the bladder. Please see full report. XR CHEST PORT   Final Result   New left lung opacities, nonspecific, but could represent an infectious process   in the appropriate clinical setting. Asymmetric edema could appear similar,   though is considered less likely. XR CHEST PORT    (Results Pending)        Patient Active Problem List   Diagnosis Code    Fall W19. XXXA    Weakness R53.1    Pneumonia J18.9    Renal failure N19    Sepsis ([facility] [address] A41.9        DIAGNOSES:  1. Acute kidney injury, grade 3 ( severe) with improvement in urine output       and labs, -serum creatinine has come down from 5.04 to 1.94 since February 23      continue present Rx  2. COVID-19 pneumonia-respiratory failure-followed by Dr. Hiedi Garcia  3. Mild  Hypernatremia-with decreased sodium from 1 57-1 47 from February 23 to  February 27  4. Hyperchloremic metabolic acidosisalmost resolved bicarbonate 22  5. Hypotension on pressors  6.  Encephalopathy      PLAN:  Change IV fluids to dextrose 5% 1000 mL with  1 amp of sodium bicarbonate and 30 meq  of KCl  to run at 75  ml/hr   Continue to monitor intakes and output  Daily labs

## 2021-02-27 NOTE — PROGRESS NOTES
OT order received, attempted to complete OT eval, with RN reporting that pt was not appropriate to work with due to [facility] this morning. Will continue to follow and evaluate when medically appropriate. Thank you.

## 2021-02-27 NOTE — PROGRESS NOTES
Pulmonary Progress Note      NAME: Louise Parker   :  1934  MRM:  [id number]    Date/Time: 2021  11:56 AM         Subjective:     Patient seen and examined in the ICU. Discussed with the RN. Today he is somewhat more tachypneic. Started on 2 L oxygen. No distress however. He is started on PPN and lipids. He remains confused and obtunded. Speech evaluated him. Alternative means of nutrition was recommended. Unable to pass NG tube. GI is consulted for possible PEG tube. Nephrology on board as well. Started on diltiazem infusion. Cardiology on board. Had a nonsustained VT. He has a Gonzales catheter and Flexi-Seal.    Past Medical History reviewed and unchanged from Admission History and Physical       Objective:     Physical Exam     Vitals:      Last 24hrs VS reviewed since prior progress note. Most recent are:    Visit Vitals  BP (!) 144/79 (BP 1 Location: Left lower arm, BP Patient Position: At rest)   Pulse 74   Temp 97.8 °F (36.6 °C)   Resp 28   Ht 6' (1.829 m)   Wt 73.5 kg (162 lb)   SpO2 95%   BMI 21.97 kg/m²     SpO2 Readings from Last 6 Encounters:   21 95%   21 96%    O2 Flow Rate (L/min): 1 l/min(placed patient on room air)       Intake/Output Summary (Last 24 hours) at 2021 1159  Last data filed at 2021 0908  Gross per 24 hour   Intake    Output 600 ml   Net -600 ml      GENERAL:  The patient is an elderly white male who is somnolent. No distress. HEENT:  Shows pupils are equal and reactive to light. No pallor or icterus. Nasal passages are apparently patent. Oropharynx is difficult to evaluate. He will not cooperate with opening his mouth. NECK:  Supple. Trachea is central.  No JVD or lymphadenopathy. He is not using any accessory muscles of respirations. CHEST:  Symmetrical with equal and fair air entry bilaterally. However, he does not take a good inspiratory effort. Difficult to auscultate properly, scattered rhonchi are noted.   HEART: Rhythm is irregular with monitor showing sinus rhythm. No loud murmur or gallop. ABDOMEN:  Soft and appears to be benign. Bowel sounds are audible. He has a Gonzales catheter and a Flexi-Seal in place. EXTREMITIES:  Do not show any cyanosis or clubbing.  +1 pitting edema. Pulses are palpable. However he has more edema of the right upper extremity. NEUROLOGIC:  Shows that the patient is moving his extremities but appears to be confused. He has mittens in place. SKIN:  Warm and dry. XR CHEST PORT   Final Result      XR CHEST PORT   Final Result   Gastric tube unchanged from the prior exam and should be advanced 8   cm for optimal positioning. Persistent interstitial infiltrates, unchanged. Please see full report. I called the CCU with this result and recommendation at   4:31 PM. I spoke with Merry Schlatter. She states the NG tube appears to be at its   distal aspect outside the patient. I suspect it is coiled within the throat as   the NG tube position appears unchanged from the earlier exam.      XR CHEST PORT   Final Result   Feeding tube tip at the GE junction. This should be advanced into   the stomach. Airspace opacities in the lungs, left greater than right, not   significantly changed. XR CHEST PORT   Final Result      US RETROPERITONEUM COMP   Final Result   Small right renal cyst. Mild left hydronephrosis of unclear   etiology. Gonzales catheter in the bladder. Please see full report. XR CHEST PORT   Final Result   New left lung opacities, nonspecific, but could represent an infectious process   in the appropriate clinical setting. Asymmetric edema could appear similar,   though is considered less likely.              Lab Data Reviewed: (see below)      Medications:  Current Facility-Administered Medications   Medication Dose Route Frequency    hydrOXYzine (VISTARIL) injection 25 mg  25 mg IntraMUSCular Q6H PRN    amino acid 4.25 % dextrose 5 % with electrolytes (CLINIMIX E) infusion IntraVENous CONTINUOUS    fat emulsion 20% (LIPOSYN, INTRAlipid) infusion 250 mL  250 mL IntraVENous CONTINUOUS    insulin lispro (HUMALOG) injection   SubCUTAneous Q6H    dexamethasone (DECADRON) 4 mg/mL injection 4 mg  4 mg IntraVENous Q12H    dilTIAZem (CARDIZEM) 125 mg/125 mL (1 mg/mL) dextrose 5% infusion  0-15 mg/hr IntraVENous TITRATE    dextrose 5% infusion  150 mL/hr IntraVENous CONTINUOUS    dextrose (D50W) injection syrg 12.5 g  25 mL IntraVENous PRN    NOREPINephrine (LEVOPHED) 8 mg in 5% dextrose 250mL (32 mcg/mL) infusion  0.5-16 mcg/min IntraVENous TITRATE    famotidine (PF) (PEPCID) 20 mg in 0.9% sodium chloride 10 mL injection  20 mg IntraVENous DAILY    Vancomycin dose per pharmacy protocol   Other Rx Dosing/Monitoring    midodrine (PROAMATINE) tablet 5 mg  5 mg Oral TID PRN    sodium chloride (NS) flush 5-40 mL  5-40 mL IntraVENous Q8H    sodium chloride (NS) flush 5-40 mL  5-40 mL IntraVENous PRN    azithromycin (ZITHROMAX) 500 mg in 0.9% sodium chloride 250 mL (VIAL-MATE)  500 mg IntraVENous DAILY    heparin (porcine) injection 5,000 Units  5,000 Units SubCUTAneous Q12H    glucose chewable tablet 16 g  4 Tab Oral PRN    dextrose (D50W) injection syrg 12.5-25 g  25-50 mL IntraVENous PRN    glucagon (GLUCAGEN) injection 1 mg  1 mg IntraMUSCular PRN    piperacillin-tazobactam (ZOSYN) 3.375 g in 0.9% sodium chloride (MBP/ADV) 100 mL MBP  3.375 g IntraVENous Q12H       ______________________________________________________________________      Lab Review:     Recent Labs     02/25/21  0430   WBC 9.3   HGB 11.1*   HCT 33.4*   *     Recent Labs     02/27/21  0440 02/26/21 0450 02/25/21  0430   *  --  150*   K 3.4*  --  3.5   *  --  123*   CO2 22  --  20*   *  --  367*   BUN 59*  --  120*   CREA 1.94* 2.50* 3.88*   CA 8.0*  --  7.8*   PHOS  --   --  3.2   ALB 1.8*  --  1.8*   *  --   --      No components found for: GLPOC  No results for input(s): PH, PCO2, PO2, HCO3, FIO2 in the last 72 hours. No results for input(s): INR, INREXT, INREXT, INREXT in the last 72 hours. Other pertinent lab:          Assessment & Plan:        1. Severe sepsis:  He was weaned off Levophed 2/22. Etiology appears to be multifactorial.  Probably urinary tract infection. However, he does appear to have aspiration pneumonia. Additionally, Line infection cannot be ruled out. He has right upper extremity PICC (peripherally inserted central catheter) line in place. Infectious Disease is also on the case. Urine cultures are showing gram-negative rods, Klebsiella pneumonia identified. He is empirically started on Zosyn, vancomycin and azithromycin which will be continued. Further adjustment per ID. 2.  Altered mental status: This appears to be secondary to underlying sepsis. He is at risk for aspiration. Speech has evaluated the patient. Recommending alternative means of nutrition. NG tube could not be passed. GI is consulted for possible PEG tube placement. He is started on PPN and lipids. It is not unreasonable to consider PEG tube in this elderly man with dementia and aspiration pneumonia. 3.  Hypoxic respiratory failure:  He has mainly left-sided infiltrate. He was on room air. However he is more tachypneic today and started on 2 L of oxygen. I will obtain a follow-up chest x-ray. Will obtain blood gases in the morning. He is on droplet precautions because of history of COVID-19 infection. We will avoid nebulizations at this time. Chest x-ray done 2/23 shows no significant change. Repeat chest x-ray shows no significant change in predominantly basilar interstitial infiltrates. 4.  Acute renal failure:  Creatinine has improved to 1.94, was 5.04. Nephrology has been consulted as well. However he has significant edema now particular right upper extremity. Dopplers of the right upper extremity did not show any DVT.   5.  Atrial fibrillation: Cardiology is also consulted as well. On diltiazem infusion. 6.  Multiple other medical problems including diabetes mellitus, hypertension, hypothyroidism, polycythemia vera, spinal stenosis and a history of transient ischemic attack. 7.  For deep venous thrombosis prophylaxis, he is started on heparin subcutaneously. 8.  For gastrointestinal stress prophylaxis, on IV Pepcid.     Thank you for allowing me to participate in the care of this patient. I will follow the patient closely with you. The patient is now DNR. He is also on dexamethasone which can be continued at this time. However decreasing the dose. Prognosis is guarded. Discussed with the nursing team in the ICU.   More than 30 minutes spent with patient care.  Alex Ramirez MD

## 2021-02-27 NOTE — PROGRESS NOTES
General Daily Progress Note          Patient Name:   Shemar Espinosa       YOB: 1934       Age:  80 y.o. Admit Date: 2/21/2021      Subjective:       Patient making the bed obtunded getting TPN  On Cardizem drip           Objective:     Visit Vitals  BP (!) 144/79 (BP 1 Location: Left lower arm, BP Patient Position: At rest)   Pulse 74   Temp 97.8 °F (36.6 °C)   Resp 28   Ht 6' (1.829 m)   Wt 73.5 kg (162 lb)   SpO2 95%   BMI 21.97 kg/m²        Recent Results (from the past 24 hour(s))   GLUCOSE, POC    Collection Time: 02/26/21  5:39 PM   Result Value Ref Range    Glucose (POC) 279 (H) 65 - 100 mg/dL    Performed by [address] [place] [address], POC    Collection Time: 02/26/21 11:42 PM   Result Value Ref Range    Glucose (POC) 218 (H) 65 - 100 mg/dL    Performed by [facility]    METABOLIC PANEL, COMPREHENSIVE    Collection Time: 02/27/21  4:40 AM   Result Value Ref Range    Sodium 147 (H) 136 - 145 mmol/L    Potassium 3.4 (L) 3.5 - 5.1 mmol/L    Chloride 118 (H) 97 - 108 mmol/L    CO2 22 21 - 32 mmol/L    Anion gap 7 5 - 15 mmol/L    Glucose 222 (H) 65 - 100 mg/dL    BUN 59 (H) 6 - 20 mg/dL    Creatinine 1.94 (H) 0.70 - 1.30 mg/dL    BUN/Creatinine ratio 30 (H) 12 - 20      GFR est AA 40 (L) >60 ml/min/1.73m2    GFR est non-AA 33 (L) >60 ml/min/1.73m2    Calcium 8.0 (L) 8.5 - 10.1 mg/dL    Bilirubin, total 0.4 0.2 - 1.0 mg/dL    AST (SGOT) 45 (H) 15 - 37 U/L    ALT (SGPT) 121 (H) 12 - 78 U/L    Alk. phosphatase 115 45 - 117 U/L    Protein, total 5.6 (L) 6.4 - 8.2 g/dL    Albumin 1.8 (L) 3.5 - 5.0 g/dL    Globulin 3.8 2.0 - 4.0 g/dL    A-G Ratio 0.5 (L) 1.1 - 2.2     GLUCOSE, POC    Collection Time: 02/27/21 11:48 AM   Result Value Ref Range    Glucose (POC) 256 (H) 65 - 100 mg/dL    Performed by Delroy Barth      [unfilled]      Review of Systems    Unable to obtain      Physical Exam:      Constitutional:  obtunded. HENT:   Head: Normocephalic and atraumatic.    Eyes: Pupils are equal, round, and reactive to light. EOM are normal.   Cardiovascular: Normal rate, regular rhythm and normal heart sounds. Pulmonary/Chest: Breath sounds normal. No wheezes. No rales. Exhibits no tenderness. Abdominal: Soft. Bowel sounds are normal. There is no abdominal tenderness. There is no rebound and no guarding. Musculoskeletal: Normal range of motion. Neurological: Obtunded    XR CHEST PORT   Final Result      XR CHEST PORT   Final Result   Gastric tube unchanged from the prior exam and should be advanced 8   cm for optimal positioning. Persistent interstitial infiltrates, unchanged. Please see full report. I called the CCU with this result and recommendation at   4:31 PM. I spoke with Asia Urias. She states the NG tube appears to be at its   distal aspect outside the patient. I suspect it is coiled within the throat as   the NG tube position appears unchanged from the earlier exam.      XR CHEST PORT   Final Result   Feeding tube tip at the GE junction. This should be advanced into   the stomach. Airspace opacities in the lungs, left greater than right, not   significantly changed. XR CHEST PORT   Final Result      US RETROPERITONEUM COMP   Final Result   Small right renal cyst. Mild left hydronephrosis of unclear   etiology. Gonzales catheter in the bladder. Please see full report. XR CHEST PORT   Final Result   New left lung opacities, nonspecific, but could represent an infectious process   in the appropriate clinical setting. Asymmetric edema could appear similar,   though is considered less likely.          XR CHEST PORT    (Results Pending)        Recent Results (from the past 24 hour(s))   GLUCOSE, POC    Collection Time: 02/26/21  5:39 PM   Result Value Ref Range    Glucose (POC) 279 (H) 65 - 100 mg/dL    Performed by [address] [place] [address]    Collection Time: 02/26/21 11:42 PM   Result Value Ref Range    Glucose (POC) 218 (H) 65 - 100 mg/dL    Performed by Radha Davis JERRELL    METABOLIC PANEL, COMPREHENSIVE    Collection Time: 02/27/21  4:40 AM   Result Value Ref Range    Sodium 147 (H) 136 - 145 mmol/L    Potassium 3.4 (L) 3.5 - 5.1 mmol/L    Chloride 118 (H) 97 - 108 mmol/L    CO2 22 21 - 32 mmol/L    Anion gap 7 5 - 15 mmol/L    Glucose 222 (H) 65 - 100 mg/dL    BUN 59 (H) 6 - 20 mg/dL    Creatinine 1.94 (H) 0.70 - 1.30 mg/dL    BUN/Creatinine ratio 30 (H) 12 - 20      GFR est AA 40 (L) >60 ml/min/1.73m2    GFR est non-AA 33 (L) >60 ml/min/1.73m2    Calcium 8.0 (L) 8.5 - 10.1 mg/dL    Bilirubin, total 0.4 0.2 - 1.0 mg/dL    AST (SGOT) 45 (H) 15 - 37 U/L    ALT (SGPT) 121 (H) 12 - 78 U/L    Alk.  phosphatase 115 45 - 117 U/L    Protein, total 5.6 (L) 6.4 - 8.2 g/dL    Albumin 1.8 (L) 3.5 - 5.0 g/dL    Globulin 3.8 2.0 - 4.0 g/dL    A-G Ratio 0.5 (L) 1.1 - 2.2     GLUCOSE, POC    Collection Time: 02/27/21 11:48 AM   Result Value Ref Range    Glucose (POC) 256 (H) 65 - 100 mg/dL    Performed by Pramod Bibiana        Results     Procedure Component Value Units Date/Time    CULTURE, BLOOD [[id number]] Collected: 02/23/21 0530    Order Status: Completed Specimen: Blood Updated: 02/27/21 1152     Special Requests: No Special Requests        Culture result: No growth 3 days       MRSA SCREEN - PCR (NASAL) [[id number]] Collected: 02/22/21 0350    Order Status: Completed Specimen: Swab Updated: 02/22/21 1310     MRSA by PCR, Nasal Not Detected       CULTURE, URINE [[id number]]  (Abnormal)  (Susceptibility) Collected: 02/21/21 1800    Order Status: Completed Specimen: Urine Updated: 02/24/21 1149     Special Requests: No Special Requests        Paige Count --        >100,000  colonies/ml       Culture result: Klebsiella pneumoniae         Enterococcus faecalis       Susceptibility      Klebsiella pneumoniae     TAZ     Amikacin ($) Susceptible     Ampicillin ($) Resistant     Ampicillin/sulbactam ($) Susceptible     Cefazolin ($) Susceptible     Cefepime ($$) Susceptible     Cefoxitin Susceptible     Ceftazidime ($) Susceptible     Ceftriaxone ($) Susceptible     Ciprofloxacin ($) Susceptible     Gentamicin ($) Susceptible     Levofloxacin ($) Susceptible     Meropenem ($$) Susceptible     Nitrofurantoin Intermediate     Piperacillin/Tazobac ($) Susceptible     Tobramycin ($) Susceptible     Trimeth/Sulfa Susceptible                Susceptibility      Enterococcus faecalis     TAZ     Ampicillin ($) Susceptible     Ciprofloxacin ($) Susceptible     Daptomycin ($$$$$) Susceptible     Levofloxacin ($) Susceptible     Linezolid ($$$$$) Susceptible     Nitrofurantoin Susceptible     Tetracycline Resistant     Vancomycin ($) Susceptible                    CULTURE, BLOOD, PAIRED [[id number]]  (Abnormal) Collected: 02/21/21 1027    Order Status: Completed Specimen: Blood Updated: 02/24/21 1024     Special Requests: No Special Requests        Culture result:       Staphylococcus species, coagulase negative MULTIPLE COLONY TYPES growing in 2 of 4 bottles drawn (SITE = L ARM)                  preliminary report of Gram Positive Cocci in clusters GROWING IN THE AEROBIC BOTTLE CALLED TO AND READ BACK BY NADEEM BRAVO 2/22/21 AT [address]                 Labs:     Recent Labs     02/25/21  0430   WBC 9.3   HGB 11.1*   HCT 33.4*   *     Recent Labs     02/27/21 0440 02/26/21 0450 02/25/21  0430   *  --  150*   K 3.4*  --  3.5   *  --  123*   CO2 22  --  20*   BUN 59*  --  120*   CREA 1.94* 2.50* 3.88*   *  --  367*   CA 8.0*  --  7.8*   PHOS  --   --  3.2     Recent Labs     02/27/21 0440 02/25/21 0430   *  --      --    TBILI 0.4  --    TP 5.6*  --    ALB 1.8* 1.8*   GLOB 3.8  --      No results for input(s): INR, PTP, APTT, INREXT in the last 72 hours. No results for input(s): FE, TIBC, PSAT, FERR in the last 72 hours. No results found for: FOL, RBCF   No results for input(s): PH, PCO2, PO2 in the last 72 hours.   No results for input(s): CPK, CKNDX, TROIQ in the last 72 hours.     No lab exists for component: CPKMB  Lab Results   Component Value Date/Time    Cholesterol, total 185 02/05/2021 01:37 PM    HDL Cholesterol 40 02/05/2021 01:37 PM    LDL, calculated 126.2 (H) 02/05/2021 01:37 PM    Triglyceride 94 02/05/2021 01:37 PM    CHOL/HDL Ratio 4.6 02/05/2021 01:37 PM     Lab Results   Component Value Date/Time    Glucose (POC) 256 (H) 02/27/2021 11:48 AM    Glucose (POC) 218 (H) 02/26/2021 11:42 PM    Glucose (POC) 279 (H) 02/26/2021 05:39 PM    Glucose (POC) 346 (H) 02/26/2021 11:20 AM    Glucose (POC) 323 (H) 02/26/2021 06:19 AM     Lab Results   Component Value Date/Time    Color [place] 02/21/2021 10:27 AM    Appearance Turbid (A) 02/21/2021 10:27 AM    Specific gravity 1.017 02/21/2021 10:27 AM    pH (UA) 5.0 02/21/2021 10:27 AM    Protein 100 (A) 02/21/2021 10:27 AM    Glucose Negative 02/21/2021 10:27 AM    Ketone Negative 02/21/2021 10:27 AM    Bilirubin Negative 02/21/2021 10:27 AM    Urobilinogen 0.1 02/21/2021 10:27 AM    Nitrites Negative 02/21/2021 10:27 AM    Leukocyte Esterase Large (A) 02/21/2021 10:27 AM    Bacteria 4+ (A) 02/21/2021 10:27 AM    Bacteria 4+ (A) 02/21/2021 10:27 AM    WBC >100 (H) 02/21/2021 10:27 AM    WBC >100 (H) 02/21/2021 10:27 AM    RBC 10-20 02/21/2021 10:27 AM    RBC 10-20 02/21/2021 10:27 AM         Assessment:     Sepsis with hypotension  Acute metabolic encephalopathy  Acute hypoxemic respiratory failure left-sided infiltrate  Acute kidney injury  Nonsustained V. tach    Plan:     On IV Zithromax and IV Zosyn  On IV Decadron  On IV vancomycin      Monitor renal function  Replace potassium  Cont  PPI        Current Facility-Administered Medications:     hydrOXYzine (VISTARIL) injection 25 mg, 25 mg, IntraMUSCular, Q6H PRN, Dayne Aquino,     amino acid 4.25 % dextrose 5 % with electrolytes (CLINIMIX E) infusion, , IntraVENous, CONTINUOUS, Laura Pillai NP, Last Rate: 42 mL/hr at 02/26/21 2349, New Bag at 02/26/21 2349    fat emulsion 20% (LIPOSYN, INTRAlipid) infusion 250 mL, 250 mL, IntraVENous, CONTINUOUS, Laura Pillai NP, Last Rate: 20.83 mL/hr at 02/26/21 2349, 250 mL at 02/26/21 2349    insulin lispro (HUMALOG) injection, , SubCUTAneous, Q6H, Candelario Alvarez MD, 6 Units at 02/27/21 0600    dexamethasone (DECADRON) 4 mg/mL injection 4 mg, 4 mg, IntraVENous, Q12H, Joselito Haile MD, 4 mg at 02/27/21 0855    dilTIAZem (CARDIZEM) 125 mg/125 mL (1 mg/mL) dextrose 5% infusion, 0-15 mg/hr, IntraVENous, TITRATE, Matthew He MD, Last Rate: 5 mL/hr at 02/26/21 1048, 5 mg/hr at 02/26/21 1048    dextrose 5% infusion, 150 mL/hr, IntraVENous, CONTINUOUS, Candelario Alvarez MD, Last Rate: 150 mL/hr at 02/27/21 0652, 150 mL/hr at 02/27/21 6447    dextrose (D50W) injection syrg 12.5 g, 25 mL, IntraVENous, PRN, Candelario Valdez MD, 12.5 g at 02/24/21 0957    NOREPINephrine (LEVOPHED) 8 mg in 5% dextrose 250mL (32 mcg/mL) infusion, 0.5-16 mcg/min, IntraVENous, TITRATE, Candelario Alvarez MD, Stopped at 02/22/21 0800    famotidine (PF) (PEPCID) 20 mg in 0.9% sodium chloride 10 mL injection, 20 mg, IntraVENous, DAILY, Joselito Haile MD, 20 mg at 02/27/21 0855    Vancomycin dose per pharmacy protocol, , Other, Rx Dosing/Monitoring, Milly Stuart MD    midodrine (PROAMATINE) tablet 5 mg, 5 mg, Oral, TID PRN, Milly Yan MD    sodium chloride (NS) flush 5-40 mL, 5-40 mL, IntraVENous, Q8H, Candelario Alvarez MD, 10 mL at 02/26/21 1501    sodium chloride (NS) flush 5-40 mL, 5-40 mL, IntraVENous, PRN, Candelario Alvarez MD    azithromycin (ZITHROMAX) 500 mg in 0.9% sodium chloride 250 mL (VIAL-MATE), 500 mg, IntraVENous, DAILY, Candelario Alvarez MD, 500 mg at 02/27/21 0857    heparin (porcine) injection 5,000 Units, 5,000 Units, SubCUTAneous, Q12H, Candelario Alvarez MD, 5,000 Units at 02/27/21 0422    glucose chewable tablet 16 g, 4 Tab, Oral, PRN, Yamile LENZ MD    dextrose (D50W) injection syrg 12.5-25 g, 25-50 mL, IntraVENous, PRN, Cassandra LENZ MD    glucagon (GLUCAGEN) injection 1 mg, 1 mg, IntraMUSCular, PRN, Hertford CongregationCandelario ortez MD    piperacillin-tazobactam (ZOSYN) 3.375 g in 0.9% sodium chloride (MBP/ADV) 100 mL MBP, 3.375 g, IntraVENous, Q12H, Milly Stuart MD, Last Rate: 200 mL/hr at 02/26/21 2332, 3.375 g at 02/26/21 2332

## 2021-02-27 NOTE — PROGRESS NOTES
1020hrs-Justin Vitale is on call for Dr. Karen Fair he was called in regards to patient having 6-7 beats of VTach. Spoke with Dr. Patria Carrasquillo no new orders received. 1644hrs-Troponins and magnesium labs sent per order.

## 2021-02-27 NOTE — PROGRESS NOTES
Attempted to see pt for PT eval; however, after speaking with RN, she reports that pt is not appropriate to work with due to having Vtach this morning. Will continue to follow and evaluate when pt is medically stable.

## 2021-02-28 LAB
ALBUMIN SERPL-MCNC: 1.8 G/DL (ref 3.5–5)
ALBUMIN/GLOB SERPL: 0.5 {RATIO} (ref 1.1–2.2)
ALP SERPL-CCNC: 122 U/L (ref 45–117)
ALT SERPL-CCNC: 110 U/L (ref 12–78)
ANION GAP SERPL CALC-SCNC: 5 MMOL/L (ref 5–15)
ANION GAP SERPL CALC-SCNC: 6 MMOL/L (ref 5–15)
ARTERIAL PATENCY WRIST A: POSITIVE
AST SERPL W P-5'-P-CCNC: 42 U/L (ref 15–37)
BASE DEFICIT BLDA-SCNC: 3 MMOL/L (ref 0–2)
BILIRUB SERPL-MCNC: 0.5 MG/DL (ref 0.2–1)
BUN SERPL-MCNC: 47 MG/DL (ref 6–20)
BUN SERPL-MCNC: 48 MG/DL (ref 6–20)
BUN/CREAT SERPL: 28 (ref 12–20)
BUN/CREAT SERPL: 29 (ref 12–20)
CA-I BLD-MCNC: 7.6 MG/DL (ref 8.5–10.1)
CA-I BLD-MCNC: 7.6 MG/DL (ref 8.5–10.1)
CHLORIDE SERPL-SCNC: 114 MMOL/L (ref 97–108)
CHLORIDE SERPL-SCNC: 115 MMOL/L (ref 97–108)
CO2 SERPL-SCNC: 22 MMOL/L (ref 21–32)
CO2 SERPL-SCNC: 23 MMOL/L (ref 21–32)
CREAT SERPL-MCNC: 1.63 MG/DL (ref 0.7–1.3)
CREAT SERPL-MCNC: 1.65 MG/DL (ref 0.7–1.3)
DATE LAST DOSE: NORMAL
FIO2 ON VENT: 21 %
GLOBULIN SER CALC-MCNC: 3.9 G/DL (ref 2–4)
GLUCOSE BLD STRIP.AUTO-MCNC: 175 MG/DL (ref 65–100)
GLUCOSE BLD STRIP.AUTO-MCNC: 265 MG/DL (ref 65–100)
GLUCOSE SERPL-MCNC: 263 MG/DL (ref 65–100)
GLUCOSE SERPL-MCNC: 291 MG/DL (ref 65–100)
HCO3 BLDA-SCNC: 22 MMOL/L (ref 22–26)
PCO2 BLDA: 28 MMHG (ref 35–45)
PERFORMED BY, TECHID: ABNORMAL
PERFORMED BY, TECHID: ABNORMAL
PH BLDA: 7.46 [PH] (ref 7.35–7.45)
PO2 BLDA: 48 MMHG (ref 75–100)
POTASSIUM SERPL-SCNC: 3.4 MMOL/L (ref 3.5–5.1)
POTASSIUM SERPL-SCNC: 3.4 MMOL/L (ref 3.5–5.1)
PROT SERPL-MCNC: 5.7 G/DL (ref 6.4–8.2)
REPORTED DOSE,DOSE: NORMAL UNITS
SAO2 % BLD: 85 %
SAO2% DEVICE SAO2% SENSOR NAME: ABNORMAL
SERVICE CMNT-IMP: ABNORMAL
SODIUM SERPL-SCNC: 141 MMOL/L (ref 136–145)
SODIUM SERPL-SCNC: 144 MMOL/L (ref 136–145)
SPECIMEN SITE: ABNORMAL
TROPONIN I SERPL-MCNC: <0.05 NG/ML
VANCOMYCIN SERPL-MCNC: 17.6 UG/ML

## 2021-02-28 PROCEDURE — 74011000250 HC RX REV CODE- 250: Performed by: FAMILY MEDICINE

## 2021-02-28 PROCEDURE — 84484 ASSAY OF TROPONIN QUANT: CPT

## 2021-02-28 PROCEDURE — 80048 BASIC METABOLIC PNL TOTAL CA: CPT

## 2021-02-28 PROCEDURE — 74011250636 HC RX REV CODE- 250/636: Performed by: FAMILY MEDICINE

## 2021-02-28 PROCEDURE — 74011636637 HC RX REV CODE- 636/637: Performed by: FAMILY MEDICINE

## 2021-02-28 PROCEDURE — 77010033678 HC OXYGEN DAILY

## 2021-02-28 PROCEDURE — 74011250636 HC RX REV CODE- 250/636: Performed by: INTERNAL MEDICINE

## 2021-02-28 PROCEDURE — 36415 COLL VENOUS BLD VENIPUNCTURE: CPT

## 2021-02-28 PROCEDURE — 74011636637 HC RX REV CODE- 636/637: Performed by: INTERNAL MEDICINE

## 2021-02-28 PROCEDURE — 82803 BLOOD GASES ANY COMBINATION: CPT

## 2021-02-28 PROCEDURE — 82962 GLUCOSE BLOOD TEST: CPT

## 2021-02-28 PROCEDURE — 80053 COMPREHEN METABOLIC PANEL: CPT

## 2021-02-28 PROCEDURE — P9047 ALBUMIN (HUMAN), 25%, 50ML: HCPCS | Performed by: FAMILY MEDICINE

## 2021-02-28 PROCEDURE — 74011000250 HC RX REV CODE- 250: Performed by: INTERNAL MEDICINE

## 2021-02-28 PROCEDURE — 65610000006 HC RM INTENSIVE CARE

## 2021-02-28 PROCEDURE — 74011000258 HC RX REV CODE- 258: Performed by: INTERNAL MEDICINE

## 2021-02-28 PROCEDURE — 80202 ASSAY OF VANCOMYCIN: CPT

## 2021-02-28 PROCEDURE — 74011000258 HC RX REV CODE- 258: Performed by: FAMILY MEDICINE

## 2021-02-28 PROCEDURE — 74011250636 HC RX REV CODE- 250/636: Performed by: LEGAL MEDICINE

## 2021-02-28 RX ORDER — FUROSEMIDE 10 MG/ML
20 INJECTION INTRAMUSCULAR; INTRAVENOUS 2 TIMES DAILY
Status: DISCONTINUED | OUTPATIENT
Start: 2021-02-28 | End: 2021-02-28

## 2021-02-28 RX ORDER — METOPROLOL TARTRATE 5 MG/5ML
5 INJECTION INTRAVENOUS EVERY 6 HOURS
Status: DISCONTINUED | OUTPATIENT
Start: 2021-02-28 | End: 2021-03-03 | Stop reason: HOSPADM

## 2021-02-28 RX ORDER — FACIAL-BODY WIPES
10 EACH TOPICAL DAILY PRN
Status: DISCONTINUED | OUTPATIENT
Start: 2021-02-28 | End: 2021-03-03 | Stop reason: HOSPADM

## 2021-02-28 RX ORDER — FUROSEMIDE 10 MG/ML
40 INJECTION INTRAMUSCULAR; INTRAVENOUS ONCE
Status: COMPLETED | OUTPATIENT
Start: 2021-02-28 | End: 2021-02-28

## 2021-02-28 RX ORDER — FUROSEMIDE 10 MG/ML
40 INJECTION INTRAMUSCULAR; INTRAVENOUS 2 TIMES DAILY
Status: DISCONTINUED | OUTPATIENT
Start: 2021-02-28 | End: 2021-03-02

## 2021-02-28 RX ORDER — ALBUMIN HUMAN 250 G/1000ML
25 SOLUTION INTRAVENOUS ONCE
Status: COMPLETED | OUTPATIENT
Start: 2021-02-28 | End: 2021-02-28

## 2021-02-28 RX ORDER — MIDODRINE HYDROCHLORIDE 5 MG/1
5 TABLET ORAL 4 TIMES DAILY
Status: DISCONTINUED | OUTPATIENT
Start: 2021-02-28 | End: 2021-03-03 | Stop reason: HOSPADM

## 2021-02-28 RX ORDER — MAGNESIUM SULFATE 1 G/100ML
1 INJECTION INTRAVENOUS ONCE
Status: COMPLETED | OUTPATIENT
Start: 2021-02-28 | End: 2021-02-28

## 2021-02-28 RX ADMIN — FUROSEMIDE 40 MG: 10 INJECTION, SOLUTION INTRAMUSCULAR; INTRAVENOUS at 21:49

## 2021-02-28 RX ADMIN — HEPARIN SODIUM 5000 UNITS: 5000 INJECTION INTRAVENOUS; SUBCUTANEOUS at 15:00

## 2021-02-28 RX ADMIN — Medication 10 ML: at 21:59

## 2021-02-28 RX ADMIN — FUROSEMIDE 40 MG: 10 INJECTION, SOLUTION INTRAMUSCULAR; INTRAVENOUS at 17:49

## 2021-02-28 RX ADMIN — HEPARIN SODIUM 5000 UNITS: 5000 INJECTION INTRAVENOUS; SUBCUTANEOUS at 03:54

## 2021-02-28 RX ADMIN — FAMOTIDINE 20 MG: 10 INJECTION INTRAVENOUS at 09:31

## 2021-02-28 RX ADMIN — Medication 10 ML: at 06:00

## 2021-02-28 RX ADMIN — VANCOMYCIN HYDROCHLORIDE 1000 MG: 1 INJECTION, POWDER, LYOPHILIZED, FOR SOLUTION INTRAVENOUS at 21:52

## 2021-02-28 RX ADMIN — ALBUMIN (HUMAN) 25 G: 0.25 INJECTION, SOLUTION INTRAVENOUS at 16:11

## 2021-02-28 RX ADMIN — DEXAMETHASONE SODIUM PHOSPHATE 4 MG: 4 INJECTION, SOLUTION INTRA-ARTICULAR; INTRALESIONAL; INTRAMUSCULAR; INTRAVENOUS; SOFT TISSUE at 21:46

## 2021-02-28 RX ADMIN — INSULIN LISPRO 6 UNITS: 100 INJECTION, SOLUTION INTRAVENOUS; SUBCUTANEOUS at 01:12

## 2021-02-28 RX ADMIN — INSULIN LISPRO 6 UNITS: 100 INJECTION, SOLUTION INTRAVENOUS; SUBCUTANEOUS at 12:00

## 2021-02-28 RX ADMIN — PIPERACILLIN AND TAZOBACTAM 3.38 G: 3; .375 INJECTION, POWDER, LYOPHILIZED, FOR SOLUTION INTRAVENOUS at 11:52

## 2021-02-28 RX ADMIN — FUROSEMIDE 20 MG: 10 INJECTION, SOLUTION INTRAMUSCULAR; INTRAVENOUS at 13:34

## 2021-02-28 RX ADMIN — Medication 40 ML: at 14:00

## 2021-02-28 RX ADMIN — INSULIN LISPRO 6 UNITS: 100 INJECTION, SOLUTION INTRAVENOUS; SUBCUTANEOUS at 06:28

## 2021-02-28 RX ADMIN — METOPROLOL TARTRATE 5 MG: 5 INJECTION INTRAVENOUS at 17:49

## 2021-02-28 RX ADMIN — TRACE ELEMENTS INJECTION 4: 7.4; .75; 98; 151 INJECTION, SOLUTION INTRAVENOUS at 22:00

## 2021-02-28 RX ADMIN — MAGNESIUM SULFATE 1 G: 1 INJECTION INTRAVENOUS at 17:52

## 2021-02-28 RX ADMIN — DEXAMETHASONE SODIUM PHOSPHATE 4 MG: 4 INJECTION, SOLUTION INTRA-ARTICULAR; INTRALESIONAL; INTRAMUSCULAR; INTRAVENOUS; SOFT TISSUE at 09:31

## 2021-02-28 RX ADMIN — AZITHROMYCIN DIHYDRATE 500 MG: 500 INJECTION, POWDER, LYOPHILIZED, FOR SOLUTION INTRAVENOUS at 09:31

## 2021-02-28 RX ADMIN — INSULIN LISPRO 2 UNITS: 100 INJECTION, SOLUTION INTRAVENOUS; SUBCUTANEOUS at 18:00

## 2021-02-28 NOTE — PROGRESS NOTES
Patient is seen and examined today    I looked for RN taking care of the patient but she is on other side I was told. patient seems to be having more swelling right side of the body- evidenced by swollen right upper extremity and lower extremity- 3-4+  Where as he has swelling on left side  only 2 +.   patient tries to open his eyes but he does not respond to any questions. yesterday patient was noted to receive almost 200 mL of fluid per hour all to gether     Hence dextrose 5% was decreased from 150 mL to 50 mL/hour  With 1 amp of sodium bicarbonate and 30 mEq   of KCl.         Past Medical History:   Diagnosis Date    [facility]rial fibrillation [facility] [address]     Diabetes mellitus type 2, uncomplicated ([facility] [address]     Hyperlipidemia     Hypertension     Hypothyroidism     Polycythemia vera ([facility] [address]     Prostatic hyperplasia     Spinal stenosis     Transient ischemic attack       Past Surgical History:   Procedure Laterality Date    HX OTHER SURGICAL      None     Family History   Problem Relation Age of Onset    Hypertension Mother     Stroke Mother     Lung Cancer Father     Melanoma Father       Social History     Tobacco Use    Smoking status: Unknown If Ever Smoked   Substance Use Topics    Alcohol use: Never     Frequency: Never     Binge frequency: Never       Current Facility-Administered Medications   Medication Dose Route Frequency Provider Last Rate Last Admin    metoprolol (LOPRESSOR) injection 5 mg  5 mg IntraVENous Q6H Dimas DICKERSON MD   Stopped at 02/28/21 1336    furosemide (LASIX) injection 20 mg  20 mg IntraVENous BID Maria Del Carmen Ngo MD   20 mg at 02/28/21 1334    amino acid 4.25 % dextrose 5 % with electrolytes (CLINIMIX E) infusion   IntraVENous CONTINUOUS Iman Valadez MD        fat emulsion 20% (LIPOSYN, INTRAlipid) infusion 250 mL  250 mL IntraVENous CONTINUOUS Henrique Valadez MD        bisacodyL (DULCOLAX) suppository 10 mg  10 mg Rectal DAILY PRN Lizbet Amezcua MD       [facility] hydrOXYzine (VISTARIL) injection 25 mg  25 mg IntraMUSCular Q6H PRN Kenia DayneDO   25 mg at 02/27/21 1306    fat emulsion 20% (LIPOSYN, INTRAlipid) infusion 250 mL  250 mL IntraVENous CONTINUOUS Henrique Valadez MD 20.83 mL/hr at 02/27/21 2209 250 mL at 02/27/21 2209    amino acid 4.25 % dextrose 5 % with electrolytes (CLINIMIX E) infusion   IntraVENous CONTINUOUS Henrique Valadez MD 40 mL/hr at 02/27/21 2207 New Bag at 02/27/21 2207    insulin lispro (HUMALOG) injection   SubCUTAneous Q6H Yinka LENZ MD   6 Units at 02/28/21 1200    dexamethasone (DECADRON) 4 mg/mL injection 4 mg  4 mg IntraVENous Q12H Neirs Juan MD   4 mg at 02/28/21 0931    dextrose (D50W) injection syrg 12.5 g  25 mL IntraVENous PRN Akhil June MD   12.5 g at 02/24/21 0957    NOREPINephrine (LEVOPHED) 8 mg in 5% dextrose 250mL (32 mcg/mL) infusion  0.5-16 mcg/min IntraVENous TITRATE Akhil June MD   Stopped at 02/22/21 0800    famotidine (PF) (PEPCID) 20 mg in 0.9% sodium chloride 10 mL injection  20 mg IntraVENous DAILY Neris Juan MD   20 mg at 02/28/21 0931    Vancomycin dose per pharmacy protocol   Other Rx Dosing/Monitoring Temo Jones MD        midodrine (PROAMATINE) tablet 5 mg  5 mg Oral TID PRN Temo Jones MD        sodium chloride (NS) flush 5-40 mL  5-40 mL IntraVENous Q8H Candelario Alvarez MD   40 mL at 02/28/21 1400    sodium chloride (NS) flush 5-40 mL  5-40 mL IntraVENous PRN Yinka LENZ MD        azithromycin (ZITHROMAX) 500 mg in 0.9% sodium chloride 250 mL (VIAL-MATE)  500 mg IntraVENous DAILY Yinka LENZ MD   500 mg at 02/28/21 0931    heparin (porcine) injection 5,000 Units  5,000 Units SubCUTAneous Q12H Akhil June MD   5,000 Units at 02/28/21 1500    glucose chewable tablet 16 g  4 Tab Oral PRN Yinka LENZ MD        dextrose (D50W) injection syrg 12.5-25 g  25-50 mL IntraVENous PRN Akhil June MD        glucagon (GLUCAGEN) injection 1 mg  1 mg IntraMUSCular PRN Wade LENZ MD        piperacillin-tazobactam (ZOSYN) 3.375 g in 0.9% sodium chloride (MBP/ADV) 100 mL MBP  3.375 g IntraVENous Q12H Ryan Botello  mL/hr at 02/28/21 1152 3.375 g at 02/28/21 1152        Review of Systems   Unable to perform ROS: Patient nonverbal       Objective     Vital signs for last 24 hours:  Visit Vitals  BP (!) 129/91 (BP 1 Location: Left lower arm, BP Patient Position: At rest)   Pulse 86   Temp 97.5 °F (36.4 °C)   Resp 30   Ht 6' (1.829 m)   Wt 80.3 kg (177 lb 0.5 oz)   SpO2 96%   BMI 24.01 kg/m²       Intake/Output this shift:  Current Shift: 02/28 0701 - 02/28 1900  In: -   Out: 1100 [Urine:1100]  Last 3 Shifts: 02/26 1901 - 02/28 0700  In: -   Out: 2500 [Urine:2500]  Physical Exam   Constitutional: He appears well-developed and well-nourished. HENT:   Head: Normocephalic. Pulmonary/Chest: Effort normal and breath sounds normal.   Abdominal: Soft. Bowel sounds are normal.   Skin: Skin is warm and dry.    Nonverbal, lethargic  Not on oxygen anymore  Rectal tube in place with dark green liquid stool  Gonzales catheter with clear urine  Data Review:   Recent Results (from the past 24 hour(s))   GLUCOSE, POC    Collection Time: 02/27/21  5:41 PM   Result Value Ref Range    Glucose (POC) 328 (H) 65 - 100 mg/dL    Performed by Purnima Subramanian    TROPONIN I    Collection Time: 02/27/21 10:30 PM   Result Value Ref Range    Troponin-I, Qt. <0.05 <0.05 ng/mL   GLUCOSE, POC    Collection Time: 02/27/21 11:35 PM   Result Value Ref Range    Glucose (POC) 283 (H) 65 - 100 mg/dL    Performed by Viki Christopher    TROPONIN I    Collection Time: 02/28/21  1:20 AM   Result Value Ref Range    Troponin-I, Qt. <0.05 <9.96 ng/mL   METABOLIC PANEL, COMPREHENSIVE    Collection Time: 02/28/21  1:20 AM   Result Value Ref Range    Sodium 141 136 - 145 mmol/L    Potassium 3.4 (L) 3.5 - 5.1 mmol/L    Chloride 114 (H) 97 - 108 mmol/L    CO2 22 21 - 32 mmol/L    Anion gap 5 5 - 15 mmol/L    Glucose 291 (H) 65 - 100 mg/dL    BUN 47 (H) 6 - 20 mg/dL    Creatinine 1.65 (H) 0.70 - 1.30 mg/dL    BUN/Creatinine ratio 28 (H) 12 - 20      GFR est AA 48 (L) >60 ml/min/1.73m2    GFR est non-AA 40 (L) >60 ml/min/1.73m2    Calcium 7.6 (L) 8.5 - 10.1 mg/dL    Bilirubin, total 0.5 0.2 - 1.0 mg/dL    AST (SGOT) 42 (H) 15 - 37 U/L    ALT (SGPT) 110 (H) 12 - 78 U/L    Alk. phosphatase 122 (H) 45 - 117 U/L    Protein, total 5.7 (L) 6.4 - 8.2 g/dL    Albumin 1.8 (L) 3.5 - 5.0 g/dL    Globulin 3.9 2.0 - 4.0 g/dL    A-G Ratio 0.5 (L) 1.1 - 2.2     METABOLIC PANEL, BASIC    Collection Time: 02/28/21  4:14 AM   Result Value Ref Range    Sodium 144 136 - 145 mmol/L    Potassium 3.4 (L) 3.5 - 5.1 mmol/L    Chloride 115 (H) 97 - 108 mmol/L    CO2 23 21 - 32 mmol/L    Anion gap 6 5 - 15 mmol/L    Glucose 263 (H) 65 - 100 mg/dL    BUN 48 (H) 6 - 20 mg/dL    Creatinine 1.63 (H) 0.70 - 1.30 mg/dL    BUN/Creatinine ratio 29 (H) 12 - 20      GFR est AA 49 (L) >60 ml/min/1.73m2    GFR est non-AA 40 (L) >60 ml/min/1.73m2    Calcium 7.6 (L) 8.5 - 10.1 mg/dL   BLOOD GAS, ARTERIAL    Collection Time: 02/28/21  4:15 AM   Result Value Ref Range    pH 7.46 (H) 7.35 - 7.45      PCO2 28 (L) 35 - 45 mmHg    PO2 48 (LL) 75 - 100 mmHg    O2 SAT 85 (L) >95 %    BICARBONATE 22 22 - 26 mmol/L    BASE DEFICIT 3.0 (H) 0 - 2 mmol/L    O2 METHOD RA      FIO2 21 %    Sample source Right Radial      VIGNESH'S TEST Positive      Critical value read back CVRB Carrillo Harrison, RN @[phone]    GLUCOSE, POC    Collection Time: 02/28/21 11:51 AM   Result Value Ref Range    Glucose (POC) 265 (H) 65 - 100 mg/dL    Performed by LUCILLE Blanca    Collection Time: 02/28/21  1:50 PM   Result Value Ref Range    Vancomycin, random 17.6 ug/mL    Reported dose date Dose Dependent      Reported dose: Dose Dependent Units         XR CHEST PORT   Final Result   No significant change.       XR CHEST PORT   Final Result      XR CHEST PORT   Final Result Gastric tube unchanged from the prior exam and should be advanced 8   cm for optimal positioning. Persistent interstitial infiltrates, unchanged. Please see full report. I called the CCU with this result and recommendation at   4:31 PM. I spoke with Cuco Gifford. She states the NG tube appears to be at its   distal aspect outside the patient. I suspect it is coiled within the throat as   the NG tube position appears unchanged from the earlier exam.      XR CHEST PORT   Final Result   Feeding tube tip at the GE junction. This should be advanced into   the stomach. Airspace opacities in the lungs, left greater than right, not   significantly changed. XR CHEST PORT   Final Result      US RETROPERITONEUM COMP   Final Result   Small right renal cyst. Mild left hydronephrosis of unclear   etiology. Gonzales catheter in the bladder. Please see full report. XR CHEST PORT   Final Result   New left lung opacities, nonspecific, but could represent an infectious process   in the appropriate clinical setting. Asymmetric edema could appear similar,   though is considered less likely. Patient Active Problem List   Diagnosis Code    Fall W19. XXXA    Weakness R53.1    Pneumonia J18.9    Renal failure N19    Sepsis ([facility] [address] A41.9        DIAGNOSES:  1. Acute kidney injury, grade 3 ( severe) with improvement in urine output  2. COVID-19 pneumonia   3. fluid overload  4.  hypokalemia  5.  severe malnutrition  6. Encephalopathy       PLAN:   stop IV fluids  Dextrose 5% with 1 amp of sodium bicarbonate and kcl   increase TPN  to 60 mL per hour    Rx patient with  Lasix 40 mg IV q  12 hours  Until fluid overload is resolved   start with 1st dose now   will increase midodrine 5 mg to  every 6 hours- to avoid  Hypotension   magnesium sulfate 1 g IV for low magnesium level   pharmacy can increase KCl in TPN to avoid  Hypokalemia.    patient may benefit from adding insulin TPN  For better control of diabetes thank you

## 2021-02-28 NOTE — PROGRESS NOTES
General Daily Progress Note          Patient Name:   Freedom Coon       YOB: 1934       Age:  80 y.o. Admit Date: 2/21/2021      Subjective:       Patient making the bed obtunded getting TPN  On Cardizem drip             Objective:     Visit Vitals  /74 (BP 1 Location: Left lower arm, BP Patient Position: At rest)   Pulse 72   Temp 97.9 °F (36.6 °C)   Resp 30   Ht 6' (1.829 m)   Wt 80.3 kg (177 lb 0.5 oz)   SpO2 98%   BMI 24.01 kg/m²        Recent Results (from the past 24 hour(s))   VANCOMYCIN, RANDOM    Collection Time: 02/27/21  3:55 PM   Result Value Ref Range    Vancomycin, random 13.2 ug/mL   GLUCOSE, POC    Collection Time: 02/27/21  5:41 PM   Result Value Ref Range    Glucose (POC) 328 (H) 65 - 100 mg/dL    Performed by Purnima Subramanian    TROPONIN I    Collection Time: 02/27/21 10:30 PM   Result Value Ref Range    Troponin-I, Qt. <0.05 <0.05 ng/mL   GLUCOSE, POC    Collection Time: 02/27/21 11:35 PM   Result Value Ref Range    Glucose (POC) 283 (H) 65 - 100 mg/dL    Performed by Toño Perez    TROPONIN I    Collection Time: 02/28/21  1:20 AM   Result Value Ref Range    Troponin-I, Qt. <0.05 <7.84 ng/mL   METABOLIC PANEL, COMPREHENSIVE    Collection Time: 02/28/21  1:20 AM   Result Value Ref Range    Sodium 141 136 - 145 mmol/L    Potassium 3.4 (L) 3.5 - 5.1 mmol/L    Chloride 114 (H) 97 - 108 mmol/L    CO2 22 21 - 32 mmol/L    Anion gap 5 5 - 15 mmol/L    Glucose 291 (H) 65 - 100 mg/dL    BUN 47 (H) 6 - 20 mg/dL    Creatinine 1.65 (H) 0.70 - 1.30 mg/dL    BUN/Creatinine ratio 28 (H) 12 - 20      GFR est AA 48 (L) >60 ml/min/1.73m2    GFR est non-AA 40 (L) >60 ml/min/1.73m2    Calcium 7.6 (L) 8.5 - 10.1 mg/dL    Bilirubin, total 0.5 0.2 - 1.0 mg/dL    AST (SGOT) 42 (H) 15 - 37 U/L    ALT (SGPT) 110 (H) 12 - 78 U/L    Alk.  phosphatase 122 (H) 45 - 117 U/L    Protein, total 5.7 (L) 6.4 - 8.2 g/dL    Albumin 1.8 (L) 3.5 - 5.0 g/dL    Globulin 3.9 2.0 - 4.0 g/dL    A-G Ratio 0.5 (L) 1.1 - 2.2     METABOLIC PANEL, BASIC    Collection Time: 02/28/21  4:14 AM   Result Value Ref Range    Sodium 144 136 - 145 mmol/L    Potassium 3.4 (L) 3.5 - 5.1 mmol/L    Chloride 115 (H) 97 - 108 mmol/L    CO2 23 21 - 32 mmol/L    Anion gap 6 5 - 15 mmol/L    Glucose 263 (H) 65 - 100 mg/dL    BUN 48 (H) 6 - 20 mg/dL    Creatinine 1.63 (H) 0.70 - 1.30 mg/dL    BUN/Creatinine ratio 29 (H) 12 - 20      GFR est AA 49 (L) >60 ml/min/1.73m2    GFR est non-AA 40 (L) >60 ml/min/1.73m2    Calcium 7.6 (L) 8.5 - 10.1 mg/dL   BLOOD GAS, ARTERIAL    Collection Time: 02/28/21  4:15 AM   Result Value Ref Range    pH 7.46 (H) 7.35 - 7.45      PCO2 28 (L) 35 - 45 mmHg    PO2 48 (LL) 75 - 100 mmHg    O2 SAT 85 (L) >95 %    BICARBONATE 22 22 - 26 mmol/L    BASE DEFICIT 3.0 (H) 0 - 2 mmol/L    O2 METHOD RA      FIO2 21 %    Sample source Right Radial      VIGNESH'S TEST Positive      Critical value read back CV Rupal Rodriguez, RN @[phone]    GLUCOSE, POC    Collection Time: 02/28/21 11:51 AM   Result Value Ref Range    Glucose (POC) 265 (H) 65 - 100 mg/dL    Performed by Nikki Palmer      [unfilled]      Review of Systems    Unable to obtain      Physical Exam:      Constitutional:  obtunded. HENT:   Head: Normocephalic and atraumatic. Eyes: Pupils are equal, round, and reactive to light. EOM are normal.   Cardiovascular: Normal rate, regular rhythm and normal heart sounds. Pulmonary/Chest: Breath sounds normal. No wheezes. No rales. Exhibits no tenderness. Abdominal: Soft. Bowel sounds are normal. There is no abdominal tenderness. There is no rebound and no guarding. Musculoskeletal: Normal range of motion. Neurological: Obtunded    XR CHEST PORT   Final Result   No significant change. XR CHEST PORT   Final Result      XR CHEST PORT   Final Result   Gastric tube unchanged from the prior exam and should be advanced 8   cm for optimal positioning. Persistent interstitial infiltrates, unchanged.    Please see full report. I called the CCU with this result and recommendation at   4:31 PM. I spoke with Jamal Rider. She states the NG tube appears to be at its   distal aspect outside the patient. I suspect it is coiled within the throat as   the NG tube position appears unchanged from the earlier exam.      XR CHEST PORT   Final Result   Feeding tube tip at the GE junction. This should be advanced into   the stomach. Airspace opacities in the lungs, left greater than right, not   significantly changed. XR CHEST PORT   Final Result      US RETROPERITONEUM COMP   Final Result   Small right renal cyst. Mild left hydronephrosis of unclear   etiology. Gonzales catheter in the bladder. Please see full report. XR CHEST PORT   Final Result   New left lung opacities, nonspecific, but could represent an infectious process   in the appropriate clinical setting. Asymmetric edema could appear similar,   though is considered less likely.               Recent Results (from the past 24 hour(s))   VANCOMYCIN, RANDOM    Collection Time: 02/27/21  3:55 PM   Result Value Ref Range    Vancomycin, random 13.2 ug/mL   GLUCOSE, POC    Collection Time: 02/27/21  5:41 PM   Result Value Ref Range    Glucose (POC) 328 (H) 65 - 100 mg/dL    Performed by Purnima Subramanian    TROPONIN I    Collection Time: 02/27/21 10:30 PM   Result Value Ref Range    Troponin-I, Qt. <0.05 <0.05 ng/mL   GLUCOSE, POC    Collection Time: 02/27/21 11:35 PM   Result Value Ref Range    Glucose (POC) 283 (H) 65 - 100 mg/dL    Performed by Latesha Min    TROPONIN I    Collection Time: 02/28/21  1:20 AM   Result Value Ref Range    Troponin-I, Qt. <0.05 <9.26 ng/mL   METABOLIC PANEL, COMPREHENSIVE    Collection Time: 02/28/21  1:20 AM   Result Value Ref Range    Sodium 141 136 - 145 mmol/L    Potassium 3.4 (L) 3.5 - 5.1 mmol/L    Chloride 114 (H) 97 - 108 mmol/L    CO2 22 21 - 32 mmol/L    Anion gap 5 5 - 15 mmol/L    Glucose 291 (H) 65 - 100 mg/dL    BUN 47 (H) 6 - 20 mg/dL Creatinine 1.65 (H) 0.70 - 1.30 mg/dL    BUN/Creatinine ratio 28 (H) 12 - 20      GFR est AA 48 (L) >60 ml/min/1.73m2    GFR est non-AA 40 (L) >60 ml/min/1.73m2    Calcium 7.6 (L) 8.5 - 10.1 mg/dL    Bilirubin, total 0.5 0.2 - 1.0 mg/dL    AST (SGOT) 42 (H) 15 - 37 U/L    ALT (SGPT) 110 (H) 12 - 78 U/L    Alk.  phosphatase 122 (H) 45 - 117 U/L    Protein, total 5.7 (L) 6.4 - 8.2 g/dL    Albumin 1.8 (L) 3.5 - 5.0 g/dL    Globulin 3.9 2.0 - 4.0 g/dL    A-G Ratio 0.5 (L) 1.1 - 2.2     METABOLIC PANEL, BASIC    Collection Time: 02/28/21  4:14 AM   Result Value Ref Range    Sodium 144 136 - 145 mmol/L    Potassium 3.4 (L) 3.5 - 5.1 mmol/L    Chloride 115 (H) 97 - 108 mmol/L    CO2 23 21 - 32 mmol/L    Anion gap 6 5 - 15 mmol/L    Glucose 263 (H) 65 - 100 mg/dL    BUN 48 (H) 6 - 20 mg/dL    Creatinine 1.63 (H) 0.70 - 1.30 mg/dL    BUN/Creatinine ratio 29 (H) 12 - 20      GFR est AA 49 (L) >60 ml/min/1.73m2    GFR est non-AA 40 (L) >60 ml/min/1.73m2    Calcium 7.6 (L) 8.5 - 10.1 mg/dL   BLOOD GAS, ARTERIAL    Collection Time: 02/28/21  4:15 AM   Result Value Ref Range    pH 7.46 (H) 7.35 - 7.45      PCO2 28 (L) 35 - 45 mmHg    PO2 48 (LL) 75 - 100 mmHg    O2 SAT 85 (L) >95 %    BICARBONATE 22 22 - 26 mmol/L    BASE DEFICIT 3.0 (H) 0 - 2 mmol/L    O2 METHOD RA      FIO2 21 %    Sample source Right Radial      VIGNESH'S TEST Positive      Critical value read back CVRB Corey Washington, RN @[phone]    GLUCOSE, POC    Collection Time: 02/28/21 11:51 AM   Result Value Ref Range    Glucose (POC) 265 (H) 65 - 100 mg/dL    Performed by Barrie Segovia        Results     Procedure Component Value Units Date/Time    CULTURE, BLOOD [[id number]] Collected: 02/23/21 0530    Order Status: Completed Specimen: Blood Updated: 02/28/21 1042     Special Requests: No Special Requests        Culture result: No growth 4 days       MRSA SCREEN - PCR (NASAL) [[id number]] Collected: 02/22/21 0350    Order Status: Completed Specimen: Swab Updated: 02/22/21 1310 MRSA by PCR, Nasal Not Detected       CULTURE, URINE [[id number]]  (Abnormal)  (Susceptibility) Collected: 02/21/21 1800    Order Status: Completed Specimen: Urine Updated: 02/24/21 1149     Special Requests: No Special Requests        [place] Count --        >100,000  colonies/ml       Culture result: Klebsiella pneumoniae         Enterococcus faecalis       Susceptibility      Klebsiella pneumoniae     TAZ     Amikacin ($) Susceptible     Ampicillin ($) Resistant     Ampicillin/sulbactam ($) Susceptible     Cefazolin ($) Susceptible     Cefepime ($$) Susceptible     Cefoxitin Susceptible     Ceftazidime ($) Susceptible     Ceftriaxone ($) Susceptible     Ciprofloxacin ($) Susceptible     Gentamicin ($) Susceptible     Levofloxacin ($) Susceptible     Meropenem ($$) Susceptible     Nitrofurantoin Intermediate     Piperacillin/Tazobac ($) Susceptible     Tobramycin ($) Susceptible     Trimeth/Sulfa Susceptible                Susceptibility      Enterococcus faecalis     TAZ     Ampicillin ($) Susceptible     Ciprofloxacin ($) Susceptible     Daptomycin ($$$$$) Susceptible     Levofloxacin ($) Susceptible     Linezolid ($$$$$) Susceptible     Nitrofurantoin Susceptible     Tetracycline Resistant     Vancomycin ($) Susceptible                    CULTURE, BLOOD, PAIRED [[id number]]  (Abnormal) Collected: 02/21/21 1027    Order Status: Completed Specimen: Blood Updated: 02/24/21 1024     Special Requests: No Special Requests        Culture result:       Staphylococcus species, coagulase negative MULTIPLE COLONY TYPES growing in 2 of 4 bottles drawn (SITE = L ARM)                  preliminary report of Gram Positive Cocci in clusters GROWING IN THE AEROBIC BOTTLE CALLED TO AND READ BACK BY NADEEM BRAVO 2/22/21 AT [facility]                 Labs:     No results for input(s): WBC, HGB, HCT, PLT, HGBEXT, HCTEXT, PLTEXT, HGBEXT, HCTEXT, PLTEXT in the last 72 hours.   Recent Labs     02/28/21  0414 02/28/21  0120 02/27/21  1230 02/27/21  0440    141  --  147*   K 3.4* 3.4*  --  3.4*   * 114*  --  118*   CO2 23 22  --  22   BUN 48* 47*  --  59*   CREA 1.63* 1.65*  --  1.94*   * 291*  --  222*   CA 7.6* 7.6*  --  8.0*   MG  --   --  1.4*  --      Recent Labs     02/28/21  0120 02/27/21  0440   * 121*   * 115   TBILI 0.5 0.4   TP 5.7* 5.6*   ALB 1.8* 1.8*   GLOB 3.9 3.8     No results for input(s): INR, PTP, APTT, INREXT, INREXT in the last 72 hours. No results for input(s): FE, TIBC, PSAT, FERR in the last 72 hours.    No results found for: FOL, RBCF   Recent Labs     02/28/21  0415   PH 7.46*   PCO2 28*   PO2 48*     Recent Labs     02/28/21  0120 02/27/21  2230 02/27/21  1230   TROIQ <0.05 <0.05 <0.05     Lab Results   Component Value Date/Time    Cholesterol, total 185 02/05/2021 01:37 PM    HDL Cholesterol 40 02/05/2021 01:37 PM    LDL, calculated 126.2 (H) 02/05/2021 01:37 PM    Triglyceride 94 02/05/2021 01:37 PM    CHOL/HDL Ratio 4.6 02/05/2021 01:37 PM     Lab Results   Component Value Date/Time    Glucose (POC) 265 (H) 02/28/2021 11:51 AM    Glucose (POC) 283 (H) 02/27/2021 11:35 PM    Glucose (POC) 328 (H) 02/27/2021 05:41 PM    Glucose (POC) 256 (H) 02/27/2021 11:48 AM    Glucose (POC) 218 (H) 02/26/2021 11:42 PM     Lab Results   Component Value Date/Time    Color [place] 02/21/2021 10:27 AM    Appearance Turbid (A) 02/21/2021 10:27 AM    Specific gravity 1.017 02/21/2021 10:27 AM    pH (UA) 5.0 02/21/2021 10:27 AM    Protein 100 (A) 02/21/2021 10:27 AM    Glucose Negative 02/21/2021 10:27 AM    Ketone Negative 02/21/2021 10:27 AM    Bilirubin Negative 02/21/2021 10:27 AM    Urobilinogen 0.1 02/21/2021 10:27 AM    Nitrites Negative 02/21/2021 10:27 AM    Leukocyte Esterase Large (A) 02/21/2021 10:27 AM    Bacteria 4+ (A) 02/21/2021 10:27 AM    Bacteria 4+ (A) 02/21/2021 10:27 AM    WBC >100 (H) 02/21/2021 10:27 AM    WBC >100 (H) 02/21/2021 10:27 AM    RBC 10-20 02/21/2021 10:27 AM    RBC 10-20 02/21/2021 10:27 AM         Assessment:     Sepsis with hypotension  Acute metabolic encephalopathy  Acute hypoxemic respiratory failure left-sided infiltrate  Acute kidney injury  Nonsustained V.  Tach  Hypokalemia  Severe malnutrition    Plan:     On IV Zithromax and IV Zosyn  On IV Decadron  On IV vancomycin  Patient is DNR  Order albumin    Monitor renal function  Replace potassium  Cont  PPI  Discussed with the patient's family updated patient condition      Time spent 30 minutes        Current Facility-Administered Medications:     metoprolol (LOPRESSOR) injection 5 mg, 5 mg, IntraVENous, Q6H, Richa Krishnamurthy MD    furosemide (LASIX) injection 20 mg, 20 mg, IntraVENous, BID, Joselito Haile MD    hydrOXYzine (VISTARIL) injection 25 mg, 25 mg, IntraMUSCular, Q6H PRN, Dayne Aquino DO, 25 mg at 02/27/21 1306    fat emulsion 20% (LIPOSYN, INTRAlipid) infusion 250 mL, 250 mL, IntraVENous, CONTINUOUS, Henrique Valadez MD, Last Rate: 20.83 mL/hr at 02/27/21 2209, 250 mL at 02/27/21 2209    amino acid 4.25 % dextrose 5 % with electrolytes (CLINIMIX E) infusion, , IntraVENous, CONTINUOUS, Henrique Valadez MD, Last Rate: 40 mL/hr at 02/27/21 2207, New Bag at 02/27/21 2207    insulin lispro (HUMALOG) injection, , SubCUTAneous, Q6H, Candelario Alvarez MD, 6 Units at 02/28/21 1200    dexamethasone (DECADRON) 4 mg/mL injection 4 mg, 4 mg, IntraVENous, Q12H, Joselito Haile MD, 4 mg at 02/28/21 0931    dilTIAZem (CARDIZEM) 125 mg/125 mL (1 mg/mL) dextrose 5% infusion, 0-15 mg/hr, IntraVENous, TITRATE, Matthew He MD, Last Rate: 5 mL/hr at 02/27/21 1542, 5 mg/hr at 02/27/21 1542    dextrose (D50W) injection syrg 12.5 g, 25 mL, IntraVENous, PRN, Candelario Alvarez MD, 12.5 g at 02/24/21 0957    NOREPINephrine (LEVOPHED) 8 mg in 5% dextrose 250mL (32 mcg/mL) infusion, 0.5-16 mcg/min, IntraVENous, TITRATE, Candelario Alvarez MD, Stopped at 02/22/21 0800    famotidine (PF) (PEPCID) 20 mg in 0.9% sodium chloride 10 mL injection, 20 mg, IntraVENous, DAILY, Joselito Haile MD, 20 mg at 02/28/21 0931    Vancomycin dose per pharmacy protocol, , Other, Rx Dosing/Monitoring, Milly Stuart MD    midodrine (PROAMATINE) tablet 5 mg, 5 mg, Oral, TID PRN, Caridad Bazan MD    sodium chloride (NS) flush 5-40 mL, 5-40 mL, IntraVENous, Q8H, Candelario Alvarez MD, 10 mL at 02/28/21 0600    sodium chloride (NS) flush 5-40 mL, 5-40 mL, IntraVENous, PRN, Candelario Herrera MD    azithromycin (ZITHROMAX) 500 mg in 0.9% sodium chloride 250 mL (VIAL-MATE), 500 mg, IntraVENous, DAILY, Candelario Alvarez MD, 500 mg at 02/28/21 0931    heparin (porcine) injection 5,000 Units, 5,000 Units, SubCUTAneous, Q12H, Candelario Alvarez MD, 5,000 Units at 02/28/21 0354    glucose chewable tablet 16 g, 4 Tab, Oral, PRN, Sushant LENZ MD    dextrose (D50W) injection syrg 12.5-25 g, 25-50 mL, IntraVENous, PRN, Candelario Herrera MD    glucagon (GLUCAGEN) injection 1 mg, 1 mg, IntraMUSCular, PRN, Candelario Herrera MD    piperacillin-tazobactam (ZOSYN) 3.375 g in 0.9% sodium chloride (MBP/ADV) 100 mL MBP, 3.375 g, IntraVENous, Q12H, Milly Stuart MD, Last Rate: 200 mL/hr at 02/28/21 1152, 3.375 g at 02/28/21 1152

## 2021-02-28 NOTE — PROGRESS NOTES
Attempted to see pt for OT eval; pt continues to be unarousable to stimuli. Will continue to follow and attempt eval at later time. Thank you.

## 2021-02-28 NOTE — PROGRESS NOTES
Attempted to see pt for PT eval; pt continues to be unarousable to stimuli. Will continue to follow and attempt eval at later time. Thank you.

## 2021-02-28 NOTE — PROGRESS NOTES
1142hrs-Dr. Gisel Rabago made aware that patient has worsening weeping edema upper extremities bilaterally. 1448hrs-Dr. Manda Buchanan contacted patient's heart rate dipped down in the 50's, pt remains asymptomatic, heart rate came back up to the 70s, pt is in Afib. New orders received to discontinue cardizem drip per Echo patient has Mildy reduced systolic function. 1722hrs-Dr. Gisel Rabago called in regards to lasix orders, patient has already received one dose of 20mg lasix, clarifying the orders. No answer message left will call back.

## 2021-02-28 NOTE — PROGRESS NOTES
Consult for Vancomycin Dosing by Pharmacy by Dr. Eddie Cooney provided for this 80y.o. year old male , for indication of sepsis. Day of Therapy: 7  Goal of Level(s): 15-20mcg/dL    Other Current Antibiotics: Zosyn, azithromycin    Significant Cultures:       Date                             Culture                                       Organism      2/21                              Blood x2                                    GPC      2/21                              Urine                                          Klebsiella, Enterococcus faecalis    Serum Creatinine Creatinine   Date Value Ref Range Status   02/28/2021 1.63 (H) 0.70 - 1.30 mg/dL Final   02/28/2021 1.65 (H) 0.70 - 1.30 mg/dL Final   02/27/2021 1.94 (H) 0.70 - 1.30 mg/dL Final     Comment:     DELTA      Creatinine Clearance Estimated Creatinine Clearance: 35.7 mL/min (A) (based on SCr of 1.63 mg/dL (H)). BUN Lab Results   Component Value Date/Time    BUN 48 (H) 02/28/2021 04:14 AM      WBC Lab Results   Component Value Date/Time    WBC 9.3 02/25/2021 04:30 AM      Temp 97.5 °F (36.4 °C)     Last Level:    Vancomycin, random   Date Value Ref Range Status   02/28/2021 17.6 ug/mL Final     Comment:     No reference range has been established. Ht Readings from Last 1 Encounters:   02/24/21 182.9 cm (72\")        Wt Readings from Last 1 Encounters:   02/28/21 80.3 kg (177 lb 0.5 oz)     Ideal body weight: 77.6 kg (171 lb 1.2 oz)  Adjusted ideal body weight: 78.7 kg (173 lb 7.3 oz)     Previous Regimen: 1000mg IV x1 (2/26)    New Regimen:   Patient NATALI improving, with creatinine of 1.63 today. Give Vancomycin 1000mg IV x 1 at 20:00. Vancomycin random level due 3/1 at 16:00. Pharmacy to follow daily and will make changes to dose and/or frequency based on clinical status.   _________________________________     Pharmacist BEVERLEY [facility]

## 2021-02-28 NOTE — PROGRESS NOTES
Progress Note      2/28/2021 5:56 AM  NAME: Maura Garibay   MRN:  [id number]   Ad[facility] [A41.9]  Pneumonia [J18.9]  Renal failure [N19]      Problem List:   Cardiology cross cover progress note-[facility] Radha Chowdhury MD)     1. Incomplete database secondary to the patient being obtunded/altered mental status  2. COVID-19 disease  3. Covid pneumonitis  4. Aspiration pneumonia  5. Urinary tract infection  6. Severe sepsis  7. Septic shock initially requiring inotropic support  8. Poor nutrition  9. Consideration for percutaneous endoscopic gastrostomy  (PEG) tube placement  10. Previous transient ischemic attack (TIA)     11. Cardiac dysrhythmia        11a. Atrial fibrillation        11b. Nonsustained ventricular tachycardia (NSVT)  12. Hypertension  13. Dyslipidemia  14. Hypothyroidism  15. Acute kidney injury  16. Spinal stenosis  17. Polycythemia vera  18. Thrombocytopenia       Subjective: The patient is seen and examined in room 280. There were no acute cardiovascular events reported overnight. The patient did have another run of nonsustained ventricular tachycardia (9 consecutive premature ventricular complex). The patient remains on IV diltiazem for atrial fibrillation. Add IV metoprolol to suppress ventricular ectopy. Discussed with RN events overnight.      Medications Personally Reviewed:    Current Facility-Administered Medications   Medication Dose Route Frequency    hydrOXYzine (VISTARIL) injection 25 mg  25 mg IntraMUSCular Q6H PRN    fat emulsion 20% (LIPOSYN, INTRAlipid) infusion 250 mL  250 mL IntraVENous CONTINUOUS    amino acid 4.25 % dextrose 5 % with electrolytes (CLINIMIX E) infusion   IntraVENous CONTINUOUS    dextrose 5% 1,000 mL with potassium chloride 30 mEq, sodium bicarbonate (8.4%) 50 mEq infusion   IntraVENous CONTINUOUS    VANCOMYCIN RANDOM LAB REMINDER   Other ONCE    insulin lispro (HUMALOG) injection SubCUTAneous Q6H    dexamethasone (DECADRON) 4 mg/mL injection 4 mg  4 mg IntraVENous Q12H    dilTIAZem (CARDIZEM) 125 mg/125 mL (1 mg/mL) dextrose 5% infusion  0-15 mg/hr IntraVENous TITRATE    dextrose (D50W) injection syrg 12.5 g  25 mL IntraVENous PRN    NOREPINephrine (LEVOPHED) 8 mg in 5% dextrose 250mL (32 mcg/mL) infusion  0.5-16 mcg/min IntraVENous TITRATE    famotidine (PF) (PEPCID) 20 mg in 0.9% sodium chloride 10 mL injection  20 mg IntraVENous DAILY    Vancomycin dose per pharmacy protocol   Other Rx Dosing/Monitoring    midodrine (PROAMATINE) tablet 5 mg  5 mg Oral TID PRN    sodium chloride (NS) flush 5-40 mL  5-40 mL IntraVENous Q8H    sodium chloride (NS) flush 5-40 mL  5-40 mL IntraVENous PRN    azithromycin (ZITHROMAX) 500 mg in 0.9% sodium chloride 250 mL (VIAL-MATE)  500 mg IntraVENous DAILY    heparin (porcine) injection 5,000 Units  5,000 Units SubCUTAneous Q12H    glucose chewable tablet 16 g  4 Tab Oral PRN    dextrose (D50W) injection syrg 12.5-25 g  25-50 mL IntraVENous PRN    glucagon (GLUCAGEN) injection 1 mg  1 mg IntraMUSCular PRN    piperacillin-tazobactam (ZOSYN) 3.375 g in 0.9% sodium chloride (MBP/ADV) 100 mL MBP  3.375 g IntraVENous Q12H           Objective:      Physical Exam:  Last 24hrs VS reviewed since prior progress note.  Most recent are:    Visit Vitals  BP (!) 126/90 (BP 1 Location: Left lower arm, BP Patient Position: At rest)   Pulse 84   Temp 96.8 °F (36 °C)   Resp 17   Ht 6' (1.829 m)   Wt 80.3 kg (177 lb 0.5 oz)   SpO2 94%   BMI 24.01 kg/m²       Intake/Output Summary (Last 24 hours) at 2/28/2021 0556  Last data filed at 2/27/2021 2300  Gross per 24 hour   Intake    Output 1600 ml   Net -1600 ml        Physical exam: Essentially unchanged    Data Review    Telemetry: Sinus rhythm without ventricular ectopy    EKG:   []  No new EKG for review    Lab Data Personally Reviewed:    No results for input(s): WBC, HGB, HCT, PLT, HGBEXT, HCTEXT, PLTEXT in the last 72 hours. No results for input(s): INR, PTP, APTT, INREXT in the last 72 hours. Recent Labs     02/28/21  0120 02/27/21  1230 02/27/21  0440 02/26/21  0450     --  147*  --    K 3.4*  --  3.4*  --    *  --  118*  --    CO2 22  --  22  --    BUN 47*  --  59*  --    CREA 1.65*  --  1.94* 2.50*   *  --  222*  --    CA 7.6*  --  8.0*  --    MG  --  1.4*  --   --      Recent Labs     02/28/21  0120 02/27/21  2230 02/27/21  1230   TROIQ <0.05 <0.05 <0.05     Lab Results   Component Value Date/Time    Cholesterol, total 185 02/05/2021 01:37 PM    HDL Cholesterol 40 02/05/2021 01:37 PM    LDL, calculated 126.2 (H) 02/05/2021 01:37 PM    Triglyceride 94 02/05/2021 01:37 PM    CHOL/HDL Ratio 4.6 02/05/2021 01:37 PM       Recent Labs     02/28/21  0120 02/27/21  0440   * 115   TP 5.7* 5.6*   ALB 1.8* 1.8*   GLOB 3.9 3.8     No results for input(s): PH, PCO2, PO2 in the last 72 hours. Assessment/Plan:   1. Continue ICU care  2. Continue to monitor serum electrolytes, and renal function (replace, and recheck serum potassium)  3. Continue to monitor fluid balance, and daily weights  4. Continue current cardiovascular medications including IV diltiazem, heparin, and insulin  5.   Add IV metoprolol every 6 hours for suppression of ventricular ectopy     Kye Licea MD

## 2021-03-01 ENCOUNTER — APPOINTMENT (OUTPATIENT)
Dept: GENERAL RADIOLOGY | Age: 86
DRG: 871 | End: 2021-03-01
Attending: FAMILY MEDICINE
Payer: MEDICARE

## 2021-03-01 LAB
ALBUMIN SERPL-MCNC: 2.3 G/DL (ref 3.5–5)
ALBUMIN/GLOB SERPL: 0.6 {RATIO} (ref 1.1–2.2)
ALP SERPL-CCNC: 123 U/L (ref 45–117)
ALT SERPL-CCNC: 95 U/L (ref 12–78)
ANION GAP SERPL CALC-SCNC: 4 MMOL/L (ref 5–15)
ARTERIAL PATENCY WRIST A: ABNORMAL
AST SERPL W P-5'-P-CCNC: 35 U/L (ref 15–37)
BASE EXCESS BLDA CALC-SCNC: 2.7 MMOL/L (ref 0–2)
BASOPHILS # BLD: 0 K/UL (ref 0–0.1)
BASOPHILS NFR BLD: 0 % (ref 0–1)
BDY SITE: ABNORMAL
BILIRUB SERPL-MCNC: 0.7 MG/DL (ref 0.2–1)
BUN SERPL-MCNC: 53 MG/DL (ref 6–20)
BUN/CREAT SERPL: 32 (ref 12–20)
CA-I BLD-MCNC: 8.3 MG/DL (ref 8.5–10.1)
CHLORIDE SERPL-SCNC: 112 MMOL/L (ref 97–108)
CO2 SERPL-SCNC: 29 MMOL/L (ref 21–32)
CREAT SERPL-MCNC: 1.67 MG/DL (ref 0.7–1.3)
DIFFERENTIAL METHOD BLD: ABNORMAL
EOSINOPHIL # BLD: 0 K/UL (ref 0–0.4)
EOSINOPHIL NFR BLD: 0 % (ref 0–7)
ERYTHROCYTE [DISTWIDTH] IN BLOOD BY AUTOMATED COUNT: 14.4 % (ref 11.5–14.5)
GAS FLOW.O2 O2 DELIVERY SYS: 3 L/MIN
GLOBULIN SER CALC-MCNC: 3.7 G/DL (ref 2–4)
GLUCOSE BLD STRIP.AUTO-MCNC: 182 MG/DL (ref 65–100)
GLUCOSE BLD STRIP.AUTO-MCNC: 247 MG/DL (ref 65–100)
GLUCOSE BLD STRIP.AUTO-MCNC: 258 MG/DL (ref 65–100)
GLUCOSE BLD STRIP.AUTO-MCNC: 265 MG/DL (ref 65–100)
GLUCOSE SERPL-MCNC: 264 MG/DL (ref 65–100)
HCO3 BLDA-SCNC: 26 MMOL/L (ref 22–26)
HCT VFR BLD AUTO: 31.3 % (ref 36.6–50.3)
HGB BLD-MCNC: 10.6 G/DL (ref 12.1–17)
IMM GRANULOCYTES # BLD AUTO: 0.1 K/UL (ref 0–0.04)
IMM GRANULOCYTES NFR BLD AUTO: 1 % (ref 0–0.5)
LYMPHOCYTES # BLD: 0.4 K/UL (ref 0.8–3.5)
LYMPHOCYTES NFR BLD: 3 % (ref 12–49)
MAGNESIUM SERPL-MCNC: 1.7 MG/DL (ref 1.6–2.4)
MCH RBC QN AUTO: 40.9 PG (ref 26–34)
MCHC RBC AUTO-ENTMCNC: 33.9 G/DL (ref 30–36.5)
MCV RBC AUTO: 120.8 FL (ref 80–99)
MONOCYTES # BLD: 0.3 K/UL (ref 0–1)
MONOCYTES NFR BLD: 3 % (ref 5–13)
NEUTS SEG # BLD: 11.3 K/UL (ref 1.8–8)
NEUTS SEG NFR BLD: 93 % (ref 32–75)
NRBC # BLD: 0.21 K/UL (ref 0–0.01)
NRBC BLD-RTO: 1.8 PER 100 WBC
PCO2 BLDA: 32 MMHG (ref 35–45)
PERFORMED BY, TECHID: ABNORMAL
PH BLDA: 7.51 [PH] (ref 7.35–7.45)
PLATELET # BLD AUTO: 146 K/UL (ref 150–400)
PMV BLD AUTO: 13.6 FL (ref 8.9–12.9)
PO2 BLDA: 43 MMHG (ref 75–100)
POTASSIUM SERPL-SCNC: 3.3 MMOL/L (ref 3.5–5.1)
PROT SERPL-MCNC: 6 G/DL (ref 6.4–8.2)
RBC # BLD AUTO: 2.59 M/UL (ref 4.1–5.7)
SAO2 % BLD: 82 %
SAO2% DEVICE SAO2% SENSOR NAME: ABNORMAL
SERVICE CMNT-IMP: ABNORMAL
SODIUM SERPL-SCNC: 145 MMOL/L (ref 136–145)
WBC # BLD AUTO: 12 K/UL (ref 4.1–11.1)

## 2021-03-01 PROCEDURE — 74011250636 HC RX REV CODE- 250/636: Performed by: INTERNAL MEDICINE

## 2021-03-01 PROCEDURE — 74011636637 HC RX REV CODE- 636/637: Performed by: INTERNAL MEDICINE

## 2021-03-01 PROCEDURE — 85025 COMPLETE CBC W/AUTO DIFF WBC: CPT

## 2021-03-01 PROCEDURE — 74011250636 HC RX REV CODE- 250/636: Performed by: LEGAL MEDICINE

## 2021-03-01 PROCEDURE — 36415 COLL VENOUS BLD VENIPUNCTURE: CPT

## 2021-03-01 PROCEDURE — 74011000258 HC RX REV CODE- 258: Performed by: INTERNAL MEDICINE

## 2021-03-01 PROCEDURE — 82803 BLOOD GASES ANY COMBINATION: CPT

## 2021-03-01 PROCEDURE — 65610000006 HC RM INTENSIVE CARE

## 2021-03-01 PROCEDURE — 77010033678 HC OXYGEN DAILY

## 2021-03-01 PROCEDURE — 80053 COMPREHEN METABOLIC PANEL: CPT

## 2021-03-01 PROCEDURE — 83735 ASSAY OF MAGNESIUM: CPT

## 2021-03-01 PROCEDURE — 74011000250 HC RX REV CODE- 250: Performed by: INTERNAL MEDICINE

## 2021-03-01 PROCEDURE — 82962 GLUCOSE BLOOD TEST: CPT

## 2021-03-01 PROCEDURE — 99232 SBSQ HOSP IP/OBS MODERATE 35: CPT | Performed by: INTERNAL MEDICINE

## 2021-03-01 PROCEDURE — 71045 X-RAY EXAM CHEST 1 VIEW: CPT

## 2021-03-01 RX ORDER — POTASSIUM CHLORIDE 7.45 MG/ML
10 INJECTION INTRAVENOUS
Status: COMPLETED | OUTPATIENT
Start: 2021-03-01 | End: 2021-03-01

## 2021-03-01 RX ADMIN — METOPROLOL TARTRATE 5 MG: 5 INJECTION INTRAVENOUS at 06:00

## 2021-03-01 RX ADMIN — FAMOTIDINE 20 MG: 10 INJECTION INTRAVENOUS at 10:22

## 2021-03-01 RX ADMIN — HYDROXYZINE HYDROCHLORIDE 25 MG: 50 INJECTION, SOLUTION INTRAMUSCULAR at 10:22

## 2021-03-01 RX ADMIN — HEPARIN SODIUM 5000 UNITS: 5000 INJECTION INTRAVENOUS; SUBCUTANEOUS at 16:16

## 2021-03-01 RX ADMIN — HEPARIN SODIUM 5000 UNITS: 5000 INJECTION INTRAVENOUS; SUBCUTANEOUS at 04:41

## 2021-03-01 RX ADMIN — METOPROLOL TARTRATE 5 MG: 5 INJECTION INTRAVENOUS at 12:32

## 2021-03-01 RX ADMIN — INSULIN LISPRO 6 UNITS: 100 INJECTION, SOLUTION INTRAVENOUS; SUBCUTANEOUS at 06:04

## 2021-03-01 RX ADMIN — PIPERACILLIN AND TAZOBACTAM 3.38 G: 3; .375 INJECTION, POWDER, LYOPHILIZED, FOR SOLUTION INTRAVENOUS at 00:48

## 2021-03-01 RX ADMIN — METOPROLOL TARTRATE 5 MG: 5 INJECTION INTRAVENOUS at 00:49

## 2021-03-01 RX ADMIN — METOPROLOL TARTRATE 5 MG: 5 INJECTION INTRAVENOUS at 18:09

## 2021-03-01 RX ADMIN — POTASSIUM CHLORIDE 10 MEQ: 7.46 INJECTION, SOLUTION INTRAVENOUS at 13:57

## 2021-03-01 RX ADMIN — POTASSIUM CHLORIDE 10 MEQ: 7.46 INJECTION, SOLUTION INTRAVENOUS at 12:38

## 2021-03-01 RX ADMIN — ASCORBIC ACID, VITAMIN A PALMITATE, CHOLECALCIFEROL, THIAMINE HYDROCHLORIDE, RIBOFLAVIN-5 PHOSPHATE SODIUM, PYRIDOXINE HYDROCHLORIDE, NIACINAMIDE, DEXPANTHENOL, ALPHA-TOCOPHEROL ACETATE, VITAMIN K1, FOLIC ACID, BIOTIN, CYANOCOBALAMIN: 200; 3300; 200; 6; 3.6; 6; 40; 15; 10; 150; 600; 60; 5 INJECTION, SOLUTION INTRAVENOUS at 21:20

## 2021-03-01 RX ADMIN — DEXAMETHASONE SODIUM PHOSPHATE 4 MG: 4 INJECTION, SOLUTION INTRA-ARTICULAR; INTRALESIONAL; INTRAMUSCULAR; INTRAVENOUS; SOFT TISSUE at 21:20

## 2021-03-01 RX ADMIN — HYDROXYZINE HYDROCHLORIDE 25 MG: 50 INJECTION, SOLUTION INTRAMUSCULAR at 00:52

## 2021-03-01 RX ADMIN — Medication 30 ML: at 14:00

## 2021-03-01 RX ADMIN — INSULIN LISPRO 4 UNITS: 100 INJECTION, SOLUTION INTRAVENOUS; SUBCUTANEOUS at 12:35

## 2021-03-01 RX ADMIN — Medication 10 ML: at 21:33

## 2021-03-01 RX ADMIN — FUROSEMIDE 40 MG: 10 INJECTION, SOLUTION INTRAMUSCULAR; INTRAVENOUS at 10:22

## 2021-03-01 RX ADMIN — INSULIN LISPRO 6 UNITS: 100 INJECTION, SOLUTION INTRAVENOUS; SUBCUTANEOUS at 18:12

## 2021-03-01 RX ADMIN — PIPERACILLIN AND TAZOBACTAM 3.38 G: 3; .375 INJECTION, POWDER, LYOPHILIZED, FOR SOLUTION INTRAVENOUS at 13:46

## 2021-03-01 RX ADMIN — FUROSEMIDE 40 MG: 10 INJECTION, SOLUTION INTRAMUSCULAR; INTRAVENOUS at 21:20

## 2021-03-01 RX ADMIN — INSULIN LISPRO 2 UNITS: 100 INJECTION, SOLUTION INTRAVENOUS; SUBCUTANEOUS at 00:49

## 2021-03-01 RX ADMIN — DEXAMETHASONE SODIUM PHOSPHATE 4 MG: 4 INJECTION, SOLUTION INTRA-ARTICULAR; INTRALESIONAL; INTRAMUSCULAR; INTRAVENOUS; SOFT TISSUE at 10:22

## 2021-03-01 NOTE — PROGRESS NOTES
Progress Note    Patient: Louise Parker MRN: [id number]  SSN: [id number]    YOB: 1934  Age: 80 y.o. Sex: male      Admit Date: 2/21/2021    LOS: 8 days     Subjective:     Patient seen in ICU, minimally arousable. He is on O2 3 liters. PEG tube placement is pending. Patient on PPN. -Patient is admitted to the hospital for hypotension and altered mental status. He is Covid positive with severe sepsis. DNR status.  -Edema to his right arm, possible DVT. Duplex ultrasound Negative for DVT. Objective:     Vitals:    03/01/21 1410 03/01/21 1500 03/01/21 1600 03/01/21 1809   BP:  106/72 129/86 (!) 118/92   Pulse:  80 78 82   Resp:  30 (!) 31    Temp:       SpO2: 99% 99% 98%    Weight:       Height:            Intake and Output:  Current Shift: 03/01 0701 - 03/01 1900  In: -   Out: 900 [Urine:900]  Last three shifts: 02/27 1901 - 03/01 0700  In: 1025 [I.V.:1025]  Out: 4850 [Urine:4850]    Physical Exam:   Skin:  Extremities and face reveal no rashes. No vanegas erythema. HEENT: Sclerae anicteric. Extra-occular muscles are intact. No abnormal pigmentation of the lips. The neck is supple. Cardiovascular: Regular rate and rhythm. Respiratory:  Comfortable breathing with no accessory muscle use. GI:  Abdomen nondistended, soft, and nontender. No enlargement of the liver or spleen. No masses palpable. Rectal:  Deferred  Musculoskeletal: Extremities have good range of motion. Neurological:  Lethargic. Psychiatric: Unable to assess.   Lymphatic:  No visible adenopathy      Lab/Data Review:  Recent Results (from the past 24 hour(s))   GLUCOSE, POC    Collection Time: 03/01/21 12:15 AM   Result Value Ref Range    Glucose (POC) 182 (H) 65 - 100 mg/dL    Performed by Rosenda HORTA    CBC WITH AUTOMATED DIFF    Collection Time: 03/01/21  5:00 AM   Result Value Ref Range    WBC 12.0 (H) 4.1 - 11.1 K/uL    RBC 2.59 (L) 4.10 - 5.70 M/uL    HGB 10.6 (L) 12.1 - 17.0 g/dL    HCT 31.3 (L) 36.6 - 50.3 %    .8 (H) 80.0 - 99.0 FL    MCH 40.9 (H) 26.0 - 34.0 PG    MCHC 33.9 30.0 - 36.5 g/dL    RDW 14.4 11.5 - 14.5 %    PLATELET 052 (L) 758 - 400 K/uL    MPV 13.6 (H) 8.9 - 12.9 FL    NRBC 1.8 (H) 0  WBC    ABSOLUTE NRBC 0.21 (H) 0.00 - 0.01 K/uL    NEUTROPHILS 93 (H) 32 - 75 %    LYMPHOCYTES 3 (L) 12 - 49 %    MONOCYTES 3 (L) 5 - 13 %    EOSINOPHILS 0 0 - 7 %    BASOPHILS 0 0 - 1 %    IMMATURE GRANULOCYTES 1 (H) 0.0 - 0.5 %    ABS. NEUTROPHILS 11.3 (H) 1.8 - 8.0 K/UL    ABS. LYMPHOCYTES 0.4 (L) 0.8 - 3.5 K/UL    ABS. MONOCYTES 0.3 0.0 - 1.0 K/UL    ABS. EOSINOPHILS 0.0 0.0 - 0.4 K/UL    ABS. BASOPHILS 0.0 0.0 - 0.1 K/UL    ABS. IMM. GRANS. 0.1 (H) 0.00 - 0.04 K/UL    DF AUTOMATED     METABOLIC PANEL, COMPREHENSIVE    Collection Time: 03/01/21  5:00 AM   Result Value Ref Range    Sodium 145 136 - 145 mmol/L    Potassium 3.3 (L) 3.5 - 5.1 mmol/L    Chloride 112 (H) 97 - 108 mmol/L    CO2 29 21 - 32 mmol/L    Anion gap 4 (L) 5 - 15 mmol/L    Glucose 264 (H) 65 - 100 mg/dL    BUN 53 (H) 6 - 20 mg/dL    Creatinine 1.67 (H) 0.70 - 1.30 mg/dL    BUN/Creatinine ratio 32 (H) 12 - 20      GFR est AA 48 (L) >60 ml/min/1.73m2    GFR est non-AA 39 (L) >60 ml/min/1.73m2    Calcium 8.3 (L) 8.5 - 10.1 mg/dL    Bilirubin, total 0.7 0.2 - 1.0 mg/dL    AST (SGOT) 35 15 - 37 U/L    ALT (SGPT) 95 (H) 12 - 78 U/L    Alk.  phosphatase 123 (H) 45 - 117 U/L    Protein, total 6.0 (L) 6.4 - 8.2 g/dL    Albumin 2.3 (L) 3.5 - 5.0 g/dL    Globulin 3.7 2.0 - 4.0 g/dL    A-G Ratio 0.6 (L) 1.1 - 2.2     GLUCOSE, POC    Collection Time: 03/01/21 11:47 AM   Result Value Ref Range    Glucose (POC) 247 (H) 65 - 100 mg/dL    Performed by Kana Jaramillo, ARTERIAL    Collection Time: 03/01/21  1:30 PM   Result Value Ref Range    pH 7.51 (H) 7.35 - 7.45      PCO2 32 (L) 35 - 45 mmHg    PO2 43 (LL) 75 - 100 mmHg    O2 SAT 82 (LL) >95 %    BICARBONATE 26 22 - 26 mmol/L    BASE EXCESS 2.7 (H) 0 - 2 mmol/L    O2 METHOD Nasal Cannula      O2 FLOW RATE 3 L/min    SITE Left Radial      VIGNESH'S TEST PASS      Critical value read back ESSENCE,RN               XR CHEST PORT   Final Result   Diffuse airspace opacities in the lungs, possibly improved on the   right. XR CHEST PORT   Final Result   No significant change. XR CHEST PORT   Final Result      XR CHEST PORT   Final Result   Gastric tube unchanged from the prior exam and should be advanced 8   cm for optimal positioning. Persistent interstitial infiltrates, unchanged. Please see full report. I called the CCU with this result and recommendation at   4:31 PM. I spoke with John Hurley. She states the NG tube appears to be at its   distal aspect outside the patient. I suspect it is coiled within the throat as   the NG tube position appears unchanged from the earlier exam.      XR CHEST PORT   Final Result   Feeding tube tip at the GE junction. This should be advanced into   the stomach. Airspace opacities in the lungs, left greater than right, not   significantly changed. XR CHEST PORT   Final Result      US RETROPERITONEUM COMP   Final Result   Small right renal cyst. Mild left hydronephrosis of unclear   etiology. Gonzales catheter in the bladder. Please see full report. XR CHEST PORT   Final Result   New left lung opacities, nonspecific, but could represent an infectious process   in the appropriate clinical setting. Asymmetric edema could appear similar,   though is considered less likely. Assessment:     Active Problems:    Pneumonia (2/21/2021)      Renal failure (2/21/2021)      Sepsis ([address] (2/21/2021)        Plan:   Continue PPN  Continue current plan of care. Patient discussed with Dr Kinjal Queen and agrees to above impression and plan. Thank you for allowing me to participate in this patients care    Signed By: Wade Figueroa.  SUZI Pillai     March 1, 2021

## 2021-03-01 NOTE — PROGRESS NOTES
OT eval order received and acknowledged. OT eval attempted at 1445 however pt not following commands and remains minimally arouseable to voice and sternal rub (opening eyes but not focusing on OT and then quickly closing them). This is OT's 4th attempt on pt over the past 5 days, pt remains inappropriate for skilled services at this time. OT orders will be discontinued, please reorder if status improves and pt is able to participate, CM made aware. Thank you.

## 2021-03-01 NOTE — PROGRESS NOTES
Renal Daily Progress Note    Admit Date: 2/21/2021      Subjective: Intake 1025 output documented at 3600 mL. He is on TPN. He does not answer questions. Magnesium was low yesterday at 1.4 and was replaced intravenously.       Current Facility-Administered Medications   Medication Dose Route Frequency    metoprolol (LOPRESSOR) injection 5 mg  5 mg IntraVENous Q6H    amino acid 4.25 % dextrose 5 % with electrolytes (CLINIMIX E) infusion   IntraVENous CONTINUOUS    bisacodyL (DULCOLAX) suppository 10 mg  10 mg Rectal DAILY PRN    furosemide (LASIX) injection 40 mg  40 mg IntraVENous BID    midodrine (PROAMATINE) tablet 5 mg  5 mg Oral QID    hydrOXYzine (VISTARIL) injection 25 mg  25 mg IntraMUSCular Q6H PRN    insulin lispro (HUMALOG) injection   SubCUTAneous Q6H    dexamethasone (DECADRON) 4 mg/mL injection 4 mg  4 mg IntraVENous Q12H    dextrose (D50W) injection syrg 12.5 g  25 mL IntraVENous PRN    NOREPINephrine (LEVOPHED) 8 mg in 5% dextrose 250mL (32 mcg/mL) infusion  0.5-16 mcg/min IntraVENous TITRATE    famotidine (PF) (PEPCID) 20 mg in 0.9% sodium chloride 10 mL injection  20 mg IntraVENous DAILY    sodium chloride (NS) flush 5-40 mL  5-40 mL IntraVENous Q8H    sodium chloride (NS) flush 5-40 mL  5-40 mL IntraVENous PRN    azithromycin (ZITHROMAX) 500 mg in 0.9% sodium chloride 250 mL (VIAL-MATE)  500 mg IntraVENous DAILY    heparin (porcine) injection 5,000 Units  5,000 Units SubCUTAneous Q12H    glucose chewable tablet 16 g  4 Tab Oral PRN    dextrose (D50W) injection syrg 12.5-25 g  25-50 mL IntraVENous PRN    glucagon (GLUCAGEN) injection 1 mg  1 mg IntraMUSCular PRN    piperacillin-tazobactam (ZOSYN) 3.375 g in 0.9% sodium chloride (MBP/ADV) 100 mL MBP  3.375 g IntraVENous Q12H        Review of Systems    ROS   Not obtainable    Objective:     Patient Vitals for the past 8 hrs:   BP Temp Pulse Resp SpO2 Weight   03/01/21 1022 124/78  80      03/01/21 1005     96 %  03/01/21 1000  97.8 °F (36.6 °C)       03/01/21 0700  98.4 °F (36.9 °C)       03/01/21 0600 119/79  88 (!) 32 93 %    03/01/21 0500 119/79  88 (!) 33 93 %    03/01/21 0400 115/75  90 (!) 37 95 % 78.3 kg (172 lb 9.9 oz)     03/01 0701 - 03/01 1900  In: -   Out: 450 [Urine:450]  02/27 1901 - 03/01 0700  In: 1025 [I.V.:1025]  Out: 4850 [Urine:4850]    Physical Exam:   Physical Exam   Neck: No JVD present. Cardiovascular: Regular rhythm. Abdominal: Soft. Bowel sounds are normal.   Musculoskeletal:         General: Edema present. Skin: Skin is warm. Has a Gonzales. Lungs: Crackles bibasilar          XR CHEST PORT   Final Result   Diffuse airspace opacities in the lungs, possibly improved on the   right. XR CHEST PORT   Final Result   No significant change. XR CHEST PORT   Final Result      XR CHEST PORT   Final Result   Gastric tube unchanged from the prior exam and should be advanced 8   cm for optimal positioning. Persistent interstitial infiltrates, unchanged. Please see full report. I called the CCU with this result and recommendation at   4:31 PM. I spoke with Ronda Malone. She states the NG tube appears to be at its   distal aspect outside the patient. I suspect it is coiled within the throat as   the NG tube position appears unchanged from the earlier exam.      XR CHEST PORT   Final Result   Feeding tube tip at the GE junction. This should be advanced into   the stomach. Airspace opacities in the lungs, left greater than right, not   significantly changed. XR CHEST PORT   Final Result      US RETROPERITONEUM COMP   Final Result   Small right renal cyst. Mild left hydronephrosis of unclear   etiology. Gonzales catheter in the bladder. Please see full report. XR CHEST PORT   Final Result   New left lung opacities, nonspecific, but could represent an infectious process   in the appropriate clinical setting.  Asymmetric edema could appear similar,   though is considered less likely. Data Review   Recent Labs     03/01/21  0500   WBC 12.0*   HGB 10.6*   HCT 31.3*   *     Recent Labs     03/01/21  0500 02/28/21  0414 02/28/21  0120 02/27/21  1230 02/27/21  0440    144 141  --  147*   K 3.3* 3.4* 3.4*  --  3.4*   * 115* 114*  --  118*   CO2 29 23 22  --  22   * 263* 291*  --  222*   BUN 53* 48* 47*  --  59*   CREA 1.67* 1.63* 1.65*  --  1.94*   CA 8.3* 7.6* 7.6*  --  8.0*   MG  --   --   --  1.4*  --    ALB 2.3*  --  1.8*  --  1.8*   ALT 95*  --  110*  --  121*     No components found for: [place]  Recent Labs     02/28/21  0415   PH 7.46*   PCO2 28*   PO2 48*   HCO3 22   FIO2 21     No results for input(s): INR, INREXT, INREXT in the last 72 hours. Assessment:           Active Problems:    Pneumonia (2/21/2021)      Renal failure (2/21/2021)      Sepsis ([facility] [address] (2/21/2021)    Nonoliguric NATALI. No change in renal function. Hypokalemia replete  Total body water overload  COVID-19 pneumonia  Atrial fibrillation off diltiazem  Hypertension  Diabetes  Plan:     Continue Lasix to keep him nonoliguric. Replace potassium  Repeat a magnesium level.

## 2021-03-01 NOTE — PROGRESS NOTES
Chart reviewed. Patient came from [facility] short term rehab. Wife Janet Guerrier does not want him returning to [facility] if possible, she would like him to return home. Patient has not been appropriate to work with PT/OT yet, but should work with PT/OT once able to help determine safest discharge disposition. CM will continue to follow. Addendum  After discussion with patient's nurse and PT, patient may benefit from a goals of care discussion.

## 2021-03-01 NOTE — PROGRESS NOTES
Pulmonary Progress Note      NAME: Shemar Espinosa   :  1934  MRM:  [id number]    Date/Time: 3/1/2021  11:56 AM         Subjective:     Patient seen and examined in the ICU. Discussed with the RN. Blood gases this morning showed hypoxia. He is on 3 L oxygen. Patient is slightly tachypneic today. Respiratory rate is ranging from 28-35. He also has a abnormal pattern of breathing. Patient is stuprose, not giving any verbal response      He is started on PPN and lipids. He remains confused and obtunded. Speech evaluated him. Alternative means of nutrition was recommended. Unable to pass NG tube. GI is consulted for possible PEG tube. Nephrology on board as well. Started on diltiazem infusion. Cardiology on board. He has a Gonzales catheter and Flexi-Seal.    Past Medical History reviewed and unchanged from Admission History and Physical       Objective:     Physical Exam     Vitals:      Last 24hrs VS reviewed since prior progress note. Most recent are:    Visit Vitals  /78   Pulse 80   Temp 98.2 °F (36.8 °C)   Resp (!) 32   Ht 6' (1.829 m)   Wt 78.3 kg (172 lb 9.9 oz)   SpO2 96%   BMI 23.41 kg/m²     SpO2 Readings from Last 6 Encounters:   21 96%   21 96%    O2 Flow Rate (L/min): 3 l/min       Intake/Output Summary (Last 24 hours) at 3/1/2021 1218  Last data filed at 3/1/2021 1022  Gross per 24 hour   Intake 1025 ml   Output 3850 ml   Net -2825 ml      GENERAL:  The patient is an elderly white male who is somnolent. Tachypneic today . On 3 L oxygen. HEENT:  Shows pupils are equal and reactive to light. No pallor or icterus. Nasal passages are apparently patent. Oropharynx is difficult to evaluate. He will not cooperate with opening his mouth. NECK:  Supple. Trachea is central.  No JVD or lymphadenopathy. He is not using any accessory muscles of respirations. CHEST:  Symmetrical with equal and fair air entry bilaterally.     Unchanged scattered rhonchi are noted.  HEART:  Rhythm is irregular with monitor showing sinus rhythm. No loud murmur or gallop. ABDOMEN:  Soft and appears to be benign. Bowel sounds are audible. He has a Gonzales catheter and a Flexi-Seal in place. EXTREMITIES:  Do not show any cyanosis or clubbing.  +1 pitting edema. Pulses are palpable. However he has much more edema of the right upper extremity. It is basically weeping edema. NEUROLOGIC:  Shows that the patient is moving his extremities but appears to be confused. He has mittens in place. SKIN:  Warm and dry. XR CHEST PORT   Final Result   Diffuse airspace opacities in the lungs, possibly improved on the   right. XR CHEST PORT   Final Result   No significant change. XR CHEST PORT   Final Result      XR CHEST PORT   Final Result   Gastric tube unchanged from the prior exam and should be advanced 8   cm for optimal positioning. Persistent interstitial infiltrates, unchanged. Please see full report. I called the CCU with this result and recommendation at   4:31 PM. I spoke with Jarad Avalos. She states the NG tube appears to be at its   distal aspect outside the patient. I suspect it is coiled within the throat as   the NG tube position appears unchanged from the earlier exam.      XR CHEST PORT   Final Result   Feeding tube tip at the GE junction. This should be advanced into   the stomach. Airspace opacities in the lungs, left greater than right, not   significantly changed. XR CHEST PORT   Final Result      US RETROPERITONEUM COMP   Final Result   Small right renal cyst. Mild left hydronephrosis of unclear   etiology. Gonzales catheter in the bladder. Please see full report. XR CHEST PORT   Final Result   New left lung opacities, nonspecific, but could represent an infectious process   in the appropriate clinical setting. Asymmetric edema could appear similar,   though is considered less likely.              Lab Data Reviewed: (see below)      Medications:  Current Facility-Administered Medications   Medication Dose Route Frequency    potassium chloride 10 mEq in 100 ml IVPB  10 mEq IntraVENous Q1H    amino acid 4.25 % dextrose 5 % with electrolytes (CLINIMIX E) infusion   IntraVENous CONTINUOUS    metoprolol (LOPRESSOR) injection 5 mg  5 mg IntraVENous Q6H    bisacodyL (DULCOLAX) suppository 10 mg  10 mg Rectal DAILY PRN    furosemide (LASIX) injection 40 mg  40 mg IntraVENous BID    midodrine (PROAMATINE) tablet 5 mg  5 mg Oral QID    hydrOXYzine (VISTARIL) injection 25 mg  25 mg IntraMUSCular Q6H PRN    insulin lispro (HUMALOG) injection   SubCUTAneous Q6H    dexamethasone (DECADRON) 4 mg/mL injection 4 mg  4 mg IntraVENous Q12H    dextrose (D50W) injection syrg 12.5 g  25 mL IntraVENous PRN    NOREPINephrine (LEVOPHED) 8 mg in 5% dextrose 250mL (32 mcg/mL) infusion  0.5-16 mcg/min IntraVENous TITRATE    famotidine (PF) (PEPCID) 20 mg in 0.9% sodium chloride 10 mL injection  20 mg IntraVENous DAILY    sodium chloride (NS) flush 5-40 mL  5-40 mL IntraVENous Q8H    sodium chloride (NS) flush 5-40 mL  5-40 mL IntraVENous PRN    heparin (porcine) injection 5,000 Units  5,000 Units SubCUTAneous Q12H    glucose chewable tablet 16 g  4 Tab Oral PRN    dextrose (D50W) injection syrg 12.5-25 g  25-50 mL IntraVENous PRN    glucagon (GLUCAGEN) injection 1 mg  1 mg IntraMUSCular PRN    piperacillin-tazobactam (ZOSYN) 3.375 g in 0.9% sodium chloride (MBP/ADV) 100 mL MBP  3.375 g IntraVENous Q12H       ______________________________________________________________________      Lab Review:     Recent Labs     03/01/21  0500   WBC 12.0*   HGB 10.6*   HCT 31.3*   *     Recent Labs     03/01/21  0500 02/28/21  0414 02/28/21  0120 02/27/21  1230 02/27/21  0440    144 141  --  147*   K 3.3* 3.4* 3.4*  --  3.4*   * 115* 114*  --  118*   CO2 29 23 22  --  22   * 263* 291*  --  222*   BUN 53* 48* 47*  --  59*   CREA 1.67* 1.63* 1.65*  --  1.94*   CA 8.3* 7.6* 7.6*  --  8.0*   MG  --   --   --  1.4*  --    ALB 2.3*  --  1.8*  --  1.8*   ALT 95*  --  110*  --  121*     No components found for: [place]  Recent Labs     02/28/21  0415   PH 7.46*   PCO2 28*   PO2 48*   HCO3 22   FIO2 21       Other pertinent lab:          Assessment & Plan:        1. Severe sepsis:  He was weaned off Levophed 2/22. Etiology appears to be multifactorial.  Probably urinary tract infection. However, he does appear to have aspiration pneumonia. Additionally, Line infection cannot be ruled out. He has right upper extremity PICC (peripherally inserted central catheter) line in place. Infectious Disease is also on the case. Urine cultures are showing gram-negative rods, Klebsiella pneumonia identified. He is empirically started on Zosyn, vancomycin and azithromycin which will be continued. Further adjustment per ID. 2.  Altered mental status: This appears to be secondary to underlying sepsis. He is at risk for aspiration. Speech has evaluated the patient. Recommending alternative means of nutrition. NG tube could not be passed. GI is consulted for possible PEG tube placement. He is started on PPN and lipids. It is not unreasonable to consider PEG tube in this elderly man with dementia and aspiration pneumonia. 3.  Hypoxic respiratory failure:    Chest x-ray today shows worsening infiltrates particularly left lower lobe. There is a component of fluid overload as well. He has weeping edema mainly of the right upper extremity. His yesterday blood gas showed hypoxia. He was started on 3 L of oxygen. This morning he was noted to be tachypneic, breathing at rate of 25 to 30/min. Saturating well. We will repeat blood gas today   Patient continues to require ICU monitoring and care. He is on droplet precautions because of history of COVID-19 infection. We will avoid nebulizations at this time.     Chest x-ray results were reviewed which showed slight improvement in right middle zone infiltrates/edema. 4.  Acute renal failure:  Creatinine has improved to 1.63, was 5.04. Nephrology has been consulted as well. However he has significant edema now particular right upper extremity. Dopplers of the right upper extremity did not show any DVT. Renal functions are stable. 5.  Atrial fibrillation:   Cardiology is also consulted as well. On diltiazem infusion. 6.  Multiple other medical problems including diabetes mellitus, hypertension, hypothyroidism, polycythemia vera, spinal stenosis and a history of transient ischemic attack. 7.  For deep venous thrombosis prophylaxis, he is started on heparin subcutaneously. 8.  For gastrointestinal stress prophylaxis, on IV Pepcid.     Thank you for allowing me to participate in the care of this patient. I will follow the patient closely with you. The patient is now DNR. He is also on dexamethasone which can be continued at this time. However decreasing the dose. Patient remains critically ill. Prognosis is guarded. Discussed with the nursing team in the ICU. More than 50 minutes spent with patient care.     OC Banuelos MD

## 2021-03-01 NOTE — PROGRESS NOTES
Progress Note    Patient: Tanvir Kaur MRN: [id number]  SSN: [id number]    YOB: 1934  Age: 80 y.o. Sex: male      Admit Date: 2/21/2021    LOS: 8 days     Subjective:   Had nonsustained V. tach. Patient is on 3 L of oxygen. Objective:     Vitals:    03/01/21 1022 03/01/21 1100 03/01/21 1232 03/01/21 1410   BP: 124/78  123/84    Pulse: 80  87    Resp:       Temp:  98.2 °F (36.8 °C)     SpO2:    99%   Weight:       Height:            Intake and Output:  Current Shift: 03/01 0701 - 03/01 1900  In: -   Out: 900 [Urine:900]  Last three shifts: 02/27 1901 - 03/01 0700  In: 1025 [I.V.:1025]  Out: 4850 [Urine:4850]    Physical Exam:   General:  lethargic   Eyes:     Ears:  Normal TMs and external ear canals both ears. Nose: Nares normal. Septum midline. Mucosa normal. No drainage or sinus tenderness. Mouth/Throat: Lips, mucosa, and tongue normal. Teeth and gums normal.   Neck: Supple, symmetrical, trachea midline, no adenopathy, thyroid: no enlargment/tenderness/nodules, no carotid bruit and no JVD. Back:   Symmetric, no curvature. ROM normal. No CVA tenderness. Lungs:   Clear to auscultation bilaterally. Heart:  Regular rate and rhythm, S1, S2 normal, no murmur, click, rub or gallop. Abdomen:   Soft, non-tender. Bowel sounds normal. No masses,  No organomegaly. Extremities: Extremities normal, atraumatic, no cyanosis or edema. Pulses: 2+ and symmetric all extremities. Skin: Skin color, texture, turgor normal. No rashes or lesions   Lymph nodes: lethargic   Neurologic: lethargic       Lab/Data Review: All lab results for the last 24 hours reviewed.      Recent Results (from the past 24 hour(s))   GLUCOSE, POC    Collection Time: 02/28/21  5:54 PM   Result Value Ref Range    Glucose (POC) 175 (H) 65 - 100 mg/dL    Performed by Markie Rivera    GLUCOSE, POC    Collection Time: 03/01/21 12:15 AM   Result Value Ref Range    Glucose (POC) 182 (H) 65 - 100 mg/dL    Performed by ALLI HORTA    CBC WITH AUTOMATED DIFF    Collection Time: 03/01/21  5:00 AM   Result Value Ref Range    WBC 12.0 (H) 4.1 - 11.1 K/uL    RBC 2.59 (L) 4.10 - 5.70 M/uL    HGB 10.6 (L) 12.1 - 17.0 g/dL    HCT 31.3 (L) 36.6 - 50.3 %    .8 (H) 80.0 - 99.0 FL    MCH 40.9 (H) 26.0 - 34.0 PG    MCHC 33.9 30.0 - 36.5 g/dL    RDW 14.4 11.5 - 14.5 %    PLATELET 649 (L) 284 - 400 K/uL    MPV 13.6 (H) 8.9 - 12.9 FL    NRBC 1.8 (H) 0  WBC    ABSOLUTE NRBC 0.21 (H) 0.00 - 0.01 K/uL    NEUTROPHILS 93 (H) 32 - 75 %    LYMPHOCYTES 3 (L) 12 - 49 %    MONOCYTES 3 (L) 5 - 13 %    EOSINOPHILS 0 0 - 7 %    BASOPHILS 0 0 - 1 %    IMMATURE GRANULOCYTES 1 (H) 0.0 - 0.5 %    ABS. NEUTROPHILS 11.3 (H) 1.8 - 8.0 K/UL    ABS. LYMPHOCYTES 0.4 (L) 0.8 - 3.5 K/UL    ABS. MONOCYTES 0.3 0.0 - 1.0 K/UL    ABS. EOSINOPHILS 0.0 0.0 - 0.4 K/UL    ABS. BASOPHILS 0.0 0.0 - 0.1 K/UL    ABS. IMM. GRANS. 0.1 (H) 0.00 - 0.04 K/UL    DF AUTOMATED     METABOLIC PANEL, COMPREHENSIVE    Collection Time: 03/01/21  5:00 AM   Result Value Ref Range    Sodium 145 136 - 145 mmol/L    Potassium 3.3 (L) 3.5 - 5.1 mmol/L    Chloride 112 (H) 97 - 108 mmol/L    CO2 29 21 - 32 mmol/L    Anion gap 4 (L) 5 - 15 mmol/L    Glucose 264 (H) 65 - 100 mg/dL    BUN 53 (H) 6 - 20 mg/dL    Creatinine 1.67 (H) 0.70 - 1.30 mg/dL    BUN/Creatinine ratio 32 (H) 12 - 20      GFR est AA 48 (L) >60 ml/min/1.73m2    GFR est non-AA 39 (L) >60 ml/min/1.73m2    Calcium 8.3 (L) 8.5 - 10.1 mg/dL    Bilirubin, total 0.7 0.2 - 1.0 mg/dL    AST (SGOT) 35 15 - 37 U/L    ALT (SGPT) 95 (H) 12 - 78 U/L    Alk.  phosphatase 123 (H) 45 - 117 U/L    Protein, total 6.0 (L) 6.4 - 8.2 g/dL    Albumin 2.3 (L) 3.5 - 5.0 g/dL    Globulin 3.7 2.0 - 4.0 g/dL    A-G Ratio 0.6 (L) 1.1 - 2.2     GLUCOSE, POC    Collection Time: 03/01/21 11:47 AM   Result Value Ref Range    Glucose (POC) 247 (H) 65 - 100 mg/dL    Performed by Kana Jaramillo, ARTERIAL    Collection Time: 03/01/21  1:30 PM   Result Value Ref Range    pH 7.51 (H) 7.35 - 7.45      PCO2 32 (L) 35 - 45 mmHg    PO2 43 (LL) 75 - 100 mmHg    O2 SAT 82 (LL) >95 %    BICARBONATE 26 22 - 26 mmol/L    BASE EXCESS 2.7 (H) 0 - 2 mmol/L    O2 METHOD Nasal Cannula      O2 FLOW RATE 3 L/min    SITE Left Radial      VIGNESH'S TEST PASS      Critical value read back ESSENCE,RN          Assessment:     Active Problems:    Pneumonia (2/21/2021)      Renal failure (2/21/2021)      Sepsis ([address] (2/21/2021)    Acute hypoxic respiratory failure  COVID-19 pneumonitis  Diabetes mellitus type 2 uncontrolled with hyperglycemia  Septic encephalopathy  Plan:     Continue with care discuss alternative care including hospice with family  Left a message  Signed By: Anmol Perdomo MD     March 1, 2021

## 2021-03-01 NOTE — PROGRESS NOTES
PT eval order received and acknowledged. PT eval attempted at 1445 however pt not following commands and remains minimally arouseable to voice and sternal rub (opening eyes but not focusing on PT and then quickly closing them). This is PT's 4th attempt on pt over the past 5 days, pt remains inappropriate for skilled services at this time. PT orders will be discontinued, please reorder if status improves and pt is able to participate. Thank you.

## 2021-03-01 NOTE — PROGRESS NOTES
Infectious Disease Progress Note           Subjective:   Pt seen and examined at bedside. Decreased responsiveness, no acute events since last seen   Objective:   Physical Exam:     Visit Vitals  /78   Pulse 80   Temp 98.2 °F (36.8 °C)   Resp (!) 32   Ht 6' (1.829 m)   Wt 172 lb 9.9 oz (78.3 kg)   SpO2 96%   BMI 23.41 kg/m²    O2 Flow Rate (L/min): 3 l/min O2 Device: Nasal cannula    Temp (24hrs), Av.3 °F (36.8 °C), Min:97.5 °F (36.4 °C), Max:100.2 °F (37.9 °C)    701 -  190  In: -   Out: 450 [Urine:450]   1901 -  0700  In: 1025 [I.V.:1025]  Out: 4850 [Urine:4850]    General: NAD, generalized weakness   HEENT: JUAN CARLOS, Moist mucosa   Lungs: Decreased at the bases, no wheeze/rhonchi  Heart: S1S2+, RRR, no murmur  Abdo: Soft, NT, ND, +BS   : + indwelling benjamin cath   Exts: Trace edema, + pulses b/l   Skin: No wounds, No rashes or lesions    Data Review:       Recent Days:  Recent Labs     21  0500   WBC 12.0*   HGB 10.6*   HCT 31.3*   *     Recent Labs     21  0500 21  0414 21  0120   BUN 53* 48* 47*   CREA 1.67* 1.63* 1.65*       Lab Results   Component Value Date/Time    C-Reactive protein 4.33 (H) 2021 03:30 AM        Microbiology   - Urine Cx : K. Pneumoniae and E.faecalis  - Blood Cx : Coag neg staff     Diagnostics   CXR Results  (Last 48 hours)               21 0334  XR CHEST PORT Final result    Impression:  Diffuse airspace opacities in the lungs, possibly improved on the   right. Narrative:  Chest, frontal view, 3/1/2021       History: Evaluate for improvement. Comparison: Including chest 2021. Findings: Right-sided PICC line tip is at the distal SVC. The cardiac   silhouette is stable. The lungs are adequately expanded. Diffuse airspace   opacities in the lungs are again noted.   Airspace opacities in the right lung   are either less prominent due to more neutral positioning as compared to the   study performed one day prior or improved. No pneumothorax is identified. The   osseous structures are stable. 02/27/21 1445  XR CHEST PORT Final result    Impression:  No significant change. Narrative:  XR CHEST PORT       Comparison:  2/26/2021       Single view:  Stable multifocal groundglass opacities. Relative contributions of   pulmonary edema or pneumonia uncertain. No pneumothorax or pleural effusion   apparent. The heart size is stable. Vascular calcifications are noted. Right   PICC line tip projects over the lower superior vena cava, unchanged. Assessment/Plan     1. Coag neg staph bacteremia in the setting of right arm PICC line      Isolated from 1/4 BCx btles collected in the ED, repeat on 02/23 is neg       Afebrile, moderate leukocytosis, likely contributed by steroid therapy       S/p 7 days of Vanc, will d/c      2. UTI, abnormal UA, K. Pneumoniae and E.faecalis isolated from Cx.      + benjamin cath. On day # 9/14 of Zosyn     3. Acute renal failure: Cr continues to improve     4. COVID-19 w/o sig hypoxia, continue on Decadron, will d/c Azithromycin         5.  Dysphagia: On PPN, peg tube placement is being discussed     Lou Hubbard MD    3/1/2021

## 2021-03-01 NOTE — PROGRESS NOTES
Progress Note      3/1/21  13:30PM  NAME: Keon Palmer   MRN:  [id number]   Admit Diagnosis: Sepsis ([facility] [address] [A41.9]  Pneumonia [J18.9]  Renal failure [N19]      Problem List:   -Atrial fibrillation  -Hypertension  -Dyslipidemia  -Hypothyroidism  -Sepsis  -History of TIA  -Spinal stenosis         Assessment/Plan:   3/1/21  - patient observed lying in bed with eyes closed. Facial grimacing noted with painful stimuli. - no acute distress noted  - Still in atrial fibrillation on telemetry without acute cardiovascular events overnight  - vital signs are hemodynamically stable  - echo performed on 2/5/21 reveals an EF of 79%  - based upon by cardiovascular assessment will continue to treat conservatively and monitor prn during hospitalization.  ==========================================================  Note dictated February 26, 2021.  -Patient is lying comfortably in the bed and is still in atrial fibrillation with controlled heart rate.  -Respond to vocal command or minimum painful stimuli.  -No acute cardiac issue at this point for me to be aggressive in his cardiac care so we will continue current conservative medical management and follow him on as needed basis from now onward while he is in the hospital.  ------------------------------------------------------------------------------------------------------------  2/25/21  - patient resting in bed with eyes opened. Disoriented x 3. No acute distress noted. - atrial fibrillation on telemetry 80-90 bpm. controlled  - no acute cardiovascular events overnight  - troponin continues to trend downward  - continue cardizem IV with goal heart rate less than 100bpm  - will continue conservative cardiovascular management at this time and monitor patient during hospitalization.  ==========================================================  2/24/21  - patient observed lying in bed with eyes closed. Arouses when name is called.  - - non-verbal, no acute distress noted.  - atrial fibrillation on telemetry 110-120bpm.  - no acute events overnight. - troponin trending downward  - will start on digoxin with caution  with the goal to control heart rate less than 100bpm to minimize the risk of tachycardia induced cardiomyopathy.  - Considering renal insufficiency I will give him only one dose and replace K+  aggressively  - if the medical management fails we will consider SHELIA cardioversion.  ==========================================================  -Follow-up visit from cardiology secondary to borderline increased troponin  -Patient is lying comfortably in the bed but did not respond or engage with verbal communication.  -Monitor shows atrial fibrillation with controlled heart rate  -Normal ejection fraction of 79%.  -No acute cardiac issue based on my overall cardiac assessment.  -This was a limited examination this morning as patient has tenderness which could be consistent with COVID-19 infection.  -No further cardiovascular testing warranted at this time based on my overall cardiac assessment and we will continue to watch him while he is in the hospital along with the team with conservative approach         []       High complexity decision making was performed in this patient at high risk for decompensation with multiple organ involvement. Subjective:     Darl Never denies chest pain, dyspnea. Discussed with RN events overnight. Review of Systems: Patient is non-verbal. No acute distress noted. Objective:      Physical Exam:    Last 24hrs VS reviewed since prior progress note.  Most recent are:    Visit Vitals  /84   Pulse 87   Temp 98.2 °F (36.8 °C)   Resp (!) 32   Ht 6' (1.829 m)   Wt 78.3 kg (172 lb 9.9 oz)   SpO2 96%   BMI 23.41 kg/m²       Intake/Output Summary (Last 24 hours) at 3/1/2021 1402  Last data filed at 3/1/2021 1346  Gross per 24 hour   Intake 1025 ml   Output 4300 ml   Net -3275 ml      General Appearance: ill-appearing, no acute distress  Ears/Nose/Mouth/Throat: Facial grimacing with painful stimuli  Neck: Supple. JVP within normal limits. Carotids good upstrokes, with no bruit. Chest: Lungs diminished to auscultation bilaterally; rhonci throughout. Cardiovascular: irregular rate and rhythm, no murmur, rubs, gallops. Abdomen: Soft, non-tender, bowel sounds are hypoactive. No organomegaly. Extremities:trace edema bilaterally. Femoral pulses +2, Distal Pulses +1.  +2 edema and erythema noted to right upper extremity. Skin: Warm and dry. Neuro: facial grimacing noted with painful stimuli      PMH/SH reviewed - no change compared to H&P    Data Review    Telemetry: atrial fibrillation    Result status: Final result   · LV: Calculated LVEF is 79%. Normal cavity size, wall thickness, systolic function (ejection fraction normal) and diastolic function. Wall motion: normal. Normal left ventricular strain. · LA: Moderately dilated left atrium. · RV: Mildly dilated right ventricle. Mildly reduced systolic function. · RA: Mildly dilated right atrium. · AV: Aortic valve leaflet calcification present. Mild aortic valve regurgitation is present. · MV: Mild mitral valve regurgitation is present. · TV: Mild tricuspid valve regurgitation is present. RVSP 41.  · Pericardium: Trivial pericardial effusion. Echo Findings    Left Ventricle Normal cavity size, wall thickness, systolic function (ejection fraction normal) and diastolic function. The muscle mass is normal. The cavity shape is normal. Wall motion: normal. The calculated EF is 79%. End-systolic volume is normal. Normal left ventricular strain. Normal left ventricular diastolic pressure. End-diastolic volume is normal.   Left Atrium Moderately dilated left atrium. Right Ventricle Mildly dilated right ventricle. Mildly reduced systolic function. Right Atrium Mildly dilated right atrium. Interatrial Septum Interatrial septum not well visualized   Aortic Valve No stenosis.  There is leaflet calcification. Mild aortic valve regurgitation. Mitral Valve Normal valve structure and no stenosis. Mild regurgitation. Tricuspid Valve Normal valve structure and no stenosis. Mild regurgitation. Pulmonic Valve Pulmonic valve not well visualized. Aorta The aorta was not well visualized. Pulmonary Artery Pulmonary arteries not well visualized. IVC/Hepatic Veins Normal structure. Normal central venous pressure (3 mmHg); IVC diameter is less than 21 mm and collapses more than 50% with respiration. Pericardium Trivial pericardial effusion noted. Carotid Duplex 2/5/21:    Right Carotid    There is mild stenosis in the right ICA (<50%). The right vertebral is antegrade. Left Carotid    There is mild stenosis in the left ICA (<50%). The left vertebral is antegrade. Results for Rosy Mcleod (MRN [id number]) as of 3/1/2021 14:04   Ref. Range 3/1/2021 05:00   WBC Latest Ref Range: 4.1 - 11.1 K/uL 12.0 (H)   NRBC Latest Ref Range: 0  WBC 1.8 (H)   RBC Latest Ref Range: 4.10 - 5.70 M/uL 2.59 (L)   HGB Latest Ref Range: 12.1 - 17.0 g/dL 10.6 (L)   HCT Latest Ref Range: 36.6 - 50.3 % 31.3 (L)   MCV Latest Ref Range: 80.0 - 99.0 .8 (H)   MCH Latest Ref Range: 26.0 - 34.0 PG 40.9 (H)   MCHC Latest Ref Range: 30.0 - 36.5 g/dL 33.9   RDW Latest Ref Range: 11.5 - 14.5 % 14.4   PLATELET Latest Ref Range: 150 - 400 K/uL 146 (L)   MPV Latest Ref Range: 8.9 - 12.9 FL 13.6 (H)   NEUTROPHILS Latest Ref Range: 32 - 75 % 93 (H)   LYMPHOCYTES Latest Ref Range: 12 - 49 % 3 (L)   MONOCYTES Latest Ref Range: 5 - 13 % 3 (L)   EOSINOPHILS Latest Ref Range: 0 - 7 % 0   BASOPHILS Latest Ref Range: 0 - 1 % 0   IMMATURE GRANULOCYTES Latest Ref Range: 0.0 - 0.5 % 1 (H)   DF Latest Units:   AUTOMATED   ABSOLUTE NRBC Latest Ref Range: 0.00 - 0.01 K/uL 0.21 (H)   ABS. NEUTROPHILS Latest Ref Range: 1.8 - 8.0 K/UL 11.3 (H)   ABS. IMM. GRANS. Latest Ref Range: 0.00 - 0.04 K/UL 0.1 (H)   ABS.  LYMPHOCYTES Latest Ref Range: 0.8 - 3.5 K/UL 0.4 (L)   ABS. MONOCYTES Latest Ref Range: 0.0 - 1.0 K/UL 0.3   ABS. EOSINOPHILS Latest Ref Range: 0.0 - 0.4 K/UL 0.0   ABS. BASOPHILS Latest Ref Range: 0.0 - 0.1 K/UL 0.0   Results for Hung Gong (MRN [id number]) as of 3/1/2021 14:04   Ref. Range 3/1/2021 05:00   Sodium Latest Ref Range: 136 - 145 mmol/L 145   Potassium Latest Ref Range: 3.5 - 5.1 mmol/L 3.3 (L)   Chloride Latest Ref Range: 97 - 108 mmol/L 112 (H)   CO2 Latest Ref Range: 21 - 32 mmol/L 29   Anion gap Latest Ref Range: 5 - 15 mmol/L 4 (L)   Glucose Latest Ref Range: 65 - 100 mg/dL 264 (H)   BUN Latest Ref Range: 6 - 20 mg/dL 53 (H)   Creatinine Latest Ref Range: 0.70 - 1.30 mg/dL 1.67 (H)   BUN/Creatinine ratio Latest Ref Range: 12 - 20   32 (H)   Calcium Latest Ref Range: 8.5 - 10.1 mg/dL 8.3 (L)   GFR est non-AA Latest Ref Range: >60 ml/min/1.73m2 39 (L)   GFR est AA Latest Ref Range: >60 ml/min/1.73m2 48 (L)   Bilirubin, total Latest Ref Range: 0.2 - 1.0 mg/dL 0.7   Protein, total Latest Ref Range: 6.4 - 8.2 g/dL 6.0 (L)   Albumin Latest Ref Range: 3.5 - 5.0 g/dL 2.3 (L)   Globulin Latest Ref Range: 2.0 - 4.0 g/dL 3.7   A-G Ratio Latest Ref Range: 1.1 - 2.2   0.6 (L)   ALT Latest Ref Range: 12 - 78 U/L 95 (H)   AST Latest Ref Range: 15 - 37 U/L 35   Alk.  phosphatase Latest Ref Range: 45 - 117 U/L 123 (H)   Current IP Meds  Comment  Hide  (From admission, onward)     Last edited by  on  at    Start   Stop Status Route Frequency Ordered   03/01/21 2200  amino acid 4.25 % dextrose 5 % with electrolytes (CLINIMIX E) infusion    Discontinue    03/02 2159 Dispensed IV CONTINUOUS 03/01/21 1158   03/01/21 1200  potassium chloride 10 mEq in 100 ml IVPB    Discontinue    03/01 1457 Dispensed IV EVERY 1 HOUR 03/01/21 1155   02/28/21 2100  furosemide (LASIX) injection 40 mg    Discontinue    -- Dispensed IV 2 TIMES DAILY 02/28/21 1709   02/28/21 2000  vancomycin (VANCOCIN) 1,000 mg in 0.9% sodium chloride 250 mL (VIAL-MATE)      02/28 2252 Completed IV ONCE 02/28/21 1817   02/28/21 1800  midodrine (PROAMATINE) tablet 5 mg    Discontinue    -- Verified PO 4 TIMES DAILY 02/28/21 1712   02/28/21 1800  magnesium sulfate 1 g/100 ml IVPB (premix or compounded)      02/28 1852 Completed IV ONCE 02/28/21 1718   02/28/21 1800  furosemide (LASIX) injection 40 mg      02/28 1749 Completed IV ONCE 02/28/21 1720   02/28/21 1648  bisacodyL (DULCOLAX) suppository 10 mg    Discontinue    -- Verified RE DAILY PRN 02/28/21 1648   02/28/21 1500  albumin human 25% (BUMINATE) solution 25 g   Note to Pharmacy: X 2 runs    02/28 1611 Completed IV ONCE 02/28/21 1424   02/28/21 1200  metoprolol (LOPRESSOR) injection 5 mg    Discontinue    -- Dispensed IV EVERY 6 HOURS 02/28/21 0831   02/27/21 0926  hydrOXYzine (VISTARIL) injection 25 mg    Discontinue    -- Dispensed IM EVERY 6 HOURS AS NEEDED 02/27/21 0927   02/24/21 2100  dexamethasone (DECADRON) 4 mg/mL injection 4 mg    Discontinue    -- Dispensed IV EVERY 12 HOURS 02/24/21 1219   02/24/21 1200  insulin lispro (HUMALOG) injection (CORRECTIONAL SCALE LISPRO PANEL)    Discontinue    -- Dispensed SC EVERY 6 HOURS 02/24/21 1018   02/23/21 1737  dextrose (D50W) injection syrg 12.5 g    Discontinue    -- Verified IV AS NEEDED 02/23/21 1741   02/22/21 1400  famotidine (PF) (PEPCID) 20 mg in 0.9% sodium chloride 10 mL injection    Discontinue    -- Dispensed IV DAILY 02/22/21 1307   02/22/21 0900  NOREPINephrine (LEVOPHED) 8 mg in 5% dextrose 250mL (32 mcg/mL) infusion    Discontinue    -- Verified IV TITRATE 02/22/21 0806   02/21/21 2100  piperacillin-tazobactam (ZOSYN) 3.375 g in 0.9% sodium chloride (MBP/ADV) 100 mL MBP    Discontinue    03/07 2259 Dispensed IV EVERY 12 HOURS 02/21/21 1516 02/21/21 1510  glucose chewable tablet 16 g (HYPOGLYCEMIC PROTOCOL)    Discontinue    -- Dispensed PO AS NEEDED 02/21/21 1512 02/21/21 1510  dextrose (D50W) injection syrg 12.5-25 g (HYPOGLYCEMIC PROTOCOL)    Discontinue    -- Dispensed IV AS NEEDED 02/21/21 1512   02/21/21 1510  glucagon (GLUCAGEN) injection 1 mg (HYPOGLYCEMIC PROTOCOL)    Discontinue    -- Verified IM AS NEEDED 02/21/21 1512   02/21/21 1500  sodium chloride (NS) flush 5-40 mL (SALINE LOCK FLUSH PANEL)    Discontinue    -- Dispensed IV EVERY 8 HOURS 02/21/21 1457   02/21/21 1500  heparin (porcine) injection 5,000 Units    Discontinue    -- Dispensed SC EVERY 12 HOURS 02/21/21 1457   02/21/21 1450  sodium chloride (NS) flush 5-40 mL (SALINE LOCK FLUSH PANEL)    Discontinue    -- Dispensed IV AS NEEDED 02/21/21 1457     Gabbie Dorman NP

## 2021-03-01 NOTE — PROGRESS NOTES
Attempted to see patient. Patient continues w/ decreased alertness and inability to participate in p.o. trials. Patient not following commands and opening eyes to clinician. Open mouth breathing at baseline. Administered cold swab, patient w/ grimacing and some oral defensiveness. No lingual movement or swallow initiation noted. Recommend to continue NPO. Continue w/ alternative means of nutrition. Spoke w/ nsg. Patient has continued to be not alert and appropriate for ST due to decline. Will d/c consult at this time. Please re consult for ST to evaluate and treat when patient is alert and appropriate for bedside swallow trials.

## 2021-03-02 LAB
ALBUMIN SERPL-MCNC: 2.4 G/DL (ref 3.5–5)
ANION GAP SERPL CALC-SCNC: 5 MMOL/L (ref 5–15)
BUN SERPL-MCNC: 69 MG/DL (ref 6–20)
BUN/CREAT SERPL: 35 (ref 12–20)
CA-I BLD-MCNC: 8.1 MG/DL (ref 8.5–10.1)
CHLORIDE SERPL-SCNC: 112 MMOL/L (ref 97–108)
CO2 SERPL-SCNC: 29 MMOL/L (ref 21–32)
CREAT SERPL-MCNC: 1.95 MG/DL (ref 0.7–1.3)
GLUCOSE BLD STRIP.AUTO-MCNC: 165 MG/DL (ref 65–100)
GLUCOSE BLD STRIP.AUTO-MCNC: 174 MG/DL (ref 65–100)
GLUCOSE BLD STRIP.AUTO-MCNC: 327 MG/DL (ref 65–100)
GLUCOSE BLD STRIP.AUTO-MCNC: 351 MG/DL (ref 65–100)
GLUCOSE SERPL-MCNC: 290 MG/DL (ref 65–100)
MAGNESIUM SERPL-MCNC: 1.6 MG/DL (ref 1.6–2.4)
PERFORMED BY, TECHID: ABNORMAL
PHOSPHATE SERPL-MCNC: 4.4 MG/DL (ref 2.6–4.7)
POTASSIUM SERPL-SCNC: 3.7 MMOL/L (ref 3.5–5.1)
SODIUM SERPL-SCNC: 146 MMOL/L (ref 136–145)

## 2021-03-02 PROCEDURE — 74011000250 HC RX REV CODE- 250: Performed by: INTERNAL MEDICINE

## 2021-03-02 PROCEDURE — 74011250636 HC RX REV CODE- 250/636: Performed by: INTERNAL MEDICINE

## 2021-03-02 PROCEDURE — 65610000006 HC RM INTENSIVE CARE

## 2021-03-02 PROCEDURE — 82962 GLUCOSE BLOOD TEST: CPT

## 2021-03-02 PROCEDURE — 83735 ASSAY OF MAGNESIUM: CPT

## 2021-03-02 PROCEDURE — 74011636637 HC RX REV CODE- 636/637: Performed by: INTERNAL MEDICINE

## 2021-03-02 PROCEDURE — 74011000258 HC RX REV CODE- 258: Performed by: INTERNAL MEDICINE

## 2021-03-02 PROCEDURE — 99232 SBSQ HOSP IP/OBS MODERATE 35: CPT | Performed by: INTERNAL MEDICINE

## 2021-03-02 PROCEDURE — 77010033678 HC OXYGEN DAILY

## 2021-03-02 PROCEDURE — 74011250636 HC RX REV CODE- 250/636: Performed by: LEGAL MEDICINE

## 2021-03-02 PROCEDURE — 80069 RENAL FUNCTION PANEL: CPT

## 2021-03-02 PROCEDURE — 36415 COLL VENOUS BLD VENIPUNCTURE: CPT

## 2021-03-02 RX ORDER — SCOLOPAMINE TRANSDERMAL SYSTEM 1 MG/1
1 PATCH, EXTENDED RELEASE TRANSDERMAL
Status: DISCONTINUED | OUTPATIENT
Start: 2021-03-02 | End: 2021-03-03 | Stop reason: HOSPADM

## 2021-03-02 RX ORDER — FUROSEMIDE 10 MG/ML
40 INJECTION INTRAMUSCULAR; INTRAVENOUS DAILY
Status: DISCONTINUED | OUTPATIENT
Start: 2021-03-03 | End: 2021-03-03 | Stop reason: HOSPADM

## 2021-03-02 RX ORDER — MORPHINE SULFATE 2 MG/ML
2 INJECTION, SOLUTION INTRAMUSCULAR; INTRAVENOUS
Status: DISCONTINUED | OUTPATIENT
Start: 2021-03-02 | End: 2021-03-03 | Stop reason: HOSPADM

## 2021-03-02 RX ORDER — FACIAL-BODY WIPES
10 EACH TOPICAL DAILY
Qty: 30 SUPPOSITORY | Refills: 0 | Status: SHIPPED | OUTPATIENT
Start: 2021-03-02

## 2021-03-02 RX ORDER — LORAZEPAM 2 MG/ML
1 INJECTION INTRAMUSCULAR
Status: DISCONTINUED | OUTPATIENT
Start: 2021-03-02 | End: 2021-03-03 | Stop reason: HOSPADM

## 2021-03-02 RX ORDER — ONDANSETRON 4 MG/1
4 TABLET, ORALLY DISINTEGRATING ORAL
Status: DISCONTINUED | OUTPATIENT
Start: 2021-03-02 | End: 2021-03-03 | Stop reason: HOSPADM

## 2021-03-02 RX ORDER — MAGNESIUM SULFATE 1 G/100ML
1 INJECTION INTRAVENOUS ONCE
Status: COMPLETED | OUTPATIENT
Start: 2021-03-02 | End: 2021-03-03

## 2021-03-02 RX ORDER — ACETAMINOPHEN 650 MG/1
650 SUPPOSITORY RECTAL
Status: DISCONTINUED | OUTPATIENT
Start: 2021-03-02 | End: 2021-03-03 | Stop reason: HOSPADM

## 2021-03-02 RX ORDER — ACETAMINOPHEN 325 MG/1
650 TABLET ORAL
Status: DISCONTINUED | OUTPATIENT
Start: 2021-03-02 | End: 2021-03-03 | Stop reason: HOSPADM

## 2021-03-02 RX ORDER — FACIAL-BODY WIPES
10 EACH TOPICAL DAILY PRN
Status: DISCONTINUED | OUTPATIENT
Start: 2021-03-02 | End: 2021-03-03 | Stop reason: HOSPADM

## 2021-03-02 RX ADMIN — Medication 10 ML: at 05:43

## 2021-03-02 RX ADMIN — HEPARIN SODIUM 5000 UNITS: 5000 INJECTION INTRAVENOUS; SUBCUTANEOUS at 03:41

## 2021-03-02 RX ADMIN — FUROSEMIDE 40 MG: 10 INJECTION, SOLUTION INTRAMUSCULAR; INTRAVENOUS at 08:48

## 2021-03-02 RX ADMIN — METOPROLOL TARTRATE 5 MG: 5 INJECTION INTRAVENOUS at 00:22

## 2021-03-02 RX ADMIN — METOPROLOL TARTRATE 5 MG: 5 INJECTION INTRAVENOUS at 18:08

## 2021-03-02 RX ADMIN — PIPERACILLIN AND TAZOBACTAM 3.38 G: 3; .375 INJECTION, POWDER, LYOPHILIZED, FOR SOLUTION INTRAVENOUS at 11:02

## 2021-03-02 RX ADMIN — FAMOTIDINE 20 MG: 10 INJECTION INTRAVENOUS at 08:48

## 2021-03-02 RX ADMIN — METOPROLOL TARTRATE 5 MG: 5 INJECTION INTRAVENOUS at 12:14

## 2021-03-02 RX ADMIN — MAGNESIUM SULFATE 1 G: 1 INJECTION INTRAVENOUS at 11:04

## 2021-03-02 RX ADMIN — Medication 10 ML: at 23:08

## 2021-03-02 RX ADMIN — INSULIN LISPRO 2 UNITS: 100 INJECTION, SOLUTION INTRAVENOUS; SUBCUTANEOUS at 18:08

## 2021-03-02 RX ADMIN — Medication 10 ML: at 14:14

## 2021-03-02 RX ADMIN — PIPERACILLIN AND TAZOBACTAM 3.38 G: 3; .375 INJECTION, POWDER, LYOPHILIZED, FOR SOLUTION INTRAVENOUS at 23:08

## 2021-03-02 RX ADMIN — INSULIN LISPRO 8 UNITS: 100 INJECTION, SOLUTION INTRAVENOUS; SUBCUTANEOUS at 12:16

## 2021-03-02 RX ADMIN — PIPERACILLIN AND TAZOBACTAM 3.38 G: 3; .375 INJECTION, POWDER, LYOPHILIZED, FOR SOLUTION INTRAVENOUS at 00:22

## 2021-03-02 RX ADMIN — DEXAMETHASONE SODIUM PHOSPHATE 4 MG: 4 INJECTION, SOLUTION INTRA-ARTICULAR; INTRALESIONAL; INTRAMUSCULAR; INTRAVENOUS; SOFT TISSUE at 08:48

## 2021-03-02 RX ADMIN — HEPARIN SODIUM 5000 UNITS: 5000 INJECTION INTRAVENOUS; SUBCUTANEOUS at 15:49

## 2021-03-02 RX ADMIN — DEXAMETHASONE SODIUM PHOSPHATE 4 MG: 4 INJECTION, SOLUTION INTRA-ARTICULAR; INTRALESIONAL; INTRAMUSCULAR; INTRAVENOUS; SOFT TISSUE at 23:02

## 2021-03-02 RX ADMIN — METOPROLOL TARTRATE 5 MG: 5 INJECTION INTRAVENOUS at 05:43

## 2021-03-02 RX ADMIN — INSULIN LISPRO 10 UNITS: 100 INJECTION, SOLUTION INTRAVENOUS; SUBCUTANEOUS at 05:43

## 2021-03-02 RX ADMIN — INSULIN LISPRO 6 UNITS: 100 INJECTION, SOLUTION INTRAVENOUS; SUBCUTANEOUS at 00:23

## 2021-03-02 NOTE — PROGRESS NOTES
Pulmonary Progress Note      NAME: Shakira Coello   :  1934  MRM:  [id number]    Date/Time: 3/2/2021  11:56 AM         Subjective:     Patient seen and examined in the ICU. Discussed with the RN. Patient's status remain unchanged. Not responding to verbal commands. Stuporous. Yesterday he was on 3 L of nasal cannula oxygen, he was switched to 1 L of oxygen now. Family has decided to make him comfort care. He is started on PPN and lipids. He remains confused and obtunded. Speech evaluated him. Alternative means of nutrition was recommended. Unable to pass NG tube. Started on diltiazem infusion. Cardiology on board. He has a Gonzales catheter and Flexi-Seal.    Past Medical History reviewed and unchanged from Admission History and Physical       Objective:     Physical Exam     Vitals:      Last 24hrs VS reviewed since prior progress note. Most recent are:    Visit Vitals  BP (!) 111/94   Pulse 88   Temp 98.4 °F (36.9 °C)   Resp 24   Ht 6' (1.829 m)   Wt 73.6 kg (162 lb 4.1 oz)   SpO2 97%   BMI 22.01 kg/m²     SpO2 Readings from Last 6 Encounters:   21 97%   21 96%    O2 Flow Rate (L/min): 1 l/min       Intake/Output Summary (Last 24 hours) at 3/2/2021 1016  Last data filed at 3/2/2021 0603  Gross per 24 hour   Intake    Output 2550 ml   Net -2550 ml      GENERAL:  The patient is an elderly white male who is somnolent. His tachypnea has shown improvement. He is on 1 L of oxygen now. Hilda Khan HEENT:  Shows pupils are equal and reactive to light. No pallor or icterus. Nasal passages are apparently patent. Oropharynx is difficult to evaluate. He will not cooperate with opening his mouth. NECK:  Supple. Trachea is central.  No JVD or lymphadenopathy. He is not using any accessory muscles of respirations. CHEST:  Symmetrical with equal and fair air entry bilaterally. Unchanged scattered rhonchi are noted. HEART:  Rhythm is irregular with monitor showing sinus rhythm.   No loud murmur or gallop. ABDOMEN:  Soft and appears to be benign. Bowel sounds are audible. He has a Gonzales catheter and a Flexi-Seal in place. EXTREMITIES:  Do not show any cyanosis or clubbing.  +1 pitting edema. Pulses are palpable. However he has much more edema of the right upper extremity. It is basically weeping edema. NEUROLOGIC:  Shows that the patient is moving his extremities but appears to be confused. He has mittens in place. SKIN:  Warm and dry. XR CHEST PORT   Final Result   Diffuse airspace opacities in the lungs, possibly improved on the   right. XR CHEST PORT   Final Result   No significant change. XR CHEST PORT   Final Result      XR CHEST PORT   Final Result   Gastric tube unchanged from the prior exam and should be advanced 8   cm for optimal positioning. Persistent interstitial infiltrates, unchanged. Please see full report. I called the CCU with this result and recommendation at   4:31 PM. I spoke with Ulysses Castillo. She states the NG tube appears to be at its   distal aspect outside the patient. I suspect it is coiled within the throat as   the NG tube position appears unchanged from the earlier exam.      XR CHEST PORT   Final Result   Feeding tube tip at the GE junction. This should be advanced into   the stomach. Airspace opacities in the lungs, left greater than right, not   significantly changed. XR CHEST PORT   Final Result      US RETROPERITONEUM COMP   Final Result   Small right renal cyst. Mild left hydronephrosis of unclear   etiology. Gonzales catheter in the bladder. Please see full report. XR CHEST PORT   Final Result   New left lung opacities, nonspecific, but could represent an infectious process   in the appropriate clinical setting. Asymmetric edema could appear similar,   though is considered less likely.              Lab Data Reviewed: (see below)      Medications:  Current Facility-Administered Medications   Medication Dose Route Frequency    amino acid 4.25 % dextrose 5 % with electrolytes (CLINIMIX E) infusion   IntraVENous CONTINUOUS    metoprolol (LOPRESSOR) injection 5 mg  5 mg IntraVENous Q6H    bisacodyL (DULCOLAX) suppository 10 mg  10 mg Rectal DAILY PRN    furosemide (LASIX) injection 40 mg  40 mg IntraVENous BID    midodrine (PROAMATINE) tablet 5 mg  5 mg Oral QID    hydrOXYzine (VISTARIL) injection 25 mg  25 mg IntraMUSCular Q6H PRN    insulin lispro (HUMALOG) injection   SubCUTAneous Q6H    dexamethasone (DECADRON) 4 mg/mL injection 4 mg  4 mg IntraVENous Q12H    dextrose (D50W) injection syrg 12.5 g  25 mL IntraVENous PRN    NOREPINephrine (LEVOPHED) 8 mg in 5% dextrose 250mL (32 mcg/mL) infusion  0.5-16 mcg/min IntraVENous TITRATE    famotidine (PF) (PEPCID) 20 mg in 0.9% sodium chloride 10 mL injection  20 mg IntraVENous DAILY    sodium chloride (NS) flush 5-40 mL  5-40 mL IntraVENous Q8H    sodium chloride (NS) flush 5-40 mL  5-40 mL IntraVENous PRN    heparin (porcine) injection 5,000 Units  5,000 Units SubCUTAneous Q12H    glucose chewable tablet 16 g  4 Tab Oral PRN    dextrose (D50W) injection syrg 12.5-25 g  25-50 mL IntraVENous PRN    glucagon (GLUCAGEN) injection 1 mg  1 mg IntraMUSCular PRN    piperacillin-tazobactam (ZOSYN) 3.375 g in 0.9% sodium chloride (MBP/ADV) 100 mL MBP  3.375 g IntraVENous Q12H       ______________________________________________________________________      Lab Review:     Recent Labs     03/01/21  0500   WBC 12.0*   HGB 10.6*   HCT 31.3*   *     Recent Labs     03/02/21  0400 03/01/21  1830 03/01/21  0500 02/28/21  0414 02/28/21  0120 02/27/21  1230 02/27/21  1230   *  --  145 144 141   < >  --    K 3.7  --  3.3* 3.4* 3.4*   < >  --    *  --  112* 115* 114*   < >  --    CO2 29  --  29 23 22   < >  --    *  --  264* 263* 291*   < >  --    BUN 69*  --  53* 48* 47*   < >  --    CREA 1.95*  --  1.67* 1.63* 1.65*   < >  --    CA 8.1*  --  8.3* 7.6* 7.6*   < >  -- MG 1.6 1.7  --   --   --   --  1.4*   PHOS 4.4  --   --   --   --   --   --    ALB 2.4*  --  2.3*  --  1.8*  --   --    ALT  --   --  95*  --  110*  --   --     < > = values in this interval not displayed. No components found for: Hossein Muñoz     03/01/21  1330 02/28/21  0415   PH 7.51* 7.46*   PCO2 32* 28*   PO2 43* 48*   HCO3 26 22   FIO2  --  21       Other pertinent lab:          Assessment & Plan:        1. Severe sepsis:  He was weaned off Levophed 2/22. Etiology appears to be multifactorial.  Probably urinary tract infection. However, he does appear to have aspiration pneumonia. Additionally, Line infection cannot be ruled out. He has right upper extremity PICC (peripherally inserted central catheter) line in place. His sepsis has resolved now. 2.  Altered mental status:   Patient's mentation has not improved. Family has decided to make him comfort care. He will be evaluated by hospice service today. He can be transferred out to the floor. 3.  Hypoxic respiratory failure:    Chest x-ray today shows worsening infiltrates particularly left lower lobe. There is a component of fluid overload as well. He has weeping edema mainly of the right upper extremity. Patient's hypoxia has shown improvement. He is on 1 L of oxygen now. He looks comfortable without any distress. Patient has Covid infection. He will remain on enhanced droplet precaution.       Thank you for allowing me to participate in the care of this patient. I will follow the patient closely with you. The patient is now DNR. He is also on dexamethasone which can be continued at this time. However decreasing the dose. Patient remains critically ill. Prognosis is guarded. Discussed with the nursing team in the ICU.     OC Coleman MD

## 2021-03-02 NOTE — NURSE NAVIGATOR
Participated in family meeting via phone with [address]. On phone were patient's wife, daughter Vita Cook, and [address] . They were unable to reach the patient's other daughter who was working. Family advised that provider told them that patient's prognosis was poor and hospice was suggested. This nurse gave brief overview of hospice and family was in agreement with bringing patient home on hospice care. Daughter stated they want to use [facility], [facility].  to send referral. Will follow as needed.

## 2021-03-02 NOTE — PROGRESS NOTES
Progress Note    Patient: Jaye Hopper MRN: [id number]  SSN: [id number]    YOB: 1934  Age: 80 y.o. Sex: male      Admit Date: 2/21/2021    LOS: 9 days     Subjective:     Patient seen, remains lethargic, does not follow commands. He is on 1L NC. Ongoing PPN. -Patient is admitted to the hospital for hypotension and altered mental status.  He is Covid positive with severe sepsis.  DNR status. - Family has decided comfort care for patient. He will be discharge home with hospice tomorrow. Objective:     Vitals:    03/02/21 1100 03/02/21 1200 03/02/21 1214 03/02/21 1300   BP:   116/77    Pulse:   99    Resp:       Temp: 98.7 °F (37.1 °C) 97.5 °F (36.4 °C)  98 °F (36.7 °C)   SpO2:       Weight:       Height:            Intake and Output:  Current Shift: 03/02 0701 - 03/02 1900  In: -   Out: 950 [Urine:950]  Last three shifts: 02/28 1901 - 03/02 0700  In: -   Out: 7357 [Urine:4350]    Physical Exam:   Skin:  Extremities and face reveal no rashes. No vanegas erythema. HEENT: Sclerae anicteric. Extra-occular muscles are intact. No abnormal pigmentation of the lips. The neck is supple. Cardiovascular: Regular rate and rhythm. Respiratory:  Comfortable breathing with no accessory muscle use. GI:  Abdomen nondistended, soft, and nontender. No enlargement of the liver or spleen. No masses palpable. Rectal: Flexiseal, dark green outpur. Musculoskeletal: Extremities have good range of motion. Neurological: Lethargic  Psychiatric:Unable to assess.    Lymphatic:  No visible adenopathy      Lab/Data Review:  Recent Results (from the past 24 hour(s))   GLUCOSE, POC    Collection Time: 03/01/21  6:12 PM   Result Value Ref Range    Glucose (POC) 258 (H) 65 - 100 mg/dL    Performed by [address] [place] [address]    Collection Time: 03/01/21  6:30 PM   Result Value Ref Range    Magnesium 1.7 1.6 - 2.4 mg/dL   GLUCOSE, POC    Collection Time: 03/01/21 11:47 PM   Result Value Ref Range    Glucose (POC) 265 (H) 65 - 100 mg/dL    Performed by [facility]    RENAL FUNCTION PANEL    Collection Time: 03/02/21  4:00 AM   Result Value Ref Range    Sodium 146 (H) 136 - 145 mmol/L    Potassium 3.7 3.5 - 5.1 mmol/L    Chloride 112 (H) 97 - 108 mmol/L    CO2 29 21 - 32 mmol/L    Anion gap 5 5 - 15 mmol/L    Glucose 290 (H) 65 - 100 mg/dL    BUN 69 (H) 6 - 20 mg/dL    Creatinine 1.95 (H) 0.70 - 1.30 mg/dL    BUN/Creatinine ratio 35 (H) 12 - 20      GFR est AA 40 (L) >60 ml/min/1.73m2    GFR est non-AA 33 (L) >60 ml/min/1.73m2    Calcium 8.1 (L) 8.5 - 10.1 mg/dL    Phosphorus 4.4 2.6 - 4.7 mg/dL    Albumin 2.4 (L) 3.5 - 5.0 g/dL   MAGNESIUM    Collection Time: 03/02/21  4:00 AM   Result Value Ref Range    Magnesium 1.6 1.6 - 2.4 mg/dL   GLUCOSE, POC    Collection Time: 03/02/21  5:13 AM   Result Value Ref Range    Glucose (POC) 351 (H) 65 - 100 mg/dL    Performed by [facility], POC    Collection Time: 03/02/21 12:07 PM   Result Value Ref Range    Glucose (POC) 327 (H) 65 - 100 mg/dL    Performed by Jenni Gray               XR CHEST PORT   Final Result   Diffuse airspace opacities in the lungs, possibly improved on the   right. XR CHEST PORT   Final Result   No significant change. XR CHEST PORT   Final Result      XR CHEST PORT   Final Result   Gastric tube unchanged from the prior exam and should be advanced 8   cm for optimal positioning. Persistent interstitial infiltrates, unchanged. Please see full report. I called the CCU with this result and recommendation at   4:31 PM. I spoke with Anderson Benz. She states the NG tube appears to be at its   distal aspect outside the patient. I suspect it is coiled within the throat as   the NG tube position appears unchanged from the earlier exam.      XR CHEST PORT   Final Result   Feeding tube tip at the GE junction. This should be advanced into   the stomach.   Airspace opacities in the lungs, left greater than right, not   significantly changed. XR CHEST PORT   Final Result      US RETROPERITONEUM COMP   Final Result   Small right renal cyst. Mild left hydronephrosis of unclear   etiology. Gonzales catheter in the bladder. Please see full report. XR CHEST PORT   Final Result   New left lung opacities, nonspecific, but could represent an infectious process   in the appropriate clinical setting. Asymmetric edema could appear similar,   though is considered less likely. Assessment:     Active Problems:    Pneumonia (2/21/2021)      Renal failure (2/21/2021)      Sepsis ([address] (2/21/2021)        Plan:   Patient's prognosis is poor  Family has decided to take patient home with hospice. Plan for discharge home tomorrow. GI will now sign off. Patient discussed with Dr Leslee Segundo and agrees to above impression and plan. Thank you for allowing me to participate in this patients care    Signed By: Hamzah Gardner.  SUZI Pillai     March 2, 2021

## 2021-03-02 NOTE — PROGRESS NOTES
Nutrition Note    Per CM note, plans to d/c on Hospice. Rec'd d/c all nutrition interventions (TPN). RD to sign off. Please reconsult as indicated.     Electronically signed by Jasvir Bautista on 3/2/2021 at 11:48 AM    Contact:

## 2021-03-02 NOTE — PROGRESS NOTES
ROYAL Diego and nurse navigator Basim Mckeon spoke with patient's daughter, Yves Crenshaw, and wife, Michael Peña on speak phone to discuss hospice. Family is agreeable, choice for [facility], [facility]. Choice letter signed and place on chart. Referral her been sent to [facility]. Plan for discharge tomorrow morning 3/3 once house is ready and DME delivered.

## 2021-03-02 NOTE — PROGRESS NOTES
Patient will discharge home on hospice tomorrow morning 3/3, [facility], [facility]. Transport has been set up with Danna Pena. Transport at [facility] has notified Dr. Urszula Howell we need the discharge order for the morning.     Discharge address:   [facility]

## 2021-03-02 NOTE — PROGRESS NOTES
Progress Note      3/1/21  13:30PM  NAME: Robinson Weinstein   MRN:  [id number]   Admit Diagnosis: Sepsis ([facility] [address] [A41.9]  Pneumonia [J18.9]  Renal failure [N19]      Problem List:   -Atrial fibrillation  -Hypertension  -Dyslipidemia  -Hypothyroidism  -Sepsis  -History of TIA  -Spinal stenosis    Patient is on comfort care so we will sign off     Assessment/Plan:   Note dictated March 2, 2021  -Patient is lying comfortably in the bed and did not respond to vocal command.  -Monitor shows controlled atrial fibrillation with no other rhythm abnormalities  -Nursing staff informed me that patient is on comfort care only  -Continue current conservative medical management for acute on chronic congestive heart failure per cardiology.  -Continue to diurese him for comfort measures and plan comfort care as an finalized by the family.  -No further cardiovascular testing at this time and we will sign off.  -Please do not hesitate to contact me if you have any question regarding Mr. Sally Crook  ============================================================  3/1/21  - patient observed lying in bed with eyes closed. Facial grimacing noted with painful stimuli.   - no acute distress noted  - Still in atrial fibrillation on telemetry without acute cardiovascular events overnight  - vital signs are hemodynamically stable  - echo performed on 2/5/21 reveals an EF of 79%  - based upon by cardiovascular assessment will continue to treat conservatively and monitor prn during hospitalization.  ==========================================================  Note dictated February 26, 2021.  -Patient is lying comfortably in the bed and is still in atrial fibrillation with controlled heart rate.  -Respond to vocal command or minimum painful stimuli.  -No acute cardiac issue at this point for me to be aggressive in his cardiac care so we will continue current conservative medical management and follow him on as needed basis from now onward while he is in the hospital.  ------------------------------------------------------------------------------------------------------------  2/25/21  - patient resting in bed with eyes opened. Disoriented x 3. No acute distress noted. - atrial fibrillation on telemetry 80-90 bpm. controlled  - no acute cardiovascular events overnight  - troponin continues to trend downward  - continue cardizem IV with goal heart rate less than 100bpm  - will continue conservative cardiovascular management at this time and monitor patient during hospitalization.  ==========================================================  2/24/21  - patient observed lying in bed with eyes closed. Arouses when name is called. - - non-verbal, no acute distress noted. - atrial fibrillation on telemetry 110-120bpm.  - no acute events overnight.   - troponin trending downward  - will start on digoxin with caution  with the goal to control heart rate less than 100bpm to minimize the risk of tachycardia induced cardiomyopathy.  - Considering renal insufficiency I will give him only one dose and replace K+  aggressively  - if the medical management fails we will consider SHELIA cardioversion.  ==========================================================  -Follow-up visit from cardiology secondary to borderline increased troponin  -Patient is lying comfortably in the bed but did not respond or engage with verbal communication.  -Monitor shows atrial fibrillation with controlled heart rate  -Normal ejection fraction of 79%.  -No acute cardiac issue based on my overall cardiac assessment.  -This was a limited examination this morning as patient has tenderness which could be consistent with COVID-19 infection.  -No further cardiovascular testing warranted at this time based on my overall cardiac assessment and we will continue to watch him while he is in the hospital along with the team with conservative approach         []       High complexity decision making was performed in this patient at high risk for decompensation with multiple organ involvement. Subjective:     Ramon Co denies chest pain, dyspnea. Discussed with RN events overnight. Review of Systems: Patient is non-verbal. No acute distress noted. Objective:      Physical Exam:    Last 24hrs VS reviewed since prior progress note. Most recent are:    Visit Vitals  /77 (BP 1 Location: Left lower arm, BP Patient Position: At rest)   Pulse 87   Temp 98.9 °F (37.2 °C)   Resp 24   Ht 6' (1.829 m)   Wt 73.6 kg (162 lb 4.1 oz)   SpO2 97%   BMI 22.01 kg/m²       Intake/Output Summary (Last 24 hours) at 3/2/2021 0831  Last data filed at 3/2/2021 0793  Gross per 24 hour   Intake    Output 2550 ml   Net -2550 ml      General Appearance: ill-appearing, no acute distress  Ears/Nose/Mouth/Throat: Facial grimacing with painful stimuli  Neck: Supple. JVP within normal limits. Carotids good upstrokes, with no bruit. Chest: Lungs diminished to auscultation bilaterally; rhonci throughout. Cardiovascular: irregular rate and rhythm, no murmur, rubs, gallops. Abdomen: Soft, non-tender, bowel sounds are hypoactive. No organomegaly. Extremities:trace edema bilaterally. Femoral pulses +2, Distal Pulses +1.  +2 edema and erythema noted to right upper extremity. Skin: Warm and dry. Neuro: facial grimacing noted with painful stimuli      PMH/SH reviewed - no change compared to H&P    Data Review    Telemetry: atrial fibrillation    Result status: Final result   · LV: Calculated LVEF is 79%. Normal cavity size, wall thickness, systolic function (ejection fraction normal) and diastolic function. Wall motion: normal. Normal left ventricular strain. · LA: Moderately dilated left atrium. · RV: Mildly dilated right ventricle. Mildly reduced systolic function. · RA: Mildly dilated right atrium. · AV: Aortic valve leaflet calcification present. Mild aortic valve regurgitation is present.   · MV: Mild mitral valve regurgitation is present. · TV: Mild tricuspid valve regurgitation is present. RVSP 41.  · Pericardium: Trivial pericardial effusion. Echo Findings    Left Ventricle Normal cavity size, wall thickness, systolic function (ejection fraction normal) and diastolic function. The muscle mass is normal. The cavity shape is normal. Wall motion: normal. The calculated EF is 79%. End-systolic volume is normal. Normal left ventricular strain. Normal left ventricular diastolic pressure. End-diastolic volume is normal.   Left Atrium Moderately dilated left atrium. Right Ventricle Mildly dilated right ventricle. Mildly reduced systolic function. Right Atrium Mildly dilated right atrium. Interatrial Septum Interatrial septum not well visualized   Aortic Valve No stenosis. There is leaflet calcification. Mild aortic valve regurgitation. Mitral Valve Normal valve structure and no stenosis. Mild regurgitation. Tricuspid Valve Normal valve structure and no stenosis. Mild regurgitation. Pulmonic Valve Pulmonic valve not well visualized. Aorta The aorta was not well visualized. Pulmonary Artery Pulmonary arteries not well visualized. IVC/Hepatic Veins Normal structure. Normal central venous pressure (3 mmHg); IVC diameter is less than 21 mm and collapses more than 50% with respiration. Pericardium Trivial pericardial effusion noted. Carotid Duplex 2/5/21:    Right Carotid    There is mild stenosis in the right ICA (<50%). The right vertebral is antegrade. Left Carotid    There is mild stenosis in the left ICA (<50%). The left vertebral is antegrade. Results for Melvi Muhammad (MRN [id number]) as of 3/1/2021 14:04   Ref.  Range 3/1/2021 05:00   WBC Latest Ref Range: 4.1 - 11.1 K/uL 12.0 (H)   NRBC Latest Ref Range: 0  WBC 1.8 (H)   RBC Latest Ref Range: 4.10 - 5.70 M/uL 2.59 (L)   HGB Latest Ref Range: 12.1 - 17.0 g/dL 10.6 (L)   HCT Latest Ref Range: 36.6 - 50.3 % 31.3 (L)   MCV Latest Ref Range: 80.0 - 99.0 .8 (H)   MCH Latest Ref Range: 26.0 - 34.0 PG 40.9 (H)   MCHC Latest Ref Range: 30.0 - 36.5 g/dL 33.9   RDW Latest Ref Range: 11.5 - 14.5 % 14.4   PLATELET Latest Ref Range: 150 - 400 K/uL 146 (L)   MPV Latest Ref Range: 8.9 - 12.9 FL 13.6 (H)   NEUTROPHILS Latest Ref Range: 32 - 75 % 93 (H)   LYMPHOCYTES Latest Ref Range: 12 - 49 % 3 (L)   MONOCYTES Latest Ref Range: 5 - 13 % 3 (L)   EOSINOPHILS Latest Ref Range: 0 - 7 % 0   BASOPHILS Latest Ref Range: 0 - 1 % 0   IMMATURE GRANULOCYTES Latest Ref Range: 0.0 - 0.5 % 1 (H)   DF Latest Units:   AUTOMATED   ABSOLUTE NRBC Latest Ref Range: 0.00 - 0.01 K/uL 0.21 (H)   ABS. NEUTROPHILS Latest Ref Range: 1.8 - 8.0 K/UL 11.3 (H)   ABS. IMM. GRANS. Latest Ref Range: 0.00 - 0.04 K/UL 0.1 (H)   ABS. LYMPHOCYTES Latest Ref Range: 0.8 - 3.5 K/UL 0.4 (L)   ABS. MONOCYTES Latest Ref Range: 0.0 - 1.0 K/UL 0.3   ABS. EOSINOPHILS Latest Ref Range: 0.0 - 0.4 K/UL 0.0   ABS. BASOPHILS Latest Ref Range: 0.0 - 0.1 K/UL 0.0   Results for Alesia Rodríguez (MRN [id number]) as of 3/1/2021 14:04   Ref.  Range 3/1/2021 05:00   Sodium Latest Ref Range: 136 - 145 mmol/L 145   Potassium Latest Ref Range: 3.5 - 5.1 mmol/L 3.3 (L)   Chloride Latest Ref Range: 97 - 108 mmol/L 112 (H)   CO2 Latest Ref Range: 21 - 32 mmol/L 29   Anion gap Latest Ref Range: 5 - 15 mmol/L 4 (L)   Glucose Latest Ref Range: 65 - 100 mg/dL 264 (H)   BUN Latest Ref Range: 6 - 20 mg/dL 53 (H)   Creatinine Latest Ref Range: 0.70 - 1.30 mg/dL 1.67 (H)   BUN/Creatinine ratio Latest Ref Range: 12 - 20   32 (H)   Calcium Latest Ref Range: 8.5 - 10.1 mg/dL 8.3 (L)   GFR est non-AA Latest Ref Range: >60 ml/min/1.73m2 39 (L)   GFR est AA Latest Ref Range: >60 ml/min/1.73m2 48 (L)   Bilirubin, total Latest Ref Range: 0.2 - 1.0 mg/dL 0.7   Protein, total Latest Ref Range: 6.4 - 8.2 g/dL 6.0 (L)   Albumin Latest Ref Range: 3.5 - 5.0 g/dL 2.3 (L)   Globulin Latest Ref Range: 2.0 - 4.0 g/dL 3.7   A-G Ratio Latest Ref Range: 1.1 - 2.2   0.6 (L)   ALT Latest Ref Range: 12 - 78 U/L 95 (H)   AST Latest Ref Range: 15 - 37 U/L 35   Alk.  phosphatase Latest Ref Range: 45 - 117 U/L 123 (H)   Current IP Meds  Comment  Hide  (From admission, onward)     Last edited by  on  at    Start   Stop Status Route Frequency Ordered   03/01/21 2200  amino acid 4.25 % dextrose 5 % with electrolytes (CLINIMIX E) infusion    Discontinue    03/02 2159 Dispensed IV CONTINUOUS 03/01/21 1158   03/01/21 1200  potassium chloride 10 mEq in 100 ml IVPB    Discontinue    03/01 1457 Dispensed IV EVERY 1 HOUR 03/01/21 1155   02/28/21 2100  furosemide (LASIX) injection 40 mg    Discontinue    -- Dispensed IV 2 TIMES DAILY 02/28/21 1709 02/28/21 2000  vancomycin (VANCOCIN) 1,000 mg in 0.9% sodium chloride 250 mL (VIAL-MATE)      02/28 2252 Completed IV ONCE 02/28/21 1817   02/28/21 1800  midodrine (PROAMATINE) tablet 5 mg    Discontinue    -- Verified PO 4 TIMES DAILY 02/28/21 1712   02/28/21 1800  magnesium sulfate 1 g/100 ml IVPB (premix or compounded)      02/28 1852 Completed IV ONCE 02/28/21 1718   02/28/21 1800  furosemide (LASIX) injection 40 mg      02/28 1749 Completed IV ONCE 02/28/21 1720   02/28/21 1648  bisacodyL (DULCOLAX) suppository 10 mg    Discontinue    -- Verified RE DAILY PRN 02/28/21 1648   02/28/21 1500  albumin human 25% (BUMINATE) solution 25 g   Note to Pharmacy: X 2 runs    02/28 1611 Completed IV ONCE 02/28/21 1424   02/28/21 1200  metoprolol (LOPRESSOR) injection 5 mg    Discontinue    -- Dispensed IV EVERY 6 HOURS 02/28/21 0831   02/27/21 0926  hydrOXYzine (VISTARIL) injection 25 mg    Discontinue    -- Dispensed IM EVERY 6 HOURS AS NEEDED 02/27/21 0927   02/24/21 2100  dexamethasone (DECADRON) 4 mg/mL injection 4 mg    Discontinue    -- Dispensed IV EVERY 12 HOURS 02/24/21 1219   02/24/21 1200  insulin lispro (HUMALOG) injection (CORRECTIONAL SCALE LISPRO PANEL)    Discontinue    -- Dispensed SC EVERY 6 HOURS 02/24/21 1018   02/23/21 1737  dextrose (D50W) injection syrg 12.5 g    Discontinue    -- Verified IV AS NEEDED 02/23/21 1741   02/22/21 1400  famotidine (PF) (PEPCID) 20 mg in 0.9% sodium chloride 10 mL injection    Discontinue    -- Dispensed IV DAILY 02/22/21 1307   02/22/21 0900  NOREPINephrine (LEVOPHED) 8 mg in 5% dextrose 250mL (32 mcg/mL) infusion    Discontinue    -- Verified IV TITRATE 02/22/21 0806   02/21/21 2100  piperacillin-tazobactam (ZOSYN) 3.375 g in 0.9% sodium chloride (MBP/ADV) 100 mL MBP    Discontinue    03/07 2259 Dispensed IV EVERY 12 HOURS 02/21/21 1516   02/21/21 1510  glucose chewable tablet 16 g (HYPOGLYCEMIC PROTOCOL)    Discontinue    -- Dispensed PO AS NEEDED 02/21/21 1512   02/21/21 1510  dextrose (D50W) injection syrg 12.5-25 g (HYPOGLYCEMIC PROTOCOL)    Discontinue    -- Dispensed IV AS NEEDED 02/21/21 1512   02/21/21 1510  glucagon (GLUCAGEN) injection 1 mg (HYPOGLYCEMIC PROTOCOL)    Discontinue    -- Verified IM AS NEEDED 02/21/21 1512   02/21/21 1500  sodium chloride (NS) flush 5-40 mL (SALINE LOCK FLUSH PANEL)    Discontinue    -- Dispensed IV EVERY 8 HOURS 02/21/21 1457   02/21/21 1500  heparin (porcine) injection 5,000 Units    Discontinue    -- Dispensed SC EVERY 12 HOURS 02/21/21 1457   02/21/21 1450  sodium chloride (NS) flush 5-40 mL (SALINE LOCK FLUSH PANEL)    Discontinue    -- Dispensed IV AS NEEDED 02/21/21 1457     Tamiko Ramirez NP

## 2021-03-02 NOTE — DISCHARGE SUMMARY
Discharge Summary     Patient: Shanelle Pratt MRN: [id number]  SSN: [id number]    YOB: 1934  Age: 80 y.o. Sex: male       Admit Date: 2/21/2021    Discharge Date: 3/2/2021      Admission Diagnoses: Sepsis ([facility] [A41.9]  Pneumonia [J18.9]  Renal failure [N19]    Discharge Diagnoses:   Problem List as of 3/2/2021 Never Reviewed          Codes Class Noted - Resolved    Pneumonia ICD-10-CM: J18.9  ICD-9-CM: 186  2/21/2021 - Present        Renal failure ICD-10-CM: N19  ICD-9-CM: 416  2/21/2021 - Present        Sepsis ([facility] ICD-10-CM: A41.9  ICD-9-CM: 038.9, 995.91  2/21/2021 - Present        Weakness ICD-10-CM: R53.1  ICD-9-CM: 780.79  2/8/2021 - Present        Fall ICD-10-CM: W19. Miri Ramachandran  ICD-9-CM: E888.9  2/3/2021 - Present               Discharge Condition: Poor    Hospital Course: 80years old history of atrial fibrillation, diabetes, hypertension hypothyroidism spinal stenosis was brought to the hospital for altered mental status has a history of dementia patient was recently positive for COVID-19 she was hypotensive and in septic shock emergency room IV fluids were started and also had metabolic encephalopathy. He was admitted for pneumonitis possibly secondary to aspiration and COVID-19 COVID-19 was positive consultation was placed to infectious disease patient was admitted to the ICU started on IV antibiotics patient had UTI secondary to E faecalis and Klebsiella pneumonia was placed on Zosyn. Consults: Cardiology, Infectious Disease and Pulmonary/Critical Care    Significant Diagnostic Studies: labs:     XR CHEST PORT   Final Result   Diffuse airspace opacities in the lungs, possibly improved on the   right. XR CHEST PORT   Final Result   No significant change. XR CHEST PORT   Final Result      XR CHEST PORT   Final Result   Gastric tube unchanged from the prior exam and should be advanced 8   cm for optimal positioning. Persistent interstitial infiltrates, unchanged.    Please see full report. I called the CCU with this result and recommendation at   4:31 PM. I spoke with Helga Llamas. She states the NG tube appears to be at its   distal aspect outside the patient. I suspect it is coiled within the throat as   the NG tube position appears unchanged from the earlier exam.      XR CHEST PORT   Final Result   Feeding tube tip at the GE junction. This should be advanced into   the stomach. Airspace opacities in the lungs, left greater than right, not   significantly changed. XR CHEST PORT   Final Result      US RETROPERITONEUM COMP   Final Result   Small right renal cyst. Mild left hydronephrosis of unclear   etiology. Gonzales catheter in the bladder. Please see full report. XR CHEST PORT   Final Result   New left lung opacities, nonspecific, but could represent an infectious process   in the appropriate clinical setting. Asymmetric edema could appear similar,   though is considered less likely. Disposition: hospice    Discharge Medications:   Current Discharge Medication List      START taking these medications    Details   bisacodyL (DULCOLAX) 10 mg supp Insert 10 mg into rectum daily.   Qty: 30 Suppository, Refills: 0         STOP taking these medications       metoprolol succinate (TOPROL-XL) 50 mg XL tablet Comments:   Reason for Stopping:         atorvastatin (LIPITOR) 20 mg tablet Comments:   Reason for Stopping:         aspirin delayed-release 81 mg tablet Comments:   Reason for Stopping:         lisinopriL (PRINIVIL, ZESTRIL) 40 mg tablet Comments:   Reason for Stopping:         finasteride (PROSCAR) 5 mg tablet Comments:   Reason for Stopping:         alogliptin (Nesina) 25 mg tablet Comments:   Reason for Stopping:         digoxin (LANOXIN) 0.125 mg tablet Comments:   Reason for Stopping:         levothyroxine (SYNTHROID) 50 mcg tablet Comments:   Reason for Stopping:         glyBURIDE (DIABETA) 5 mg tablet Comments:   Reason for Stopping:         metFORMIN (GLUCOPHAGE) 500 mg tablet Comments:   Reason for Stopping:         SITagliptin (Januvia) 100 mg tablet Comments:   Reason for Stopping:         dilTIAZem ER (DILACOR XR) 240 mg capsule Comments:   Reason for Stopping:         dabigatran etexilate (Pradaxa) 75 mg capsule Comments:   Reason for Stopping:         tamsulosin (FLOMAX) 0.4 mg capsule Comments:   Reason for Stopping:         tolterodine ER (DETROL LA) 4 mg ER capsule Comments:   Reason for Stopping:         hydroxyurea (HYDREA) 500 mg capsule Comments:   Reason for Stopping:               Activity: Bedrest  Diet: Comfort feeding  Wound Care: As directed    Follow-up Appointments   Procedures    FOLLOW UP VISIT Appointment in: 3 - 5 Days     Standing Status:   Standing     Number of Occurrences:   1     Order Specific Question:   Appointment in     Answer:   3 - 5 Days   50 minutes discharge time    Signed By: Cary Boone MD     March 2, 2021

## 2021-03-02 NOTE — PROGRESS NOTES
Renal Daily Progress Note    Admit Date: 2/21/2021      Subjective: Intake 700 output documented at 2550 mL. He is on peripheral TPN.       Current Facility-Administered Medications   Medication Dose Route Frequency    [START ON 3/3/2021] furosemide (LASIX) injection 40 mg  40 mg IntraVENous DAILY    TPN ADULT-PERIPHERAL AA 4.25% D5% + ELECTROLYTES   IntraVENous CONTINUOUS    amino acid 4.25 % dextrose 5 % with electrolytes (CLINIMIX E) infusion   IntraVENous CONTINUOUS    metoprolol (LOPRESSOR) injection 5 mg  5 mg IntraVENous Q6H    bisacodyL (DULCOLAX) suppository 10 mg  10 mg Rectal DAILY PRN    midodrine (PROAMATINE) tablet 5 mg  5 mg Oral QID    hydrOXYzine (VISTARIL) injection 25 mg  25 mg IntraMUSCular Q6H PRN    insulin lispro (HUMALOG) injection   SubCUTAneous Q6H    dexamethasone (DECADRON) 4 mg/mL injection 4 mg  4 mg IntraVENous Q12H    dextrose (D50W) injection syrg 12.5 g  25 mL IntraVENous PRN    NOREPINephrine (LEVOPHED) 8 mg in 5% dextrose 250mL (32 mcg/mL) infusion  0.5-16 mcg/min IntraVENous TITRATE    famotidine (PF) (PEPCID) 20 mg in 0.9% sodium chloride 10 mL injection  20 mg IntraVENous DAILY    sodium chloride (NS) flush 5-40 mL  5-40 mL IntraVENous Q8H    sodium chloride (NS) flush 5-40 mL  5-40 mL IntraVENous PRN    heparin (porcine) injection 5,000 Units  5,000 Units SubCUTAneous Q12H    glucose chewable tablet 16 g  4 Tab Oral PRN    dextrose (D50W) injection syrg 12.5-25 g  25-50 mL IntraVENous PRN    glucagon (GLUCAGEN) injection 1 mg  1 mg IntraMUSCular PRN    piperacillin-tazobactam (ZOSYN) 3.375 g in 0.9% sodium chloride (MBP/ADV) 100 mL MBP  3.375 g IntraVENous Q12H        Review of Systems        Objective:     Patient Vitals for the past 8 hrs:   BP Temp Pulse Resp SpO2 Weight   03/02/21 1214 116/77  99      03/02/21 1200  97.5 °F (36.4 °C)       03/02/21 1100  98.7 °F (37.1 °C)       03/02/21 1000  98 °F (36.7 °C)       03/02/21 0900  97.7 °F (36.5 °C)       03/02/21 0848 (!) 111/94  88      03/02/21 0830     97 %    03/02/21 0800  98.4 °F (36.9 °C)       03/02/21 0700  98.9 °F (37.2 °C)       03/02/21 0605 114/77  87 24 98 %    03/02/21 0551      73.6 kg (162 lb 4.1 oz)   03/02/21 0500 115/65  91 (!) 36 98 %      03/02 0701 - 03/02 1900  In: -   Out: 250 [Urine:250]  02/28 1901 - 03/02 0700  In: -   Out: 3739 [Urine:4350]    Physical Exam:   As per pulmonary            XR CHEST PORT   Final Result   Diffuse airspace opacities in the lungs, possibly improved on the   right. XR CHEST PORT   Final Result   No significant change. XR CHEST PORT   Final Result      XR CHEST PORT   Final Result   Gastric tube unchanged from the prior exam and should be advanced 8   cm for optimal positioning. Persistent interstitial infiltrates, unchanged. Please see full report. I called the CCU with this result and recommendation at   4:31 PM. I spoke with Kortney Padilla. She states the NG tube appears to be at its   distal aspect outside the patient. I suspect it is coiled within the throat as   the NG tube position appears unchanged from the earlier exam.      XR CHEST PORT   Final Result   Feeding tube tip at the GE junction. This should be advanced into   the stomach. Airspace opacities in the lungs, left greater than right, not   significantly changed. XR CHEST PORT   Final Result      US RETROPERITONEUM COMP   Final Result   Small right renal cyst. Mild left hydronephrosis of unclear   etiology. Gonzales catheter in the bladder. Please see full report. XR CHEST PORT   Final Result   New left lung opacities, nonspecific, but could represent an infectious process   in the appropriate clinical setting. Asymmetric edema could appear similar,   though is considered less likely.               Data Review   Recent Labs     03/01/21  0500   WBC 12.0*   HGB 10.6*   HCT 31.3*   *     Recent Labs     03/02/21  0400 03/01/21  1830 03/01/21  0500 02/28/21  0414 02/28/21  0120   *  --  145 144 141   K 3.7  --  3.3* 3.4* 3.4*   *  --  112* 115* 114*   CO2 29  --  29 23 22   *  --  264* 263* 291*   BUN 69*  --  53* 48* 47*   CREA 1.95*  --  1.67* 1.63* 1.65*   CA 8.1*  --  8.3* 7.6* 7.6*   MG 1.6 1.7  --   --   --    PHOS 4.4  --   --   --   --    ALB 2.4*  --  2.3*  --  1.8*   ALT  --   --  95*  --  110*     No components found for: [place]  Recent Labs     03/01/21  1330 02/28/21  0415   PH 7.51* 7.46*   PCO2 32* 28*   PO2 43* 48*   HCO3 26 22   FIO2  --  21     No results for input(s): INR, INREXT, INREXT, INREXT in the last 72 hours. Assessment:           Active Problems:    Pneumonia (2/21/2021)      Renal failure (2/21/2021)      Sepsis ([address] (2/21/2021)    Nonoliguric NATALI. Hypokalemia repleted  Total body water overload  COVID-19 pneumonia  Atrial fibrillation off diltiazem  Hypertension  Diabetes  Plan:   Continue peripheral TPN  Modest increase in the creatinine noted. Will decrease Lasix to 40 mg daily  Increase insulin and TPN to 20 units for tighter control of diabetes.

## 2021-03-02 NOTE — PROGRESS NOTES
Patient Education        Skin Abscess: Care Instructions  Your Care Instructions    A skin abscess is a bacterial infection that forms a pocket of pus. A boil is a kind of skin abscess. The doctor may have cut an opening in the abscess so that the pus can drain out. You may have gauze in the cut so that the abscess will stay open and keep draining. You may need antibiotics. You will need to follow up with your doctor to make sure the infection has gone away. The doctor has checked you carefully, but problems can develop later. If you notice any problems or new symptoms, get medical treatment right away. Follow-up care is a key part of your treatment and safety. Be sure to make and go to all appointments, and call your doctor if you are having problems. It's also a good idea to know your test results and keep a list of the medicines you take. How can you care for yourself at home? · Apply warm and dry compresses, a heating pad set on low, or a hot water bottle 3 or 4 times a day for pain. Keep a cloth between the heat source and your skin. · If your doctor prescribed antibiotics, take them as directed. Do not stop taking them just because you feel better. You need to take the full course of antibiotics. · Take pain medicines exactly as directed. ? If the doctor gave you a prescription medicine for pain, take it as prescribed. ? If you are not taking a prescription pain medicine, ask your doctor if you can take an over-the-counter medicine. · Keep your bandage clean and dry. Change the bandage whenever it gets wet or dirty, or at least one time a day. · If the abscess was packed with gauze:  ? Keep follow-up appointments to have the gauze changed or removed. If the doctor instructed you to remove the gauze, follow the instructions you were given for how to remove it. ? After the gauze is removed, soak the area in warm water for 15 to 20 minutes 2 times a day, until the wound closes.   When should you call Shift report given to ONEIDA Mccormick. for help? Call your doctor now or seek immediate medical care if:    · You have signs of worsening infection, such as:  ? Increased pain, swelling, warmth, or redness. ? Red streaks leading from the infected skin. ? Pus draining from the wound. ? A fever.    Watch closely for changes in your health, and be sure to contact your doctor if:    · You do not get better as expected. Where can you learn more? Go to http://karis-shannon.info/. Enter P824 in the search box to learn more about \"Skin Abscess: Care Instructions. \"  Current as of: April 1, 2019  Content Version: 12.2  © 1807-3950 [employer]. Care instructions adapted under license by [employer] (which disclaims liability or warranty for this information). If you have questions about a medical condition or this instruction, always ask your healthcare professional. [address] any warranty or liability for your use of this information. Your A1C  was   Lab Results   Component Value Date/Time    Hemoglobin A1c 12.5 (H) 09/30/2019 03:52 PM    Hemoglobin A1c (POC) 13.3 01/25/2019 03:26 PM   .    This lab test reflects that your blood sugar averaged 312 mg/dL  over the past 3 months. It is important to follow up with your provider on a routine basis to continue to evaluate your blood sugar discuss any necessary changes in treatment.

## 2021-03-02 NOTE — PROGRESS NOTES
Pt is now on [facility] so Dr. Andreas Dai is not renewing the PPN. The order written to start tonight has been discontinued.       Candis Gooden [facility]

## 2021-03-02 NOTE — PROGRESS NOTES
Infectious Disease Progress Note           Subjective:   No significant improvement in clinical status. Plans to d/c home on 3/03 on hospice. Remains off pressor support  Objective:   Physical Exam:     Visit Vitals  BP (!) 142/83 (BP 1 Location: Left lower arm, BP Patient Position: At rest)   Pulse 92   Temp 98 °F (36.7 °C)   Resp (!) 34   Ht 6' (1.829 m)   Wt 162 lb 4.1 oz (73.6 kg)   SpO2 95%   BMI 22.01 kg/m²    O2 Flow Rate (L/min): 1 l/min O2 Device: Nasal cannula    Temp (24hrs), Av.2 °F (36.8 °C), Min:97.5 °F (36.4 °C), Max:99 °F (37.2 °C)    701 -  190  In: 200 [I.V.:200]  Out: 950 [Urine:950]   1901 -  0700  In: -   Out: 7033 [Urine:4350]    General: NAD, generalized weakness, not following commands  HEENT: JUAN CARLOS, Moist mucosa   Lungs: Decreased at the bases, no wheeze/rhonchi  Heart: S1S2+, RRR, no murmur  Abdo: Soft, NT, ND, +BS   : + indwelling benjamin cath   Exts: Trace edema, + pulses b/l   Skin: No wounds, No rashes or lesions    Data Review:       Recent Days:  Recent Labs     21  0500   WBC 12.0*   HGB 10.6*   HCT 31.3*   *     Recent Labs     21  0400 21  0500 21  0414   BUN 69* 53* 48*   CREA 1.95* 1.67* 1.63*       Lab Results   Component Value Date/Time    C-Reactive protein 4.33 (H) 2021 03:30 AM        Microbiology   - Urine Cx : K. Pneumoniae and E.faecalis  - Blood Cx : Coag neg staff     Diagnostics   CXR Results  (Last 48 hours)               21 0334  XR CHEST PORT Final result    Impression:  Diffuse airspace opacities in the lungs, possibly improved on the   right. Narrative:  Chest, frontal view, 3/1/2021       History: Evaluate for improvement. Comparison: Including chest 2021. Findings: Right-sided PICC line tip is at the distal SVC. The cardiac   silhouette is stable. The lungs are adequately expanded.   Diffuse airspace   opacities in the lungs are again noted. Airspace opacities in the right lung   are either less prominent due to more neutral positioning as compared to the   study performed one day prior or improved. No pneumothorax is identified. The   osseous structures are stable. Assessment/Plan     1. Coag neg staph bacteremia in the setting of right arm PICC line      Isolated from 1/4 BCx btles collected in the ED, repeat on 02/23 is neg       Afebrile w a moderate leukocytosis on routine labs      S/p 7 days of Vanc, discontinued on 03/01        2. UTI, abnormal UA, K. Pneumoniae and E.faecalis isolated from Cx.      + benjamin cath. On day # 10 of Zosyn, d/c upon discharge home     3. Acute renal failure: Cr trending up on todays labs     4. COVID-19 w/o sig hypoxia, continue on decadron       Suspected aspiration PNA, on Zosyn as above         5.  Dysphagia: On PPN    Dispo: [facility] understands poor prognosis and are agreeable w hospice, pt to be discharged home on hospice 03/03      Aj Velázquez MD    3/2/2021

## 2021-03-03 VITALS
WEIGHT: 162.26 LBS | BODY MASS INDEX: 21.98 KG/M2 | HEART RATE: 105 BPM | TEMPERATURE: 97.8 F | DIASTOLIC BLOOD PRESSURE: 91 MMHG | SYSTOLIC BLOOD PRESSURE: 121 MMHG | RESPIRATION RATE: 37 BRPM | HEIGHT: 72 IN | OXYGEN SATURATION: 96 %

## 2021-03-03 LAB
BACTERIA SPEC CULT: NORMAL
GLUCOSE BLD STRIP.AUTO-MCNC: 298 MG/DL (ref 65–100)
PERFORMED BY, TECHID: ABNORMAL
SPECIAL REQUESTS,SREQ: NORMAL

## 2021-03-03 PROCEDURE — 74011250636 HC RX REV CODE- 250/636: Performed by: INTERNAL MEDICINE

## 2021-03-03 PROCEDURE — 74011000250 HC RX REV CODE- 250: Performed by: INTERNAL MEDICINE

## 2021-03-03 PROCEDURE — 77010033678 HC OXYGEN DAILY

## 2021-03-03 PROCEDURE — 82962 GLUCOSE BLOOD TEST: CPT

## 2021-03-03 PROCEDURE — 74011636637 HC RX REV CODE- 636/637: Performed by: INTERNAL MEDICINE

## 2021-03-03 RX ADMIN — INSULIN LISPRO 4 UNITS: 100 INJECTION, SOLUTION INTRAVENOUS; SUBCUTANEOUS at 05:56

## 2021-03-03 RX ADMIN — HEPARIN SODIUM 5000 UNITS: 5000 INJECTION INTRAVENOUS; SUBCUTANEOUS at 03:00

## 2021-03-03 RX ADMIN — METOPROLOL TARTRATE 5 MG: 5 INJECTION INTRAVENOUS at 06:09

## 2021-03-03 RX ADMIN — Medication 10 ML: at 06:10

## 2021-03-03 NOTE — PROGRESS NOTES
Medical transportation to bedside. Patient's daughter Christiano Granda [phone] notified of ETA on when to expect patient arrival. Case management Laisha notified nurse that hospital bed and oxygen have been delivered to home and patient was appropriate for discharge at this time. Patient discharged on stretcher by medical transportation.

## 2021-03-03 NOTE — PROGRESS NOTES
Patient remains obtunded, no response to verbal commands. Distress. Patient continues to be on supplemental oxygen. He will be evaluated by hospice today. No further patients. We will sign off.

## 2021-03-03 NOTE — PROGRESS NOTES
Patient has been accepted with [facility], [facility] with start of care today. CM spoke with [facility] as well as the patient's daughter, Shawna Baker [phone], and confirmed all the equipment, including hospital bed and oxygen, has been delivered to their home. They are prepared for patient to arrive home this morning and Pat Pacheco will provide transportation for the patient home at 10am.     The above information was discussed with patient's primary nurse, Naomie Ruiz.

## 2022-03-19 PROBLEM — J18.9 PNEUMONIA: Status: ACTIVE | Noted: 2021-02-21

## 2022-03-19 PROBLEM — R53.1 WEAKNESS: Status: ACTIVE | Noted: 2021-02-08
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
## 2022-03-19 PROBLEM — N19 RENAL FAILURE: Status: ACTIVE | Noted: 2021-02-21

## 2022-03-19 PROBLEM — A41.9 SEPSIS (HCC): Status: ACTIVE | Noted: 2021-02-21

## 2023-05-15 RX ORDER — BISACODYL 10 MG
10 SUPPOSITORY, RECTAL RECTAL DAILY
COMMUNITY
Start: 2021-03-02